# Patient Record
Sex: MALE | Race: WHITE | NOT HISPANIC OR LATINO | Employment: OTHER | URBAN - METROPOLITAN AREA
[De-identification: names, ages, dates, MRNs, and addresses within clinical notes are randomized per-mention and may not be internally consistent; named-entity substitution may affect disease eponyms.]

---

## 2017-04-05 RX ORDER — ETODOLAC 500 MG/1
500 TABLET, FILM COATED ORAL 2 TIMES DAILY
Qty: 60 TABLET | Refills: 2 | Status: SHIPPED | OUTPATIENT
Start: 2017-04-05 | End: 2019-08-07 | Stop reason: SDUPTHER

## 2017-04-13 ENCOUNTER — LAB VISIT (OUTPATIENT)
Dept: LAB | Facility: HOSPITAL | Age: 62
End: 2017-04-13
Attending: INTERNAL MEDICINE
Payer: MEDICARE

## 2017-04-13 DIAGNOSIS — M05.79 SEROPOSITIVE RHEUMATOID ARTHRITIS OF MULTIPLE SITES: ICD-10-CM

## 2017-04-13 LAB
ALBUMIN SERPL BCP-MCNC: 3.7 G/DL
ALP SERPL-CCNC: 89 U/L
ALT SERPL W/O P-5'-P-CCNC: 32 U/L
ANION GAP SERPL CALC-SCNC: 10 MMOL/L
AST SERPL-CCNC: 26 U/L
BASOPHILS # BLD AUTO: 0.02 K/UL
BASOPHILS NFR BLD: 0.3 %
BILIRUB SERPL-MCNC: 0.3 MG/DL
BUN SERPL-MCNC: 19 MG/DL
CALCIUM SERPL-MCNC: 9.2 MG/DL
CHLORIDE SERPL-SCNC: 106 MMOL/L
CO2 SERPL-SCNC: 24 MMOL/L
CREAT SERPL-MCNC: 1 MG/DL
CRP SERPL-MCNC: 3 MG/L
DIFFERENTIAL METHOD: NORMAL
EOSINOPHIL # BLD AUTO: 0.2 K/UL
EOSINOPHIL NFR BLD: 3.2 %
ERYTHROCYTE [DISTWIDTH] IN BLOOD BY AUTOMATED COUNT: 13 %
ERYTHROCYTE [SEDIMENTATION RATE] IN BLOOD BY WESTERGREN METHOD: 27 MM/HR
EST. GFR  (AFRICAN AMERICAN): >60 ML/MIN/1.73 M^2
EST. GFR  (NON AFRICAN AMERICAN): >60 ML/MIN/1.73 M^2
GLUCOSE SERPL-MCNC: 106 MG/DL
HCT VFR BLD AUTO: 42.9 %
HGB BLD-MCNC: 14.3 G/DL
LYMPHOCYTES # BLD AUTO: 1.3 K/UL
LYMPHOCYTES NFR BLD: 19.4 %
MCH RBC QN AUTO: 29.9 PG
MCHC RBC AUTO-ENTMCNC: 33.3 %
MCV RBC AUTO: 90 FL
MONOCYTES # BLD AUTO: 0.9 K/UL
MONOCYTES NFR BLD: 12.7 %
NEUTROPHILS # BLD AUTO: 4.4 K/UL
NEUTROPHILS NFR BLD: 64.1 %
PLATELET # BLD AUTO: 202 K/UL
PMV BLD AUTO: 11.1 FL
POTASSIUM SERPL-SCNC: 4.6 MMOL/L
PROT SERPL-MCNC: 7.4 G/DL
RBC # BLD AUTO: 4.79 M/UL
SODIUM SERPL-SCNC: 140 MMOL/L
WBC # BLD AUTO: 6.91 K/UL

## 2017-04-13 PROCEDURE — 36415 COLL VENOUS BLD VENIPUNCTURE: CPT | Mod: PO

## 2017-04-13 PROCEDURE — 86140 C-REACTIVE PROTEIN: CPT

## 2017-04-13 PROCEDURE — 85025 COMPLETE CBC W/AUTO DIFF WBC: CPT

## 2017-04-13 PROCEDURE — 85651 RBC SED RATE NONAUTOMATED: CPT | Mod: PO

## 2017-04-13 PROCEDURE — 80053 COMPREHEN METABOLIC PANEL: CPT

## 2017-04-20 ENCOUNTER — OFFICE VISIT (OUTPATIENT)
Dept: RHEUMATOLOGY | Facility: CLINIC | Age: 62
End: 2017-04-20
Payer: MEDICARE

## 2017-04-20 VITALS
BODY MASS INDEX: 33.79 KG/M2 | WEIGHT: 228.13 LBS | DIASTOLIC BLOOD PRESSURE: 72 MMHG | HEART RATE: 58 BPM | HEIGHT: 69 IN | SYSTOLIC BLOOD PRESSURE: 118 MMHG

## 2017-04-20 DIAGNOSIS — M05.79 SEROPOSITIVE RHEUMATOID ARTHRITIS OF MULTIPLE SITES: Primary | ICD-10-CM

## 2017-04-20 DIAGNOSIS — Z79.899 LONG-TERM USE OF HIGH-RISK MEDICATION: ICD-10-CM

## 2017-04-20 PROCEDURE — 1160F RVW MEDS BY RX/DR IN RCRD: CPT | Mod: S$GLB,,, | Performed by: INTERNAL MEDICINE

## 2017-04-20 PROCEDURE — 99213 OFFICE O/P EST LOW 20 MIN: CPT | Mod: S$GLB,,, | Performed by: INTERNAL MEDICINE

## 2017-04-20 PROCEDURE — 99999 PR PBB SHADOW E&M-EST. PATIENT-LVL III: CPT | Mod: PBBFAC,,, | Performed by: INTERNAL MEDICINE

## 2017-04-20 ASSESSMENT — ROUTINE ASSESSMENT OF PATIENT INDEX DATA (RAPID3)
MDHAQ FUNCTION SCORE: .6
PATIENT GLOBAL ASSESSMENT SCORE: 2
PSYCHOLOGICAL DISTRESS SCORE: 0
TOTAL RAPID3 SCORE: 1.83
PAIN SCORE: 1.5
AM STIFFNESS SCORE: 1, YES
FATIGUE SCORE: 2
WHEN YOU AWAKENED IN THE MORNING OVER THE LAST WEEK, PLEASE INDICATE THE AMOUNT OF TIME IT TAKES UNTIL YOU ARE AS LIMBER AS YOU WILL BE FOR THE DAY: 30 MINUTES

## 2017-04-20 NOTE — MR AVS SNAPSHOT
[facility] - Rheumatology  [address]  [facility] [address]  Phone: [phone]  Fax: [phone]                  Jaswinder Cifuentes   2017 10:00 AM   Office Visit    Description:  Male : 1955   Provider:  Andi Parra MD   Department:  [facility]           Reason for Visit     Follow-up           Diagnoses this Visit        Comments    Seropositive rheumatoid arthritis of multiple sites    -  Primary     Long-term use of high-risk medication                To Do List           Future Appointments        Provider Department Dept Phone    2017 10:15 AM [facility] [phone]      Goals (5 Years of Data)     None      Follow-Up and Disposition     Return in about 4 months (around 2017).      [facility]     [facility] On Call Nurse Care Line -  Assistance  Unless otherwise directed by your provider, please contact [facility], our nurse care line that is available for  assistance.     Registered nurses in the [facility] provide: appointment scheduling, clinical advisement, health education, and other advisory services.  Call: [phone] (toll free)               Medications           Message regarding Medications     Verify the changes and/or additions to your medication regime listed below are the same as discussed with your clinician today.  If any of these changes or additions are incorrect, please notify your healthcare provider.             Verify that the below list of medications is an accurate representation of the medications you are currently taking.  If none reported, the list may be blank. If incorrect, please contact your healthcare provider. Carry this list with you in case of emergency.           Current Medications     atorvastatin (LIPITOR) 10 MG tablet Take 10 mg by mouth once daily.    etodolac (LODINE) 500 MG tablet Take 1 tablet (500 mg total) by mouth 2 (two) times daily.     "leflunomide (ARAVA) 20 MG Tab 1 EVERY DAY    nebivolol (BYSTOLIC) 5 MG Tab Take 10 mg by mouth once daily.           Clinical Reference Information           Your Vitals Were     BP Pulse Height Weight BMI    118/72 (BP Location: Right arm, Patient Position: Sitting, BP Method: Automatic) 58 5' 9" (1.753 m) 103.5 kg (228 lb 1.6 oz) 33.68 kg/m2      Blood Pressure          Most Recent Value    BP  118/72      Allergies as of 4/20/2017     No Known Allergies      Immunizations Administered on Date of Encounter - 4/20/2017     None      Language Assistance Services     ATTENTION: Language assistance services are available, free of charge. Please call [phone].      ATENCIÓN: Si griffinla scott, tiene a miles disposición servicios gratuitos de asistencia lingüística. Llame al [phone].     RAMONA Ý: N?u b?n nói Ti?ng Vi?t, có các d?ch v? h? tr? ngôn ng? mi?n phí dành cho b?n. G?i s? [phone].         [facility] complies with applicable Federal civil rights laws and does not discriminate on the basis of race, color, national origin, age, disability, or sex.        "

## 2017-04-24 NOTE — PROGRESS NOTES
History of present illness: 61-year-old gentleman with rheumatoid arthritis.  He remains on leflunomide.  He did have a flare of pain in his right knee.  It lasted 4 days and resolve.  He took Lodine which helped.  He has not been taking it regularly.  He had some swelling with erythema and increased warmth.  He has had no problem with the left knee.  His other joints have been stable.  He has had no fever or infections    Physical examination:  Musculoskeletal: Full range of motion of all joints.  No synovitis.    Assessment: Stable rheumatoid arthritis    Plans: Laboratory studies are obtained.  Return to see me in 4 months.

## 2017-06-28 RX ORDER — LEFLUNOMIDE 20 MG/1
TABLET ORAL
Qty: 30 TABLET | Refills: 0 | Status: SHIPPED | OUTPATIENT
Start: 2017-06-28 | End: 2017-07-31 | Stop reason: SDUPTHER

## 2017-07-29 RX ORDER — LEFLUNOMIDE 20 MG/1
TABLET ORAL
Qty: 30 TABLET | Refills: 0 | OUTPATIENT
Start: 2017-07-29

## 2017-08-01 RX ORDER — LEFLUNOMIDE 20 MG/1
TABLET ORAL
Qty: 30 TABLET | Refills: 0 | Status: SHIPPED | OUTPATIENT
Start: 2017-08-01 | End: 2017-08-23 | Stop reason: SDUPTHER

## 2017-08-18 ENCOUNTER — OFFICE VISIT (OUTPATIENT)
Dept: RHEUMATOLOGY | Facility: CLINIC | Age: 62
End: 2017-08-18
Payer: MEDICARE

## 2017-08-18 VITALS
WEIGHT: 221.38 LBS | HEART RATE: 51 BPM | DIASTOLIC BLOOD PRESSURE: 75 MMHG | SYSTOLIC BLOOD PRESSURE: 119 MMHG | HEIGHT: 69 IN | RESPIRATION RATE: 18 BRPM | BODY MASS INDEX: 32.79 KG/M2

## 2017-08-18 DIAGNOSIS — Z79.899 LONG-TERM USE OF HIGH-RISK MEDICATION: ICD-10-CM

## 2017-08-18 DIAGNOSIS — M05.79 SEROPOSITIVE RHEUMATOID ARTHRITIS OF MULTIPLE SITES: Primary | ICD-10-CM

## 2017-08-18 PROCEDURE — 99213 OFFICE O/P EST LOW 20 MIN: CPT | Mod: S$GLB,,, | Performed by: INTERNAL MEDICINE

## 2017-08-18 PROCEDURE — 3008F BODY MASS INDEX DOCD: CPT | Mod: S$GLB,,, | Performed by: INTERNAL MEDICINE

## 2017-08-18 PROCEDURE — 99999 PR PBB SHADOW E&M-EST. PATIENT-LVL III: CPT | Mod: PBBFAC,,, | Performed by: INTERNAL MEDICINE

## 2017-08-18 RX ORDER — BENAZEPRIL HYDROCHLORIDE 10 MG/1
TABLET ORAL
COMMUNITY
Start: 2017-08-02 | End: 2019-08-19

## 2017-08-18 ASSESSMENT — ROUTINE ASSESSMENT OF PATIENT INDEX DATA (RAPID3)
PSYCHOLOGICAL DISTRESS SCORE: 0
FATIGUE SCORE: 3
MDHAQ FUNCTION SCORE: .6
PAIN SCORE: 2
TOTAL RAPID3 SCORE: 2.33
AM STIFFNESS SCORE: 0, NO
PATIENT GLOBAL ASSESSMENT SCORE: 3

## 2017-08-19 NOTE — PROGRESS NOTES
History of present illness: 62-year-old gentleman I follow for rheumatoid arthritis.  He remains on leflunomide.  His arthritis has been stable.  He takes Lodine infrequently.  He has had some problems with his blood pressure and is on new medications.  He has had no joint swelling.  He has had no fever or infections.    Physical examination:  Musculoskeletal: Good range of motion of all joints.  No active synovitis.    Assessment: Stable rheumatoid arthritis    Plans: Review laboratory studies from today.  Continue medications as before.  Return to see me in 4 months.

## 2017-08-23 RX ORDER — LEFLUNOMIDE 20 MG/1
TABLET ORAL
Qty: 30 TABLET | Refills: 0 | Status: SHIPPED | OUTPATIENT
Start: 2017-08-23 | End: 2017-09-26 | Stop reason: SDUPTHER

## 2017-09-27 RX ORDER — LEFLUNOMIDE 20 MG/1
TABLET ORAL
Qty: 30 TABLET | Refills: 4 | Status: SHIPPED | OUTPATIENT
Start: 2017-09-27 | End: 2018-01-29 | Stop reason: SDUPTHER

## 2017-11-07 ENCOUNTER — LAB VISIT (OUTPATIENT)
Dept: LAB | Facility: HOSPITAL | Age: 62
End: 2017-11-07
Attending: INTERNAL MEDICINE
Payer: MEDICARE

## 2017-11-07 DIAGNOSIS — M05.79 SEROPOSITIVE RHEUMATOID ARTHRITIS OF MULTIPLE SITES: ICD-10-CM

## 2017-11-07 LAB
ALBUMIN SERPL BCP-MCNC: 3.4 G/DL
ALP SERPL-CCNC: 89 U/L
ALT SERPL W/O P-5'-P-CCNC: 26 U/L
ANION GAP SERPL CALC-SCNC: 6 MMOL/L
AST SERPL-CCNC: 26 U/L
BASOPHILS # BLD AUTO: 0.05 K/UL
BASOPHILS NFR BLD: 0.7 %
BILIRUB SERPL-MCNC: 0.4 MG/DL
BUN SERPL-MCNC: 12 MG/DL
CALCIUM SERPL-MCNC: 9 MG/DL
CHLORIDE SERPL-SCNC: 108 MMOL/L
CO2 SERPL-SCNC: 24 MMOL/L
CREAT SERPL-MCNC: 0.9 MG/DL
CRP SERPL-MCNC: 2.1 MG/L
DIFFERENTIAL METHOD: ABNORMAL
EOSINOPHIL # BLD AUTO: 0.2 K/UL
EOSINOPHIL NFR BLD: 2.2 %
ERYTHROCYTE [DISTWIDTH] IN BLOOD BY AUTOMATED COUNT: 12.8 %
ERYTHROCYTE [SEDIMENTATION RATE] IN BLOOD BY WESTERGREN METHOD: 21 MM/HR
EST. GFR  (AFRICAN AMERICAN): >60 ML/MIN/1.73 M^2
EST. GFR  (NON AFRICAN AMERICAN): >60 ML/MIN/1.73 M^2
GLUCOSE SERPL-MCNC: 85 MG/DL
HCT VFR BLD AUTO: 40.3 %
HGB BLD-MCNC: 13.7 G/DL
IMM GRANULOCYTES # BLD AUTO: 0.02 K/UL
IMM GRANULOCYTES NFR BLD AUTO: 0.3 %
LYMPHOCYTES # BLD AUTO: 1.7 K/UL
LYMPHOCYTES NFR BLD: 25.4 %
MCH RBC QN AUTO: 30.5 PG
MCHC RBC AUTO-ENTMCNC: 34 G/DL
MCV RBC AUTO: 90 FL
MONOCYTES # BLD AUTO: 0.9 K/UL
MONOCYTES NFR BLD: 12.7 %
NEUTROPHILS # BLD AUTO: 3.9 K/UL
NEUTROPHILS NFR BLD: 58.7 %
NRBC BLD-RTO: 0 /100 WBC
PLATELET # BLD AUTO: 202 K/UL
PMV BLD AUTO: 12 FL
POTASSIUM SERPL-SCNC: 4.7 MMOL/L
PROT SERPL-MCNC: 6.9 G/DL
RBC # BLD AUTO: 4.49 M/UL
SODIUM SERPL-SCNC: 138 MMOL/L
WBC # BLD AUTO: 6.7 K/UL

## 2017-11-07 PROCEDURE — 36415 COLL VENOUS BLD VENIPUNCTURE: CPT | Mod: PO

## 2017-11-07 PROCEDURE — 80053 COMPREHEN METABOLIC PANEL: CPT

## 2017-11-07 PROCEDURE — 86140 C-REACTIVE PROTEIN: CPT

## 2017-11-07 PROCEDURE — 85025 COMPLETE CBC W/AUTO DIFF WBC: CPT

## 2017-11-07 PROCEDURE — 85651 RBC SED RATE NONAUTOMATED: CPT | Mod: PO

## 2018-01-30 RX ORDER — LEFLUNOMIDE 20 MG/1
TABLET ORAL
Qty: 30 TABLET | Refills: 0 | Status: SHIPPED | OUTPATIENT
Start: 2018-01-30 | End: 2018-03-28 | Stop reason: SDUPTHER

## 2018-03-02 ENCOUNTER — LAB VISIT (OUTPATIENT)
Dept: LAB | Facility: HOSPITAL | Age: 63
End: 2018-03-02
Attending: INTERNAL MEDICINE
Payer: MEDICARE

## 2018-03-02 DIAGNOSIS — M05.79 SEROPOSITIVE RHEUMATOID ARTHRITIS OF MULTIPLE SITES: ICD-10-CM

## 2018-03-02 LAB
ALBUMIN SERPL BCP-MCNC: 3.3 G/DL
ALP SERPL-CCNC: 77 U/L
ALT SERPL W/O P-5'-P-CCNC: 33 U/L
ANION GAP SERPL CALC-SCNC: 7 MMOL/L
AST SERPL-CCNC: 27 U/L
BASOPHILS # BLD AUTO: 0.04 K/UL
BASOPHILS NFR BLD: 0.6 %
BILIRUB SERPL-MCNC: 0.3 MG/DL
BUN SERPL-MCNC: 20 MG/DL
CALCIUM SERPL-MCNC: 9 MG/DL
CHLORIDE SERPL-SCNC: 109 MMOL/L
CO2 SERPL-SCNC: 27 MMOL/L
CREAT SERPL-MCNC: 0.9 MG/DL
CRP SERPL-MCNC: 2.1 MG/L
DIFFERENTIAL METHOD: ABNORMAL
EOSINOPHIL # BLD AUTO: 0.2 K/UL
EOSINOPHIL NFR BLD: 3.3 %
ERYTHROCYTE [DISTWIDTH] IN BLOOD BY AUTOMATED COUNT: 12.5 %
ERYTHROCYTE [SEDIMENTATION RATE] IN BLOOD BY WESTERGREN METHOD: 21 MM/HR
EST. GFR  (AFRICAN AMERICAN): >60 ML/MIN/1.73 M^2
EST. GFR  (NON AFRICAN AMERICAN): >60 ML/MIN/1.73 M^2
GLUCOSE SERPL-MCNC: 69 MG/DL
HCT VFR BLD AUTO: 37.4 %
HGB BLD-MCNC: 12.6 G/DL
IMM GRANULOCYTES # BLD AUTO: 0.02 K/UL
IMM GRANULOCYTES NFR BLD AUTO: 0.3 %
LYMPHOCYTES # BLD AUTO: 1.9 K/UL
LYMPHOCYTES NFR BLD: 25.7 %
MCH RBC QN AUTO: 29.9 PG
MCHC RBC AUTO-ENTMCNC: 33.7 G/DL
MCV RBC AUTO: 89 FL
MONOCYTES # BLD AUTO: 1.2 K/UL
MONOCYTES NFR BLD: 17 %
NEUTROPHILS # BLD AUTO: 3.9 K/UL
NEUTROPHILS NFR BLD: 53.1 %
NRBC BLD-RTO: 0 /100 WBC
PLATELET # BLD AUTO: 185 K/UL
PMV BLD AUTO: 11.8 FL
POTASSIUM SERPL-SCNC: 4.5 MMOL/L
PROT SERPL-MCNC: 6.7 G/DL
RBC # BLD AUTO: 4.22 M/UL
SODIUM SERPL-SCNC: 143 MMOL/L
WBC # BLD AUTO: 7.25 K/UL

## 2018-03-02 PROCEDURE — 36415 COLL VENOUS BLD VENIPUNCTURE: CPT | Mod: PO

## 2018-03-02 PROCEDURE — 86140 C-REACTIVE PROTEIN: CPT

## 2018-03-02 PROCEDURE — 85025 COMPLETE CBC W/AUTO DIFF WBC: CPT

## 2018-03-02 PROCEDURE — 85651 RBC SED RATE NONAUTOMATED: CPT | Mod: PO

## 2018-03-02 PROCEDURE — 80053 COMPREHEN METABOLIC PANEL: CPT

## 2018-03-06 ENCOUNTER — OFFICE VISIT (OUTPATIENT)
Dept: RHEUMATOLOGY | Facility: CLINIC | Age: 63
End: 2018-03-06
Payer: MEDICARE

## 2018-03-06 VITALS
BODY MASS INDEX: 34.11 KG/M2 | DIASTOLIC BLOOD PRESSURE: 82 MMHG | WEIGHT: 231 LBS | HEART RATE: 51 BPM | SYSTOLIC BLOOD PRESSURE: 145 MMHG

## 2018-03-06 DIAGNOSIS — M05.79 SEROPOSITIVE RHEUMATOID ARTHRITIS OF MULTIPLE SITES: Primary | ICD-10-CM

## 2018-03-06 DIAGNOSIS — Z79.899 LONG-TERM USE OF HIGH-RISK MEDICATION: ICD-10-CM

## 2018-03-06 PROCEDURE — 99213 OFFICE O/P EST LOW 20 MIN: CPT | Mod: S$GLB,,, | Performed by: INTERNAL MEDICINE

## 2018-03-06 PROCEDURE — 99999 PR PBB SHADOW E&M-EST. PATIENT-LVL III: CPT | Mod: PBBFAC,,, | Performed by: INTERNAL MEDICINE

## 2018-03-06 NOTE — PROGRESS NOTES
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  He was treated with Plaquenil.  In 2014 his arthritis became more persistent.  I added leflunomide at that time.  He has done well since then.  He stopped the Plaquenil when the price went up and just continued on leflunomide alone.  He also takes Lodine as needed.    He complains of occasional aching.  It occurs every other week.  Is been especially in the wrist but also the shoulders and knees.  The pain lasts less than 24 hours.  He has no joint swelling.  He takes Lodine and the pain goes away.  He has been tolerating his medications.  He is on increased dose of his blood pressure medicine but is had no other recent medical problems.  He did have a recent sinus infection.    Physical examination:  Musculoskeletal: Full range of motion of all joints.  No synovitis.  No tender areas to palpation.  Laboratory: Normal inflammatory markers    Assessment: Stable rheumatoid arthritis    Plans:  1.  Repeat laboratory studies every 4 months  2.  Continue medications as before  3.  Return to see me in 8 months.

## 2018-03-28 RX ORDER — LEFLUNOMIDE 20 MG/1
TABLET ORAL
Qty: 30 TABLET | Refills: 1 | Status: SHIPPED | OUTPATIENT
Start: 2018-03-28 | End: 2018-05-25 | Stop reason: SDUPTHER

## 2018-05-25 RX ORDER — LEFLUNOMIDE 20 MG/1
TABLET ORAL
Qty: 30 TABLET | Refills: 3 | Status: SHIPPED | OUTPATIENT
Start: 2018-05-25 | End: 2018-09-27 | Stop reason: SDUPTHER

## 2018-07-03 ENCOUNTER — LAB VISIT (OUTPATIENT)
Dept: LAB | Facility: HOSPITAL | Age: 63
End: 2018-07-03
Attending: INTERNAL MEDICINE
Payer: MEDICARE

## 2018-07-03 DIAGNOSIS — Z79.899 LONG-TERM USE OF HIGH-RISK MEDICATION: ICD-10-CM

## 2018-07-03 DIAGNOSIS — M05.79 SEROPOSITIVE RHEUMATOID ARTHRITIS OF MULTIPLE SITES: ICD-10-CM

## 2018-07-03 LAB
ALBUMIN SERPL BCP-MCNC: 3.5 G/DL
ALP SERPL-CCNC: 85 U/L
ALT SERPL W/O P-5'-P-CCNC: 21 U/L
ANION GAP SERPL CALC-SCNC: 7 MMOL/L
AST SERPL-CCNC: 21 U/L
BASOPHILS # BLD AUTO: 0.04 K/UL
BASOPHILS NFR BLD: 0.6 %
BILIRUB SERPL-MCNC: 0.4 MG/DL
BUN SERPL-MCNC: 16 MG/DL
CALCIUM SERPL-MCNC: 9.5 MG/DL
CHLORIDE SERPL-SCNC: 105 MMOL/L
CO2 SERPL-SCNC: 28 MMOL/L
CREAT SERPL-MCNC: 1 MG/DL
CRP SERPL-MCNC: 3.7 MG/L
DIFFERENTIAL METHOD: ABNORMAL
EOSINOPHIL # BLD AUTO: 0.2 K/UL
EOSINOPHIL NFR BLD: 2.8 %
ERYTHROCYTE [DISTWIDTH] IN BLOOD BY AUTOMATED COUNT: 13 %
ERYTHROCYTE [SEDIMENTATION RATE] IN BLOOD BY WESTERGREN METHOD: 27 MM/HR
EST. GFR  (AFRICAN AMERICAN): >60 ML/MIN/1.73 M^2
EST. GFR  (NON AFRICAN AMERICAN): >60 ML/MIN/1.73 M^2
GLUCOSE SERPL-MCNC: 137 MG/DL
HCT VFR BLD AUTO: 41.5 %
HGB BLD-MCNC: 13.4 G/DL
IMM GRANULOCYTES # BLD AUTO: 0.02 K/UL
IMM GRANULOCYTES NFR BLD AUTO: 0.3 %
LYMPHOCYTES # BLD AUTO: 1.5 K/UL
LYMPHOCYTES NFR BLD: 20.9 %
MCH RBC QN AUTO: 29.3 PG
MCHC RBC AUTO-ENTMCNC: 32.3 G/DL
MCV RBC AUTO: 91 FL
MONOCYTES # BLD AUTO: 0.9 K/UL
MONOCYTES NFR BLD: 12 %
NEUTROPHILS # BLD AUTO: 4.5 K/UL
NEUTROPHILS NFR BLD: 63.4 %
NRBC BLD-RTO: 0 /100 WBC
PLATELET # BLD AUTO: 173 K/UL
PMV BLD AUTO: 11.7 FL
POTASSIUM SERPL-SCNC: 4.5 MMOL/L
PROT SERPL-MCNC: 6.8 G/DL
RBC # BLD AUTO: 4.57 M/UL
SODIUM SERPL-SCNC: 140 MMOL/L
WBC # BLD AUTO: 7.08 K/UL

## 2018-07-03 PROCEDURE — 80053 COMPREHEN METABOLIC PANEL: CPT

## 2018-07-03 PROCEDURE — 36415 COLL VENOUS BLD VENIPUNCTURE: CPT | Mod: PO

## 2018-07-03 PROCEDURE — 85651 RBC SED RATE NONAUTOMATED: CPT | Mod: PO

## 2018-07-03 PROCEDURE — 86140 C-REACTIVE PROTEIN: CPT

## 2018-07-03 PROCEDURE — 85025 COMPLETE CBC W/AUTO DIFF WBC: CPT

## 2018-09-27 RX ORDER — LEFLUNOMIDE 20 MG/1
TABLET ORAL
Qty: 30 TABLET | Refills: 0 | Status: SHIPPED | OUTPATIENT
Start: 2018-09-27 | End: 2018-10-30 | Stop reason: SDUPTHER

## 2018-10-24 RX ORDER — LEFLUNOMIDE 20 MG/1
TABLET ORAL
Qty: 30 TABLET | Refills: 0 | OUTPATIENT
Start: 2018-10-24

## 2018-10-25 ENCOUNTER — PATIENT MESSAGE (OUTPATIENT)
Dept: RHEUMATOLOGY | Facility: CLINIC | Age: 63
End: 2018-10-25

## 2018-10-25 NOTE — TELEPHONE ENCOUNTER
Medication refused due to failing protocol.    Requested Prescriptions   Pending Prescriptions Disp Refills    methotrexate 2.5 MG Tab [Pharmacy Med Name: METHOTREXATE TAB 2.5MG] 96 tablet 0     Sig: TAKE 8 TABLETS EVERY 7 DAYS    [facility] Refill Protocol Failed    5/15/2018  2:02 AM       Failed - ALT within 3 months    Lab Results   Component Value Date    ALT 25 10/10/2017             Failed - AST within 3 months    Lab Results   Component Value Date    AST 20 10/10/2017             Failed - Creatinine within 3 months    Lab Results   Component Value Date    CREATININE 1.1 10/10/2017             Failed - Platelet count within 3 months    Lab Results   Component Value Date     10/10/2017             Failed - Lymphocyte count within 3 months    Lab Results   Component Value Date    LYMPH 1.1 10/10/2017    LYMPH 19.6 10/10/2017             Failed - Neutrophil count within 3 months

## 2018-10-29 ENCOUNTER — LAB VISIT (OUTPATIENT)
Dept: LAB | Facility: HOSPITAL | Age: 63
End: 2018-10-29
Attending: INTERNAL MEDICINE
Payer: MEDICARE

## 2018-10-29 DIAGNOSIS — M05.79 SEROPOSITIVE RHEUMATOID ARTHRITIS OF MULTIPLE SITES: ICD-10-CM

## 2018-10-29 DIAGNOSIS — Z79.899 LONG-TERM USE OF HIGH-RISK MEDICATION: ICD-10-CM

## 2018-10-29 LAB
ALBUMIN SERPL BCP-MCNC: 3.5 G/DL
ALP SERPL-CCNC: 101 U/L
ALT SERPL W/O P-5'-P-CCNC: 25 U/L
ANION GAP SERPL CALC-SCNC: 5 MMOL/L
AST SERPL-CCNC: 25 U/L
BASOPHILS # BLD AUTO: 0.06 K/UL
BASOPHILS NFR BLD: 1 %
BILIRUB SERPL-MCNC: 0.4 MG/DL
BUN SERPL-MCNC: 15 MG/DL
CALCIUM SERPL-MCNC: 9.4 MG/DL
CHLORIDE SERPL-SCNC: 106 MMOL/L
CO2 SERPL-SCNC: 27 MMOL/L
CREAT SERPL-MCNC: 0.9 MG/DL
CRP SERPL-MCNC: 4.6 MG/L
DIFFERENTIAL METHOD: ABNORMAL
EOSINOPHIL # BLD AUTO: 0.3 K/UL
EOSINOPHIL NFR BLD: 4.5 %
ERYTHROCYTE [DISTWIDTH] IN BLOOD BY AUTOMATED COUNT: 12.9 %
ERYTHROCYTE [SEDIMENTATION RATE] IN BLOOD BY WESTERGREN METHOD: 45 MM/HR
EST. GFR  (AFRICAN AMERICAN): >60 ML/MIN/1.73 M^2
EST. GFR  (NON AFRICAN AMERICAN): >60 ML/MIN/1.73 M^2
GLUCOSE SERPL-MCNC: 90 MG/DL
HCT VFR BLD AUTO: 42.7 %
HGB BLD-MCNC: 13.4 G/DL
IMM GRANULOCYTES # BLD AUTO: 0.02 K/UL
IMM GRANULOCYTES NFR BLD AUTO: 0.3 %
LYMPHOCYTES # BLD AUTO: 1.8 K/UL
LYMPHOCYTES NFR BLD: 28 %
MCH RBC QN AUTO: 28.7 PG
MCHC RBC AUTO-ENTMCNC: 31.4 G/DL
MCV RBC AUTO: 91 FL
MONOCYTES # BLD AUTO: 1 K/UL
MONOCYTES NFR BLD: 15.2 %
NEUTROPHILS # BLD AUTO: 3.2 K/UL
NEUTROPHILS NFR BLD: 51 %
NRBC BLD-RTO: 0 /100 WBC
PLATELET # BLD AUTO: 213 K/UL
PMV BLD AUTO: 10.7 FL
POTASSIUM SERPL-SCNC: 4.7 MMOL/L
PROT SERPL-MCNC: 7.1 G/DL
RBC # BLD AUTO: 4.67 M/UL
SODIUM SERPL-SCNC: 138 MMOL/L
WBC # BLD AUTO: 6.24 K/UL

## 2018-10-29 PROCEDURE — 85651 RBC SED RATE NONAUTOMATED: CPT | Mod: PO

## 2018-10-29 PROCEDURE — 36415 COLL VENOUS BLD VENIPUNCTURE: CPT | Mod: PO

## 2018-10-29 PROCEDURE — 85025 COMPLETE CBC W/AUTO DIFF WBC: CPT

## 2018-10-29 PROCEDURE — 80053 COMPREHEN METABOLIC PANEL: CPT

## 2018-10-29 PROCEDURE — 86140 C-REACTIVE PROTEIN: CPT

## 2018-10-30 ENCOUNTER — PATIENT MESSAGE (OUTPATIENT)
Dept: RHEUMATOLOGY | Facility: CLINIC | Age: 63
End: 2018-10-30

## 2018-10-30 DIAGNOSIS — M05.79 SEROPOSITIVE RHEUMATOID ARTHRITIS OF MULTIPLE SITES: Primary | ICD-10-CM

## 2018-10-30 RX ORDER — LEFLUNOMIDE 20 MG/1
20 TABLET ORAL DAILY
Qty: 30 TABLET | Refills: 2 | Status: SHIPPED | OUTPATIENT
Start: 2018-10-30 | End: 2018-11-26 | Stop reason: SDUPTHER

## 2018-11-26 DIAGNOSIS — M05.79 SEROPOSITIVE RHEUMATOID ARTHRITIS OF MULTIPLE SITES: ICD-10-CM

## 2018-11-26 RX ORDER — LEFLUNOMIDE 20 MG/1
20 TABLET ORAL DAILY
Qty: 30 TABLET | Refills: 2 | Status: SHIPPED | OUTPATIENT
Start: 2018-11-26 | End: 2019-03-08 | Stop reason: SDUPTHER

## 2019-01-16 ENCOUNTER — TELEPHONE (OUTPATIENT)
Dept: UROLOGY | Facility: CLINIC | Age: 64
End: 2019-01-16

## 2019-01-16 NOTE — TELEPHONE ENCOUNTER
Spoke w pt he was referred by Dr Esparza and stated he has never seen any other Urologist and hasnt had any outside lab besides besides jose

## 2019-02-11 NOTE — PROGRESS NOTES
[facility] Urology New Patient/H&P:    Jaswinder Cifuentes is a 63 y.o. male referred by Dr Esparza for evaluation of BPH/LUTS    PMHx of HTN, HLD, AP, DM, AS, RA  He was last seen by Dr Esparza on 1/15/19 noting AP is likely the cause of his daytime somnolence and was referred to Dr Ritchie for sleep study, and was started on flomax for his LUTS and referred for eval.   PSA 3.3 on 1/16/19, 1.9 on 1/9/18, 3.1 on 12/30/16, 2.2 in 2014, 1.2 in 2012    Was getting up 2-4x at night and had bothersome DTF Q2h or sooner. Does drink a lot of coffee and beer.  Coffee 3 cups in AM and beer 2 in evening  No history of hematuria or dysuria.  Has seen Dr Ritchie and will be arranging sleep study soon.  Pre flomax AUA SS: 31/4, mostly dissatisfied (5:  Frequency, intermittency, weak stream; 4:  Emptying, urgency, straining, sleeping)  Symptoms problematic since last summer - august    Since starting flomax has had less urgency/frequency and less nocturia.  No longer feels like he will urinate on himself before he gets to the bathroom  Since starting Flomax his AUA symptom score is now 7/2 (2:  Weak stream, nocturia; 1:  Emptying, frequency, intermittency) and he notes that medication helped 9/10  Daytime frequency is now about 5 times, voiding approximately every 4-5 hours rather than every 2 hr or less  Now 1 cup coffee in AM and 3x/week few beers.  No real soda, tea, spicy foods  No constipation.  No family history of prostate cancer.  Taking flomax in evening  Not much of a water drinker  No dizziness no retrograde ejaculation.    pvr 0cc          Past Medical History:   Diagnosis Date    Hypertension        No past surgical history on file.    No family history on file.    Social History     Socioeconomic History    Marital status:      Spouse name: Not on file    Number of children: Not on file    Years of education: Not on file    Highest education level: Not on file   Social Needs    Financial resource strain:  "Not on file    Food insecurity - worry: Not on file    Food insecurity - inability: Not on file    Transportation needs - medical: Not on file    Transportation needs - non-medical: Not on file   Occupational History    Not on file   Tobacco Use    Smoking status: Never Smoker   Substance and Sexual Activity    Alcohol use: Not on file    Drug use: Not on file    Sexual activity: Not on file   Other Topics Concern    Not on file   Social History Narrative    Not on file       Review of patient's allergies indicates:  No Known Allergies    Medications Reviewed: see MAR    ROS:    Constitutional: denies fevers, chills, night sweats, fatigue, malaise  Respiratory: negative for cough, shortness of breath, wheezing, dyspnea.  Cardiovascular: + for high blood pressure, negative for chest pain, varicose veins, ankle swelling, palpitations, syncope.  GI: negative for abdominal pain, heartburn, indigestion, nausea, vomiting, constipation, diarrhea, blood in stool.   Urology: as noted above in HPI  Endocrinology: negative for cold intolerance, excessive thirst, not feeling tired/sluggish, no heat intolerance.   Hematology/Lymph: negative for easy bleeding, easy bruising, swollen glands.  Musculoskeletal: + arthritis, negative for back pain, and neck pain.  Allergy-Immunology: negative for seasonal allergies, negative for unusual infections.   Skin: negative for boils, breast lumps, hives, itching, rash.   Neurology: negative for, dizziness, headache, tingling/numbness, tremors.   Psych: satisfied with life; negative for, anxiety, depression, suicidal thoughts.     PHYSICAL EXAM:    Vitals:    02/12/19 0836   BP: 115/72   Pulse: (!) 58   Resp: 18     Body mass index is 33.53 kg/m². Weight: 103 kg (227 lb 1.2 oz) Height: 5' 9" (175.3 cm)       General: Alert, cooperative, no distress, appears stated age  Head: Normocephalic, without obvious abnormality, atraumatic  Neck: no masses, no thyromegaly, no " lymphadenopathy  Eyes: PERRL, conjunctiva/corneas clear  Lungs: Respirations unlabored, normal effort, no accessory muscle use  CV: Warm and well perfused extremities  Abdomen: Soft, non-tender, nondistended, 1+ pannus  Penis: phallus normal, well cared for, no plaques or lesions.   Scrotum: no cysts, no lesions, no rash, no hydrocele.   Epididymes: normal, nontender, symmetrical, no masses or cysts.   Testes: normal, both descended, no masses.   Urethra: palpably normal with orthotopic meatus of normal size    CY: normal sphincter tone, no masses, no hemmorrhoids   PROSTATE:30-35g, no nodules, non-tender, symmetrical.   Extremities: Extremities normal, atraumatic, no cyanosis or edema  Skin: Normal color, texture, and turgor, no rashes or lesions  Psych: Appropriate, well oriented, normal affect, normal mood  Neuro: Non-focal    LABS:    Recent Results (from the past 336 hour(s))   POCT URINE DIPSTICK WITHOUT MICROSCOPE    Collection Time: 02/12/19  8:40 AM   Result Value Ref Range    Color, UA yellow     Spec Grav UA 1.015     pH, UA 7     WBC, UA trace     Nitrite, UA neg     Protein neg     Glucose, UA neg     Ketones, UA neg     Urobilinogen, UA neg     Bilirubin neg     Blood, UA neg          Assessment/Diagnosis:    1. BPH with obstruction/lower urinary tract symptoms  POCT URINE DIPSTICK WITHOUT MICROSCOPE    POCT Bladder Scan       Plans:  He did have very severe obstructive lower urinary tract symptoms with concurrent bothersome urgency and frequency.  Since starting Flomax he has had a dramatic reduction in his lower urinary tract symptoms, now bordering on mild-to-moderate but much less bothersome to him.  He has been very pleased with his symptomatic control on Flomax.  He did indicate some interest in minimally invasive BPH interventions and we did discuss them briefly.  At this time he is happy on medical therapy, and we did discuss closely following his lower urinary tract symptoms.  We did discuss  long-term implications on bladder function and overall urinary tract health in the face of chronic obstruction.  In the future should he have progression of symptoms on his Flomax, or renew interest in minimally invasive interventions, would suggest formal lower urinary tract evaluation 1st with cystoscopy and transrectal ultrasound to determine level of obstruction accurate volumetric measurement of prostate size to help guide further recommendations.    Will have him return in 6 months to see our nurse practitioner for re-evaluation of LUTS and emptying with a repeat AUA symptom score and PVR.  If he is stable at that time and happy on medical therapy, can return to see me 1 year later with PSA prior.  Otherwise can discuss implant formal lower urinary tract evaluation.    As the majority of his symptoms have resolved, though mild nocturia persists, we did discuss the inter plate of obstructive sleep apnea and nighttime urination.  Conservatively, we did discuss discontinuing fluid intake 2 hr before bed, but I did encourage him to pursue AP evaluation as control of his sleep apnea will also help decrease his nocturia further.  He has had a dramatic reduction in urgency and frequency since starting alpha blockers to relieve obstruction and we did discuss the implications of longstanding obstruction on bladder function potential reading urgency and frequency and is encouraging that his urgency and frequency have improved with relief of obstruction.  Discussed conservative measures to control urgency and frequency including avoiding bladder irritants, timed voiding, not postponing voiding, and bowel regimen (as distended bowel has extrinsic compressive effect on bladder. Discussed bladder irritants include coffe (even decaf), tea, alcohol, soda, spicy foods, acidic juices (orange, tomato), vinegar, and artificial sweeteners.    All questions patient had were answered and he is agreeable to treatment plan.

## 2019-02-12 ENCOUNTER — OFFICE VISIT (OUTPATIENT)
Dept: UROLOGY | Facility: CLINIC | Age: 64
End: 2019-02-12
Payer: MEDICARE

## 2019-02-12 VITALS
DIASTOLIC BLOOD PRESSURE: 72 MMHG | SYSTOLIC BLOOD PRESSURE: 115 MMHG | HEIGHT: 69 IN | BODY MASS INDEX: 33.63 KG/M2 | RESPIRATION RATE: 18 BRPM | WEIGHT: 227.06 LBS | HEART RATE: 58 BPM

## 2019-02-12 DIAGNOSIS — N40.1 BPH WITH OBSTRUCTION/LOWER URINARY TRACT SYMPTOMS: Primary | ICD-10-CM

## 2019-02-12 DIAGNOSIS — N13.8 BPH WITH OBSTRUCTION/LOWER URINARY TRACT SYMPTOMS: Primary | ICD-10-CM

## 2019-02-12 LAB
BILIRUB SERPL-MCNC: ABNORMAL MG/DL
BLOOD URINE, POC: ABNORMAL
COLOR, POC UA: YELLOW
GLUCOSE UR QL STRIP: ABNORMAL
KETONES UR QL STRIP: ABNORMAL
LEUKOCYTE ESTERASE URINE, POC: ABNORMAL
NITRITE, POC UA: ABNORMAL
PH, POC UA: 7
PROTEIN, POC: ABNORMAL
SPECIFIC GRAVITY, POC UA: 1.01
UROBILINOGEN, POC UA: ABNORMAL

## 2019-02-12 PROCEDURE — 3008F BODY MASS INDEX DOCD: CPT | Mod: CPTII,S$GLB,, | Performed by: UROLOGY

## 2019-02-12 PROCEDURE — 99999 PR PBB SHADOW E&M-EST. PATIENT-LVL III: ICD-10-PCS | Mod: PBBFAC,,, | Performed by: UROLOGY

## 2019-02-12 PROCEDURE — 81002 URINALYSIS NONAUTO W/O SCOPE: CPT | Mod: S$GLB,,, | Performed by: UROLOGY

## 2019-02-12 PROCEDURE — 99204 PR OFFICE/OUTPT VISIT, NEW, LEVL IV, 45-59 MIN: ICD-10-PCS | Mod: 25,S$GLB,, | Performed by: UROLOGY

## 2019-02-12 PROCEDURE — 3008F PR BODY MASS INDEX (BMI) DOCUMENTED: ICD-10-PCS | Mod: CPTII,S$GLB,, | Performed by: UROLOGY

## 2019-02-12 PROCEDURE — 81002 POCT URINE DIPSTICK WITHOUT MICROSCOPE: ICD-10-PCS | Mod: S$GLB,,, | Performed by: UROLOGY

## 2019-02-12 PROCEDURE — 99204 OFFICE O/P NEW MOD 45 MIN: CPT | Mod: 25,S$GLB,, | Performed by: UROLOGY

## 2019-02-12 PROCEDURE — 99999 PR PBB SHADOW E&M-EST. PATIENT-LVL III: CPT | Mod: PBBFAC,,, | Performed by: UROLOGY

## 2019-02-12 RX ORDER — BENZONATATE 100 MG/1
100 CAPSULE ORAL 3 TIMES DAILY PRN
COMMUNITY
End: 2021-02-03 | Stop reason: ALTCHOICE

## 2019-02-12 RX ORDER — TAMSULOSIN HYDROCHLORIDE 0.4 MG/1
0.4 CAPSULE ORAL DAILY
COMMUNITY
End: 2021-01-05 | Stop reason: SDUPTHER

## 2019-02-12 RX ORDER — ESCITALOPRAM OXALATE 20 MG/1
20 TABLET ORAL DAILY
COMMUNITY
End: 2021-02-03

## 2019-02-12 RX ORDER — METFORMIN HYDROCHLORIDE 500 MG/1
500 TABLET ORAL 2 TIMES DAILY WITH MEALS
COMMUNITY
End: 2021-02-03 | Stop reason: CLARIF

## 2019-02-12 NOTE — LETTER
February 15, 2019      Abrahan Esparza MD  [address]  [place] [address]           [place] [facility]  [address] [place] [address] [address]  [place] [address]  Phone: [phone]  Fax: [phone]          Patient: Jaswinder Cifuentes   MR Number: [id number]   YOB: 1955   Date of Visit: 2/12/2019       Dear Dr. Abrahan Esparza:    Thank you for referring Jaswinder Cifuentes to me for evaluation. Attached you will find relevant portions of my assessment and plan of care.    If you have questions, please do not hesitate to call me. I look forward to following Jaswinder Cifuentes along with you.    Sincerely,    Anu Santiago  CC:  No Recipients    If you would like to receive this communication electronically, please contact [email or web address] or [phone] to request more information on [employer] access.    For providers and/or their staff who would like to refer a patient to [facility], please contact us through our one-stop-shop provider referral line, [facility] , at [phone].    If you feel you have received this communication in error or would no longer like to receive these types of communications, please e-mail [email or web address]

## 2019-03-01 DIAGNOSIS — M05.79 SEROPOSITIVE RHEUMATOID ARTHRITIS OF MULTIPLE SITES: ICD-10-CM

## 2019-03-01 RX ORDER — LEFLUNOMIDE 20 MG/1
TABLET ORAL
Qty: 30 TABLET | Refills: 0 | OUTPATIENT
Start: 2019-03-01

## 2019-03-06 ENCOUNTER — PATIENT MESSAGE (OUTPATIENT)
Dept: RHEUMATOLOGY | Facility: CLINIC | Age: 64
End: 2019-03-06

## 2019-03-07 ENCOUNTER — LAB VISIT (OUTPATIENT)
Dept: LAB | Facility: HOSPITAL | Age: 64
End: 2019-03-07
Attending: INTERNAL MEDICINE
Payer: MEDICARE

## 2019-03-07 DIAGNOSIS — M05.79 SEROPOSITIVE RHEUMATOID ARTHRITIS OF MULTIPLE SITES: ICD-10-CM

## 2019-03-07 DIAGNOSIS — Z79.899 LONG-TERM USE OF HIGH-RISK MEDICATION: ICD-10-CM

## 2019-03-07 LAB
ALBUMIN SERPL BCP-MCNC: 3.7 G/DL
ALP SERPL-CCNC: 95 U/L
ALT SERPL W/O P-5'-P-CCNC: 29 U/L
ANION GAP SERPL CALC-SCNC: 8 MMOL/L
AST SERPL-CCNC: 29 U/L
BASOPHILS # BLD AUTO: 0.05 K/UL
BASOPHILS NFR BLD: 0.8 %
BILIRUB SERPL-MCNC: 0.4 MG/DL
BUN SERPL-MCNC: 21 MG/DL
CALCIUM SERPL-MCNC: 9.4 MG/DL
CHLORIDE SERPL-SCNC: 103 MMOL/L
CO2 SERPL-SCNC: 24 MMOL/L
CREAT SERPL-MCNC: 0.9 MG/DL
CRP SERPL-MCNC: 10.2 MG/L
DIFFERENTIAL METHOD: ABNORMAL
EOSINOPHIL # BLD AUTO: 0.2 K/UL
EOSINOPHIL NFR BLD: 3.6 %
ERYTHROCYTE [DISTWIDTH] IN BLOOD BY AUTOMATED COUNT: 12.9 %
ERYTHROCYTE [SEDIMENTATION RATE] IN BLOOD BY WESTERGREN METHOD: 35 MM/HR
EST. GFR  (AFRICAN AMERICAN): >60 ML/MIN/1.73 M^2
EST. GFR  (NON AFRICAN AMERICAN): >60 ML/MIN/1.73 M^2
GLUCOSE SERPL-MCNC: 92 MG/DL
HCT VFR BLD AUTO: 43.1 %
HGB BLD-MCNC: 14.3 G/DL
IMM GRANULOCYTES # BLD AUTO: 0.01 K/UL
IMM GRANULOCYTES NFR BLD AUTO: 0.2 %
LYMPHOCYTES # BLD AUTO: 1.6 K/UL
LYMPHOCYTES NFR BLD: 25.6 %
MCH RBC QN AUTO: 29.4 PG
MCHC RBC AUTO-ENTMCNC: 33.2 G/DL
MCV RBC AUTO: 89 FL
MONOCYTES # BLD AUTO: 1 K/UL
MONOCYTES NFR BLD: 15.6 %
NEUTROPHILS # BLD AUTO: 3.5 K/UL
NEUTROPHILS NFR BLD: 54.2 %
NRBC BLD-RTO: 0 /100 WBC
PLATELET # BLD AUTO: 207 K/UL
PMV BLD AUTO: 11 FL
POTASSIUM SERPL-SCNC: 4.5 MMOL/L
PROT SERPL-MCNC: 7.4 G/DL
RBC # BLD AUTO: 4.87 M/UL
SODIUM SERPL-SCNC: 135 MMOL/L
WBC # BLD AUTO: 6.4 K/UL

## 2019-03-07 PROCEDURE — 86140 C-REACTIVE PROTEIN: CPT

## 2019-03-07 PROCEDURE — 80053 COMPREHEN METABOLIC PANEL: CPT

## 2019-03-07 PROCEDURE — 85651 RBC SED RATE NONAUTOMATED: CPT | Mod: PO

## 2019-03-07 PROCEDURE — 85025 COMPLETE CBC W/AUTO DIFF WBC: CPT

## 2019-03-07 PROCEDURE — 36415 COLL VENOUS BLD VENIPUNCTURE: CPT | Mod: PO

## 2019-03-08 DIAGNOSIS — M05.79 SEROPOSITIVE RHEUMATOID ARTHRITIS OF MULTIPLE SITES: ICD-10-CM

## 2019-03-08 RX ORDER — LEFLUNOMIDE 20 MG/1
20 TABLET ORAL DAILY
Qty: 30 TABLET | Refills: 2 | Status: SHIPPED | OUTPATIENT
Start: 2019-03-08 | End: 2019-07-05

## 2019-04-03 ENCOUNTER — OFFICE VISIT (OUTPATIENT)
Dept: RHEUMATOLOGY | Facility: CLINIC | Age: 64
End: 2019-04-03
Payer: MEDICARE

## 2019-04-03 VITALS
HEIGHT: 69 IN | SYSTOLIC BLOOD PRESSURE: 132 MMHG | HEART RATE: 49 BPM | DIASTOLIC BLOOD PRESSURE: 74 MMHG | BODY MASS INDEX: 33.83 KG/M2 | WEIGHT: 228.38 LBS

## 2019-04-03 DIAGNOSIS — Z79.899 LONG-TERM USE OF HIGH-RISK MEDICATION: ICD-10-CM

## 2019-04-03 DIAGNOSIS — Z11.4 ENCOUNTER FOR SCREENING FOR HIV: ICD-10-CM

## 2019-04-03 DIAGNOSIS — M05.79 SEROPOSITIVE RHEUMATOID ARTHRITIS OF MULTIPLE SITES: Primary | ICD-10-CM

## 2019-04-03 DIAGNOSIS — R70.0 ELEVATED SED RATE: ICD-10-CM

## 2019-04-03 PROCEDURE — 99999 PR PBB SHADOW E&M-EST. PATIENT-LVL IV: CPT | Mod: PBBFAC,GC,, | Performed by: INTERNAL MEDICINE

## 2019-04-03 PROCEDURE — 99214 OFFICE O/P EST MOD 30 MIN: CPT | Mod: GC,S$GLB,, | Performed by: INTERNAL MEDICINE

## 2019-04-03 PROCEDURE — 99214 PR OFFICE/OUTPT VISIT, EST, LEVL IV, 30-39 MIN: ICD-10-PCS | Mod: GC,S$GLB,, | Performed by: INTERNAL MEDICINE

## 2019-04-03 PROCEDURE — 99999 PR PBB SHADOW E&M-EST. PATIENT-LVL IV: ICD-10-PCS | Mod: PBBFAC,GC,, | Performed by: INTERNAL MEDICINE

## 2019-04-03 PROCEDURE — 3008F PR BODY MASS INDEX (BMI) DOCUMENTED: ICD-10-PCS | Mod: CPTII,GC,S$GLB, | Performed by: INTERNAL MEDICINE

## 2019-04-03 PROCEDURE — 3008F BODY MASS INDEX DOCD: CPT | Mod: CPTII,GC,S$GLB, | Performed by: INTERNAL MEDICINE

## 2019-04-03 ASSESSMENT — ROUTINE ASSESSMENT OF PATIENT INDEX DATA (RAPID3)
AM STIFFNESS SCORE: 0, NO
PSYCHOLOGICAL DISTRESS SCORE: 1.1
TOTAL RAPID3 SCORE: 2
PATIENT GLOBAL ASSESSMENT SCORE: 2.5
MDHAQ FUNCTION SCORE: .3
FATIGUE SCORE: 5.5
PAIN SCORE: 2.5

## 2019-04-03 NOTE — PROGRESS NOTES
I have personally taken the history and examined the patient to verify the fellow's notation, and I agree with the impression and plan stated.    He has RA controlled on leflunomide monotherapy  He needs lab , etc for disease and drug monitoring .  WD

## 2019-04-03 NOTE — PATIENT INSTRUCTIONS
Labs in 3months    Follow up with your PCP if cough does not improve    Discuss with PCP for pneumovax vaccine

## 2019-04-03 NOTE — PROGRESS NOTES
Subjective:       Patient ID: Jaswinder Cifuentes is a 63 y.o. male.    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 He developed some joint pain/swelling which was persistent in 2014 and was started on Leflunomide which has stabilized his symptoms since then.     Pt reports 3 week history of productive cough ( whitish sputum) with subjective fever. He reports that he had initially some bloody specks in sputum which has cleared. Pt reports similar symptoms ~ 1yr ago which was addressed by PCP. Pt did not get evaluated by PCP this time and decided to do OTC meds with tessalon pearls with has helped his symptoms and is improving.     Pt denies weight changes, fatigue, oral/nasal/genital ulcers, headaches, fever, chills, n/v, abd pain, CP, SOB, dysphagia, hemoptysis, recent travel, changes in bowel/bladder habits, vision changes, skin rash, raynauds, joint swelling or erythema, family history of autoimmune disease (SLE,RA, CTD)    Worked as a , and , now retired. Denies EtOH, illicit drug use or tobacco use.     Past Medical History:   Diagnosis Date    Hypertension      History reviewed. No pertinent surgical history.  Current Outpatient Medications on File Prior to Visit   Medication Sig Dispense Refill    atorvastatin (LIPITOR) 10 MG tablet Take 10 mg by mouth once daily.      benazepril (LOTENSIN) 10 MG tablet       benzonatate (TESSALON) 100 MG capsule Take 100 mg by mouth 3 (three) times daily as needed for Cough.      escitalopram oxalate (LEXAPRO) 20 MG tablet Take 20 mg by mouth once daily.      etodolac (LODINE) 500 MG tablet Take 1 tablet (500 mg total) by  "mouth 2 (two) times daily. 60 tablet 2    leflunomide (ARAVA) 20 MG Tab Take 1 tablet (20 mg total) by mouth once daily. 30 tablet 2    metFORMIN (GLUCOPHAGE) 500 MG tablet Take 500 mg by mouth 2 (two) times daily with meals.      nebivolol (BYSTOLIC) 5 MG Tab Take 10 mg by mouth once daily.      tamsulosin (FLOMAX) 0.4 mg Cap Take 0.4 mg by mouth once daily.       No current facility-administered medications on file prior to visit.      Review of patient's allergies indicates:  No Known Allergies    Review of Systems   Constitutional: Positive for fatigue and fever.   HENT: Negative for mouth sores and trouble swallowing.    Eyes: Negative for visual disturbance.   Respiratory: Negative for chest tightness and shortness of breath.    Cardiovascular: Negative for chest pain.   Gastrointestinal: Negative for blood in stool, constipation, diarrhea, nausea and vomiting.   Genitourinary: Negative for difficulty urinating, hematuria and urgency.   Musculoskeletal: Negative for arthralgias and joint swelling.   Skin: Negative for color change and rash.   Neurological: Negative for weakness and light-headedness.   Psychiatric/Behavioral: Negative for decreased concentration and sleep disturbance.         Objective:   /74   Pulse (!) 49   Ht 5' 9" (1.753 m)   Wt 103.6 kg (228 lb 6.3 oz)   BMI 33.73 kg/m²      Physical Exam   Constitutional: He is oriented to person, place, and time and well-developed, well-nourished, and in no distress.   HENT:   Head: Normocephalic and atraumatic.   Eyes: EOM are normal. Pupils are equal, round, and reactive to light.   Neck: Normal range of motion. Neck supple.   Cardiovascular: Normal rate and regular rhythm.    Murmur heard.  Pulmonary/Chest: Effort normal and breath sounds normal.   Abdominal: Soft. Bowel sounds are normal.   Lymphadenopathy:     He has no cervical adenopathy.   Neurological: He is alert and oriented to person, place, and time.   Skin: Skin is warm and " dry. No rash noted.     Psychiatric: Affect normal.   Musculoskeletal: Normal range of motion. He exhibits no edema, tenderness or deformity.   No synovitis       Results for [facility] (MRN [id number]) as of 4/3/2019 14:24   Ref. Range 3/7/2019 10:48   WBC Latest Ref Range: 3.90 - 12.70 K/uL 6.40   RBC Latest Ref Range: 4.60 - 6.20 M/uL 4.87   Hemoglobin Latest Ref Range: 14.0 - 18.0 g/dL 14.3   Hematocrit Latest Ref Range: 40.0 - 54.0 % 43.1   MCV Latest Ref Range: 82 - 98 fL 89   MCH Latest Ref Range: 27.0 - 31.0 pg 29.4   MCHC Latest Ref Range: 32.0 - 36.0 g/dL 33.2   RDW Latest Ref Range: 11.5 - 14.5 % 12.9   Platelets Latest Ref Range: 150 - 350 K/uL 207   MPV Latest Ref Range: 9.2 - 12.9 fL 11.0   Gran% Latest Ref Range: 38.0 - 73.0 % 54.2   Gran # (ANC) Latest Ref Range: 1.8 - 7.7 K/uL 3.5   Lymph% Latest Ref Range: 18.0 - 48.0 % 25.6   Lymph # Latest Ref Range: 1.0 - 4.8 K/uL 1.6   Mono% Latest Ref Range: 4.0 - 15.0 % 15.6 (H)   Mono # Latest Ref Range: 0.3 - 1.0 K/uL 1.0   Eosinophil% Latest Ref Range: 0.0 - 8.0 % 3.6   Eos # Latest Ref Range: 0.0 - 0.5 K/uL 0.2   Basophil% Latest Ref Range: 0.0 - 1.9 % 0.8   Baso # Latest Ref Range: 0.00 - 0.20 K/uL 0.05   nRBC Latest Ref Range: 0 /100 WBC 0   Differential Method Unknown Automated   Immature Grans (Abs) Latest Ref Range: 0.00 - 0.04 K/uL 0.01   Immature Granulocytes Latest Ref Range: 0.0 - 0.5 % 0.2   Results for [facility] (MRN [id number]) as of 4/3/2019 14:24   Ref. Range 3/7/2019 10:48   Sodium Latest Ref Range: 136 - 145 mmol/L 135 (L)   Potassium Latest Ref Range: 3.5 - 5.1 mmol/L 4.5   Chloride Latest Ref Range: 95 - 110 mmol/L 103   CO2 Latest Ref Range: 23 - 29 mmol/L 24   Anion Gap Latest Ref Range: 8 - 16 mmol/L 8   BUN, Bld Latest Ref Range: 8 - 23 mg/dL 21   Creatinine Latest Ref Range: 0.5 - 1.4 mg/dL 0.9   eGFR if non African American Latest Ref Range: >60 mL/min/1.73 m^2 >60.0   eGFR if African American Latest Ref Range: >60  mL/min/1.73 m^2 >60.0   Glucose Latest Ref Range: 70 - 110 mg/dL 92   Calcium Latest Ref Range: 8.7 - 10.5 mg/dL 9.4   Alkaline Phosphatase Latest Ref Range: 55 - 135 U/L 95   Total Protein Latest Ref Range: 6.0 - 8.4 g/dL 7.4   Albumin Latest Ref Range: 3.5 - 5.2 g/dL 3.7   Total Bilirubin Latest Ref Range: 0.1 - 1.0 mg/dL 0.4   AST Latest Ref Range: 10 - 40 U/L 29   ALT Latest Ref Range: 10 - 44 U/L 29     Results for [facility] (MRN [id number]) as of 4/3/2019 14:24   Ref. Range 7/3/2018 10:08 10/29/2018 11:05 3/7/2019 10:48   Sed Rate Latest Ref Range: 0 - 10 mm/Hr 27 (H) 45 (H) 35 (H)      Results for [facility] (MRN [id number]) as of 4/3/2019 14:24   Ref. Range 7/3/2018 10:08 10/29/2018 11:05 3/7/2019 10:48   CRP Latest Ref Range: 0.0 - 8.2 mg/L 3.7 4.6 10.2 (H)   Results for [facility] (MRN [id number]) as of 4/3/2019 14:24   Ref. Range 11/19/2004 08:38   CHRISTIANO Latest Ref Range: Neg <1:160  Negative   CCP Antibodies Latest Units: U/mL 9.1   Rheumatoid Factor Latest Ref Range: 0 - 15 IU/ml 209 (H)     Xray arthritis survey 05/2014  No prior films for comparison.  There is mild narrowing at the C5 -- 6 level.  No significant subluxation.  AP standing knees demonstrate the femoral tibial joints are well-maintained.  AP hands demonstrate some irregularity about the distal left radius.This may be related to prior trauma.  Several small geodes are seen in the carpal bones left more so than right.  IP joints are fairly well maintained.  AP feet demonstrate narrowing at the IP joint of the great toe with some erosions or geodes seen about the IP joint of the right great toe.  Small cysts are also seen in the right fifth metatarsal head.    Assessment:       1. Seropositive rheumatoid arthritis of multiple sites    2. Long-term use of high-risk medication    3. Encounter for screening for HIV     4. Elevated sed rate            Plan:         Jaswinder was seen today for disease management.    Diagnoses and all  orders for this visit:    Seropositive rheumatoid arthritis of multiple sites  TJC 0, SJC 0. DAS28 2.84 c/w low disease activity. ESR 45--> 35, CRP 4.6--> 10.2 elevated inflammatory markers could be related to recent URI. CBC wnl. Normal renal and liver function. Pt is doing well from RA standpoint. Xray done 2014 showed JSN, bone cysts, erosions at IP joints  Will obtain pre-DMARD labs as well as Xray arthritis survey.   Continue Leflunomide 20mg daily, advised to hold with serious infections that require antibiotics and can restart after completion.   -     HIV 1/2 Ag/Ab (4th Gen); Future  -     Hepatitis B surface antigen; Future  -     HBcAB; Future  -     Hepatitis B surface antibody; Future  -     Hepatitis C antibody; Future  -     Hepatitis A antibody, IgG; Future  -     Strongyloides IgG Antibodies; Future  -     Quantiferon Gold TB; Future  -     RPR; Future  -     Varicella zoster antibody, IgG; Future  -     X-ray Arthritis Survey; Future  -     Sedimentation rate; Future  -     C-reactive protein; Future  -     CBC auto differential; Future  -     Comprehensive metabolic panel; Future    Long-term use of high-risk medication  No evidence of toxicity    Encounter for screening for HIV   -     HIV 1/2 Ag/Ab (4th Gen); Future    Elevated sed rate  -     Protein electrophoresis, serum; Future  -     Immunofixation electrophoresis; Future    RTC in 3months

## 2019-05-02 ENCOUNTER — HOSPITAL ENCOUNTER (OUTPATIENT)
Dept: RADIOLOGY | Facility: CLINIC | Age: 64
Discharge: HOME | End: 2019-05-02
Attending: INTERNAL MEDICINE
Payer: MEDICARE

## 2019-05-02 DIAGNOSIS — M05.79 SEROPOSITIVE RHEUMATOID ARTHRITIS OF MULTIPLE SITES: ICD-10-CM

## 2019-05-02 PROCEDURE — 77077 JOINT SURVEY SINGLE VIEW: CPT | Mod: 26,,, | Performed by: RADIOLOGY

## 2019-05-02 PROCEDURE — 77077 XR ARTHRITIS SURVEY: ICD-10-PCS | Mod: 26,,, | Performed by: RADIOLOGY

## 2019-05-02 PROCEDURE — 77077 JOINT SURVEY SINGLE VIEW: CPT | Mod: TC,PO

## 2019-07-05 ENCOUNTER — OFFICE VISIT (OUTPATIENT)
Dept: RHEUMATOLOGY | Facility: CLINIC | Age: 64
End: 2019-07-05
Payer: MEDICARE

## 2019-07-05 VITALS
HEIGHT: 69 IN | SYSTOLIC BLOOD PRESSURE: 124 MMHG | BODY MASS INDEX: 34.68 KG/M2 | WEIGHT: 234.13 LBS | HEART RATE: 54 BPM | DIASTOLIC BLOOD PRESSURE: 74 MMHG

## 2019-07-05 DIAGNOSIS — M05.79 SEROPOSITIVE RHEUMATOID ARTHRITIS OF MULTIPLE SITES: Primary | ICD-10-CM

## 2019-07-05 DIAGNOSIS — Z79.899 LONG-TERM USE OF HIGH-RISK MEDICATION: ICD-10-CM

## 2019-07-05 PROCEDURE — 99999 PR PBB SHADOW E&M-EST. PATIENT-LVL III: CPT | Mod: PBBFAC,,, | Performed by: INTERNAL MEDICINE

## 2019-07-05 PROCEDURE — 3008F BODY MASS INDEX DOCD: CPT | Mod: CPTII,S$GLB,, | Performed by: INTERNAL MEDICINE

## 2019-07-05 PROCEDURE — 99214 PR OFFICE/OUTPT VISIT, EST, LEVL IV, 30-39 MIN: ICD-10-PCS | Mod: S$GLB,,, | Performed by: INTERNAL MEDICINE

## 2019-07-05 PROCEDURE — 3008F PR BODY MASS INDEX (BMI) DOCUMENTED: ICD-10-PCS | Mod: CPTII,S$GLB,, | Performed by: INTERNAL MEDICINE

## 2019-07-05 PROCEDURE — 99214 OFFICE O/P EST MOD 30 MIN: CPT | Mod: S$GLB,,, | Performed by: INTERNAL MEDICINE

## 2019-07-05 PROCEDURE — 99999 PR PBB SHADOW E&M-EST. PATIENT-LVL III: ICD-10-PCS | Mod: PBBFAC,,, | Performed by: INTERNAL MEDICINE

## 2019-07-05 RX ORDER — LEFLUNOMIDE 20 MG/1
20 TABLET ORAL DAILY
Qty: 30 TABLET | Refills: 2 | Status: SHIPPED | OUTPATIENT
Start: 2019-07-05 | End: 2019-10-11 | Stop reason: SDUPTHER

## 2019-07-05 ASSESSMENT — ROUTINE ASSESSMENT OF PATIENT INDEX DATA (RAPID3)
PATIENT GLOBAL ASSESSMENT SCORE: 2
MDHAQ FUNCTION SCORE: .2
WHEN YOU AWAKENED IN THE MORNING OVER THE LAST WEEK, PLEASE INDICATE THE AMOUNT OF TIME IT TAKES UNTIL YOU ARE AS LIMBER AS YOU WILL BE FOR THE DAY: 15M
FATIGUE SCORE: 6.5
TOTAL RAPID3 SCORE: 1.56
AM STIFFNESS SCORE: 1, YES
PSYCHOLOGICAL DISTRESS SCORE: 1
PAIN SCORE: 2

## 2019-07-05 NOTE — PROGRESS NOTES
"Subjective:       Patient ID: Jaswinder Cifuentes is a 64 y.o. male.    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   HENT: Negative for mouth sores.    Respiratory: Negative for cough and shortness of breath.    Cardiovascular: Negative for chest pain.   Gastrointestinal: Negative for diarrhea.   Genitourinary: Negative for dysuria.   Skin: Negative for rash.   Neurological: Negative for headaches.   Psychiatric/Behavioral: Negative for sleep disturbance.         Objective:   /74   Pulse (!) 54   Ht 5' 9" (1.753 m)   Wt 106.2 kg (234 lb 2.1 oz)   BMI 34.57 kg/m²      Physical Exam   Constitutional: He is well-developed, well-nourished, and in no distress.   HENT:   Mouth/Throat: No oropharyngeal exudate.   Eyes: No scleral icterus.   Cardiovascular: Normal rate and regular rhythm.    Murmur heard.  Pulmonary/Chest: He has no wheezes. He has no rales.   Abdominal: There is no tenderness.   Lymphadenopathy:     He has no cervical adenopathy.   Skin: No rash noted.     Musculoskeletal: He exhibits no edema or tenderness.          Physical Exam     JONES-28 tender joint count: 0  JONES-28 swollen joint count: 0  ESR (mm/hr): 32  Patient global assessment: 20  JONES-28 score: 2.71 (Low Disease Activity)    Lab Results   Component Value Date    SEDRATE 32 (H) 05/02/2019     Lab Results   Component Value Date    CRP 4.3 05/02/2019      Assessment:       1. Seropositive " rheumatoid arthritis of multiple sites    2. Long-term use of high-risk medication            Plan:       Problem List Items Addressed This Visit        Active Problems    Long-term use of high-risk medication     Normal pre-DMARD panel May 2019  Normal surveillance lab May   No clinical adverse effects     Will plan cont lab q3m and RTC 4-6 mo         Seropositive rheumatoid arthritis of multiple sites - Primary     In remission on leflunomide  No problem with medication  Labs ok in May            Will plan f/u with Dr Parra if available

## 2019-07-05 NOTE — ASSESSMENT & PLAN NOTE
Normal pre-DMARD panel May 2019  Normal surveillance lab May   No clinical adverse effects     Will plan cont lab q3m and RTC 4-6 mo

## 2019-08-05 ENCOUNTER — LAB VISIT (OUTPATIENT)
Dept: LAB | Facility: HOSPITAL | Age: 64
End: 2019-08-05
Attending: INTERNAL MEDICINE
Payer: MEDICARE

## 2019-08-05 DIAGNOSIS — M05.79 SEROPOSITIVE RHEUMATOID ARTHRITIS OF MULTIPLE SITES: ICD-10-CM

## 2019-08-05 DIAGNOSIS — Z79.899 LONG-TERM USE OF HIGH-RISK MEDICATION: ICD-10-CM

## 2019-08-05 LAB
ALBUMIN SERPL BCP-MCNC: 3.6 G/DL (ref 3.5–5.2)
ALP SERPL-CCNC: 96 U/L (ref 55–135)
ALT SERPL W/O P-5'-P-CCNC: 22 U/L (ref 10–44)
ANION GAP SERPL CALC-SCNC: 10 MMOL/L (ref 8–16)
AST SERPL-CCNC: 23 U/L (ref 10–40)
BASOPHILS # BLD AUTO: 0.07 K/UL (ref 0–0.2)
BASOPHILS NFR BLD: 1 % (ref 0–1.9)
BILIRUB SERPL-MCNC: 0.3 MG/DL (ref 0.1–1)
BUN SERPL-MCNC: 25 MG/DL (ref 8–23)
CALCIUM SERPL-MCNC: 9.5 MG/DL (ref 8.7–10.5)
CHLORIDE SERPL-SCNC: 105 MMOL/L (ref 95–110)
CO2 SERPL-SCNC: 23 MMOL/L (ref 23–29)
CREAT SERPL-MCNC: 1.1 MG/DL (ref 0.5–1.4)
CRP SERPL-MCNC: 8.3 MG/L (ref 0–8.2)
DIFFERENTIAL METHOD: ABNORMAL
EOSINOPHIL # BLD AUTO: 0.2 K/UL (ref 0–0.5)
EOSINOPHIL NFR BLD: 3.2 % (ref 0–8)
ERYTHROCYTE [DISTWIDTH] IN BLOOD BY AUTOMATED COUNT: 12.5 % (ref 11.5–14.5)
ERYTHROCYTE [SEDIMENTATION RATE] IN BLOOD BY WESTERGREN METHOD: 38 MM/HR (ref 0–10)
EST. GFR  (AFRICAN AMERICAN): >60 ML/MIN/1.73 M^2
EST. GFR  (NON AFRICAN AMERICAN): >60 ML/MIN/1.73 M^2
GLUCOSE SERPL-MCNC: 105 MG/DL (ref 70–110)
HCT VFR BLD AUTO: 44 % (ref 40–54)
HGB BLD-MCNC: 14.3 G/DL (ref 14–18)
IMM GRANULOCYTES # BLD AUTO: 0.01 K/UL (ref 0–0.04)
IMM GRANULOCYTES NFR BLD AUTO: 0.1 % (ref 0–0.5)
LYMPHOCYTES # BLD AUTO: 1.6 K/UL (ref 1–4.8)
LYMPHOCYTES NFR BLD: 22.7 % (ref 18–48)
MCH RBC QN AUTO: 29.4 PG (ref 27–31)
MCHC RBC AUTO-ENTMCNC: 32.5 G/DL (ref 32–36)
MCV RBC AUTO: 90 FL (ref 82–98)
MONOCYTES # BLD AUTO: 1.1 K/UL (ref 0.3–1)
MONOCYTES NFR BLD: 15.3 % (ref 4–15)
NEUTROPHILS # BLD AUTO: 4.1 K/UL (ref 1.8–7.7)
NEUTROPHILS NFR BLD: 57.7 % (ref 38–73)
NRBC BLD-RTO: 0 /100 WBC
PLATELET # BLD AUTO: 215 K/UL (ref 150–350)
PMV BLD AUTO: 10.7 FL (ref 9.2–12.9)
POTASSIUM SERPL-SCNC: 4.3 MMOL/L (ref 3.5–5.1)
PROT SERPL-MCNC: 7.2 G/DL (ref 6–8.4)
RBC # BLD AUTO: 4.87 M/UL (ref 4.6–6.2)
SODIUM SERPL-SCNC: 138 MMOL/L (ref 136–145)
WBC # BLD AUTO: 7.14 K/UL (ref 3.9–12.7)

## 2019-08-05 PROCEDURE — 85025 COMPLETE CBC W/AUTO DIFF WBC: CPT

## 2019-08-05 PROCEDURE — 36415 COLL VENOUS BLD VENIPUNCTURE: CPT | Mod: PO

## 2019-08-05 PROCEDURE — 85651 RBC SED RATE NONAUTOMATED: CPT | Mod: PO

## 2019-08-05 PROCEDURE — 80053 COMPREHEN METABOLIC PANEL: CPT

## 2019-08-05 PROCEDURE — 86140 C-REACTIVE PROTEIN: CPT

## 2019-08-07 RX ORDER — ETODOLAC 500 MG/1
500 TABLET, FILM COATED ORAL 2 TIMES DAILY
Qty: 60 TABLET | Refills: 2 | Status: SHIPPED | OUTPATIENT
Start: 2019-08-07 | End: 2020-10-28 | Stop reason: SDUPTHER

## 2019-08-17 NOTE — PROGRESS NOTES
[facility] Note  Staff: TOBY Barone    PCP: Dr. Abrahan Esparza    Chief Complaint: Follow-up    Subjective:        HPI: Jaswinder Cifuentes is a 64 y.o. male presents today for follow-up and routine recheck of his lower urinary tract symptoms.    Pt was last seen by Dr. Real on 2/12/2019 for evaluation of BPH/LUTS.    Current  meds: Flomax 0.4 mg daily which he is tolerating well with no problems at this time.    PMHx of HTN, HLD, PA, DM, AS, RA  He was last seen by Dr Esparza on 1/15/19 noting AP is likely the cause of his daytime somnolence and was referred to Dr Ritchie for sleep study, and was started on flomax for his LUTS and referred for eval.   PSA 3.3 on 1/16/19, 1.9 on 1/9/18, 3.1 on 12/30/16, 2.2 in 2014, 1.2 in 2012     Was getting up 2-4x at night and had bothersome DTF Q2h or sooner. Does drink a lot of coffee and beer.  Coffee 3 cups in AM and beer 2 in evening  No history of hematuria or dysuria.  Has seen Dr Ritchie and will be arranging sleep study soon.  Pre flomax AUA SS: 31/4, mostly dissatisfied (5:  Frequency, intermittency, weak stream; 4:  Emptying, urgency, straining, sleeping)  Symptoms problematic since last summer - august     Since starting flomax has had less urgency/frequency and less nocturia.  No longer feels like he will urinate on himself before he gets to the bathroom  Since starting Flomax his AUA symptom score is now 7/2 (2:  Weak stream, nocturia; 1:  Emptying, frequency, intermittency) and he notes that medication helped 9/10  Daytime frequency is now about 5 times, voiding approximately every 4-5 hours rather than every 2 hr or less  Now 1 cup coffee in AM and 3x/week few beers.  No real soda, tea, spicy foods  No constipation.  No family history of prostate cancer.  Taking flomax in evening  Not much of a water drinker  No dizziness no retrograde ejaculation.     PVR TODAY:  49 mL performed in office.  AUA SS Today:  5/3  Mixed  Feeling of ICBE:0  Frequency:2  Intermittency:1  Urgency:0  Weak urine stream:0  Strainin  Nocturia:2    REVIEW OF SYSTEMS:  A comprehensive 10 system review was performed and is negative except as noted above in HPI    PMHx:  Past Medical History:   Diagnosis Date    BPH with obstruction/lower urinary tract symptoms     Hypertension     Seropositive rheumatoid arthritis      PSHx:  History reviewed. No pertinent surgical history.    Allergies:  Patient has no known allergies.    Medications: reviewed   Objective:     Vitals:    19 0928   BP: 119/74   Pulse: 64   Resp: 18   Temp: 98.6 °F (37 °C)     General:WDWN in NAD  Eyes: PERRLA, normal conjunctiva  Respiratory: no increased work on breathing, clear to auscultation  Cardiovascular: regular rate and rhythm. No obvious extremity edema.  GI: palpation of masses. No tenderness. No hepatosplenomegaly to palpation.  Musculoskeletal: normal range of motion of bilateral upper extremities. Normal muscle strength and tone.  Skin: no obvious rashes or lesions. No tightening of skin noted.  Neurologic: CN grossly normal. Normal sensation.   Psychiatric: awake, alert and oriented x 3. Mood and affect normal. Cooperative.    LABS REVIEW:  UA today:  Color:Clear, Yellow  Spec. Grav.  1.010  PH  5.5  Small WBCs  Assessment:       1. Benign prostatic hyperplasia with lower urinary tract symptoms, symptom details unspecified          Plan:   BPH with LUTS:    Continue Flomax 0.4 mg daily as previously prescribed.    F/u with Dr. Real in one year with PSA to be done prior to ov.  Pt verbalized understanding.    [facility]: Active    Laure Santizo, TOBY

## 2019-08-19 ENCOUNTER — OFFICE VISIT (OUTPATIENT)
Dept: UROLOGY | Facility: CLINIC | Age: 64
End: 2019-08-19
Payer: MEDICARE

## 2019-08-19 VITALS
SYSTOLIC BLOOD PRESSURE: 119 MMHG | BODY MASS INDEX: 33.83 KG/M2 | HEART RATE: 64 BPM | RESPIRATION RATE: 18 BRPM | TEMPERATURE: 99 F | HEIGHT: 69 IN | DIASTOLIC BLOOD PRESSURE: 74 MMHG | WEIGHT: 228.38 LBS

## 2019-08-19 DIAGNOSIS — Z12.5 ENCOUNTER FOR SCREENING FOR MALIGNANT NEOPLASM OF PROSTATE: ICD-10-CM

## 2019-08-19 DIAGNOSIS — N40.1 BENIGN PROSTATIC HYPERPLASIA WITH LOWER URINARY TRACT SYMPTOMS, SYMPTOM DETAILS UNSPECIFIED: Primary | ICD-10-CM

## 2019-08-19 LAB
BILIRUB SERPL-MCNC: ABNORMAL MG/DL
BLOOD URINE, POC: ABNORMAL
COLOR, POC UA: YELLOW
GLUCOSE UR QL STRIP: ABNORMAL
KETONES UR QL STRIP: ABNORMAL
LEUKOCYTE ESTERASE URINE, POC: ABNORMAL
NITRITE, POC UA: ABNORMAL
PH, POC UA: 5.5
POC RESIDUAL URINE VOLUME: 49 ML (ref 0–100)
PROTEIN, POC: ABNORMAL
SPECIFIC GRAVITY, POC UA: 1.01
UROBILINOGEN, POC UA: 0.2

## 2019-08-19 PROCEDURE — 81002 POCT URINE DIPSTICK WITHOUT MICROSCOPE: ICD-10-PCS | Mod: S$GLB,,, | Performed by: NURSE PRACTITIONER

## 2019-08-19 PROCEDURE — 3008F PR BODY MASS INDEX (BMI) DOCUMENTED: ICD-10-PCS | Mod: CPTII,S$GLB,, | Performed by: NURSE PRACTITIONER

## 2019-08-19 PROCEDURE — 3008F BODY MASS INDEX DOCD: CPT | Mod: CPTII,S$GLB,, | Performed by: NURSE PRACTITIONER

## 2019-08-19 PROCEDURE — 81002 URINALYSIS NONAUTO W/O SCOPE: CPT | Mod: S$GLB,,, | Performed by: NURSE PRACTITIONER

## 2019-08-19 PROCEDURE — 51798 PR MEAS,POST-VOID RES,US,NON-IMAGING: ICD-10-PCS | Mod: S$GLB,,, | Performed by: NURSE PRACTITIONER

## 2019-08-19 PROCEDURE — 99213 PR OFFICE/OUTPT VISIT, EST, LEVL III, 20-29 MIN: ICD-10-PCS | Mod: 25,S$GLB,, | Performed by: NURSE PRACTITIONER

## 2019-08-19 PROCEDURE — 99999 PR PBB SHADOW E&M-EST. PATIENT-LVL III: CPT | Mod: PBBFAC,,, | Performed by: NURSE PRACTITIONER

## 2019-08-19 PROCEDURE — 51798 US URINE CAPACITY MEASURE: CPT | Mod: S$GLB,,, | Performed by: NURSE PRACTITIONER

## 2019-08-19 PROCEDURE — 99999 PR PBB SHADOW E&M-EST. PATIENT-LVL III: ICD-10-PCS | Mod: PBBFAC,,, | Performed by: NURSE PRACTITIONER

## 2019-08-19 PROCEDURE — 99213 OFFICE O/P EST LOW 20 MIN: CPT | Mod: 25,S$GLB,, | Performed by: NURSE PRACTITIONER

## 2019-08-19 RX ORDER — BENAZEPRIL HYDROCHLORIDE 20 MG/1
40 TABLET ORAL
Refills: 1 | COMMUNITY
Start: 2019-08-13 | End: 2021-02-03 | Stop reason: DRUGHIGH

## 2019-10-11 DIAGNOSIS — M05.79 SEROPOSITIVE RHEUMATOID ARTHRITIS OF MULTIPLE SITES: ICD-10-CM

## 2019-10-11 RX ORDER — LEFLUNOMIDE 20 MG/1
20 TABLET ORAL DAILY
Qty: 30 TABLET | Refills: 2 | Status: SHIPPED | OUTPATIENT
Start: 2019-10-11 | End: 2020-06-17

## 2019-10-29 ENCOUNTER — LAB VISIT (OUTPATIENT)
Dept: LAB | Facility: HOSPITAL | Age: 64
End: 2019-10-29
Attending: INTERNAL MEDICINE
Payer: MEDICARE

## 2019-10-29 DIAGNOSIS — M05.79 SEROPOSITIVE RHEUMATOID ARTHRITIS OF MULTIPLE SITES: ICD-10-CM

## 2019-10-29 DIAGNOSIS — Z79.899 LONG-TERM USE OF HIGH-RISK MEDICATION: ICD-10-CM

## 2019-10-29 LAB
ALBUMIN SERPL BCP-MCNC: 3.6 G/DL (ref 3.5–5.2)
ALP SERPL-CCNC: 91 U/L (ref 55–135)
ALT SERPL W/O P-5'-P-CCNC: 24 U/L (ref 10–44)
ANION GAP SERPL CALC-SCNC: 6 MMOL/L (ref 8–16)
AST SERPL-CCNC: 23 U/L (ref 10–40)
BASOPHILS # BLD AUTO: 0.06 K/UL (ref 0–0.2)
BASOPHILS NFR BLD: 0.9 % (ref 0–1.9)
BILIRUB SERPL-MCNC: 0.3 MG/DL (ref 0.1–1)
BUN SERPL-MCNC: 15 MG/DL (ref 8–23)
CALCIUM SERPL-MCNC: 9.8 MG/DL (ref 8.7–10.5)
CHLORIDE SERPL-SCNC: 101 MMOL/L (ref 95–110)
CO2 SERPL-SCNC: 28 MMOL/L (ref 23–29)
CREAT SERPL-MCNC: 1 MG/DL (ref 0.5–1.4)
CRP SERPL-MCNC: 6.8 MG/L (ref 0–8.2)
DIFFERENTIAL METHOD: ABNORMAL
EOSINOPHIL # BLD AUTO: 0.2 K/UL (ref 0–0.5)
EOSINOPHIL NFR BLD: 2.8 % (ref 0–8)
ERYTHROCYTE [DISTWIDTH] IN BLOOD BY AUTOMATED COUNT: 12.9 % (ref 11.5–14.5)
ERYTHROCYTE [SEDIMENTATION RATE] IN BLOOD BY WESTERGREN METHOD: 37 MM/HR (ref 0–10)
EST. GFR  (AFRICAN AMERICAN): >60 ML/MIN/1.73 M^2
EST. GFR  (NON AFRICAN AMERICAN): >60 ML/MIN/1.73 M^2
GLUCOSE SERPL-MCNC: 97 MG/DL (ref 70–110)
HCT VFR BLD AUTO: 43.1 % (ref 40–54)
HGB BLD-MCNC: 13.9 G/DL (ref 14–18)
IMM GRANULOCYTES # BLD AUTO: 0.03 K/UL (ref 0–0.04)
IMM GRANULOCYTES NFR BLD AUTO: 0.5 % (ref 0–0.5)
LYMPHOCYTES # BLD AUTO: 1.3 K/UL (ref 1–4.8)
LYMPHOCYTES NFR BLD: 19.8 % (ref 18–48)
MCH RBC QN AUTO: 29.1 PG (ref 27–31)
MCHC RBC AUTO-ENTMCNC: 32.3 G/DL (ref 32–36)
MCV RBC AUTO: 90 FL (ref 82–98)
MONOCYTES # BLD AUTO: 0.9 K/UL (ref 0.3–1)
MONOCYTES NFR BLD: 14.1 % (ref 4–15)
NEUTROPHILS # BLD AUTO: 4 K/UL (ref 1.8–7.7)
NEUTROPHILS NFR BLD: 61.9 % (ref 38–73)
NRBC BLD-RTO: 0 /100 WBC
PLATELET # BLD AUTO: 218 K/UL (ref 150–350)
PMV BLD AUTO: 10.4 FL (ref 9.2–12.9)
POTASSIUM SERPL-SCNC: 4.8 MMOL/L (ref 3.5–5.1)
PROT SERPL-MCNC: 7.2 G/DL (ref 6–8.4)
RBC # BLD AUTO: 4.77 M/UL (ref 4.6–6.2)
SODIUM SERPL-SCNC: 135 MMOL/L (ref 136–145)
WBC # BLD AUTO: 6.4 K/UL (ref 3.9–12.7)

## 2019-10-29 PROCEDURE — 36415 COLL VENOUS BLD VENIPUNCTURE: CPT | Mod: PO

## 2019-10-29 PROCEDURE — 80053 COMPREHEN METABOLIC PANEL: CPT

## 2019-10-29 PROCEDURE — 85651 RBC SED RATE NONAUTOMATED: CPT | Mod: PO

## 2019-10-29 PROCEDURE — 86140 C-REACTIVE PROTEIN: CPT

## 2019-10-29 PROCEDURE — 85025 COMPLETE CBC W/AUTO DIFF WBC: CPT

## 2019-11-20 ENCOUNTER — OFFICE VISIT (OUTPATIENT)
Dept: RHEUMATOLOGY | Facility: CLINIC | Age: 64
End: 2019-11-20
Payer: MEDICARE

## 2019-11-20 VITALS
HEART RATE: 59 BPM | SYSTOLIC BLOOD PRESSURE: 114 MMHG | WEIGHT: 234.81 LBS | DIASTOLIC BLOOD PRESSURE: 72 MMHG | BODY MASS INDEX: 34.78 KG/M2 | HEIGHT: 69 IN

## 2019-11-20 DIAGNOSIS — Z79.899 LONG-TERM USE OF HIGH-RISK MEDICATION: ICD-10-CM

## 2019-11-20 DIAGNOSIS — M05.79 SEROPOSITIVE RHEUMATOID ARTHRITIS OF MULTIPLE SITES: Primary | ICD-10-CM

## 2019-11-20 PROCEDURE — 99213 PR OFFICE/OUTPT VISIT, EST, LEVL III, 20-29 MIN: ICD-10-PCS | Mod: S$GLB,,, | Performed by: INTERNAL MEDICINE

## 2019-11-20 PROCEDURE — 3008F BODY MASS INDEX DOCD: CPT | Mod: CPTII,S$GLB,, | Performed by: INTERNAL MEDICINE

## 2019-11-20 PROCEDURE — 99999 PR PBB SHADOW E&M-EST. PATIENT-LVL III: CPT | Mod: PBBFAC,,, | Performed by: INTERNAL MEDICINE

## 2019-11-20 PROCEDURE — 99999 PR PBB SHADOW E&M-EST. PATIENT-LVL III: ICD-10-PCS | Mod: PBBFAC,,, | Performed by: INTERNAL MEDICINE

## 2019-11-20 PROCEDURE — 99213 OFFICE O/P EST LOW 20 MIN: CPT | Mod: S$GLB,,, | Performed by: INTERNAL MEDICINE

## 2019-11-20 PROCEDURE — 3008F PR BODY MASS INDEX (BMI) DOCUMENTED: ICD-10-PCS | Mod: CPTII,S$GLB,, | Performed by: INTERNAL MEDICINE

## 2019-11-20 ASSESSMENT — ROUTINE ASSESSMENT OF PATIENT INDEX DATA (RAPID3)
PATIENT GLOBAL ASSESSMENT SCORE: 2.5
MDHAQ FUNCTION SCORE: .3
FATIGUE SCORE: 6
AM STIFFNESS SCORE: 0, NO
PAIN SCORE: 2
PSYCHOLOGICAL DISTRESS SCORE: 1.1
TOTAL RAPID3 SCORE: 1.83

## 2019-11-20 NOTE — PROGRESS NOTES
History of present illness:  64-year-old gentleman with hypertension, diabetes, and BPH.  I have been following him for 10 years for seropositive rheumatoid arthritis.  He has no evidence of erosive disease.  He has been on leflunomide since 2014.  His arthritis has been stable since that time.  He has had no recent joint swelling.  He has occasional aching for which he takes Lodine.  He does this about once a week.  He has had no morning stiffness.  He is tolerating the medication.  He has had no fever or infections.  He has had no other recent medical problems.    Physical examination:  Musculoskeletal:  He has full range of motion of all joints.  He has no soft tissue swelling, erythema, or increased warmth.  He has no tender areas to palpation.    Assessment:  Stable rheumatoid arthritis    Plans:  1.  Continue leflunomide 20 mg daily  2.  Continue Lodine as needed  3.  Laboratory studies in 4 months  4.  Return to see me in 8 months.

## 2020-03-20 ENCOUNTER — LAB VISIT (OUTPATIENT)
Dept: LAB | Facility: HOSPITAL | Age: 65
End: 2020-03-20
Attending: INTERNAL MEDICINE
Payer: MEDICARE

## 2020-03-20 DIAGNOSIS — M05.79 SEROPOSITIVE RHEUMATOID ARTHRITIS OF MULTIPLE SITES: ICD-10-CM

## 2020-03-20 DIAGNOSIS — Z79.899 LONG-TERM USE OF HIGH-RISK MEDICATION: ICD-10-CM

## 2020-03-20 LAB
ALBUMIN SERPL BCP-MCNC: 3.6 G/DL (ref 3.5–5.2)
ALP SERPL-CCNC: 97 U/L (ref 55–135)
ALT SERPL W/O P-5'-P-CCNC: 19 U/L (ref 10–44)
ANION GAP SERPL CALC-SCNC: 9 MMOL/L (ref 8–16)
AST SERPL-CCNC: 25 U/L (ref 10–40)
BASOPHILS # BLD AUTO: 0.06 K/UL (ref 0–0.2)
BASOPHILS NFR BLD: 1.2 % (ref 0–1.9)
BILIRUB SERPL-MCNC: 0.5 MG/DL (ref 0.1–1)
BUN SERPL-MCNC: 15 MG/DL (ref 8–23)
CALCIUM SERPL-MCNC: 8.9 MG/DL (ref 8.7–10.5)
CHLORIDE SERPL-SCNC: 105 MMOL/L (ref 95–110)
CO2 SERPL-SCNC: 24 MMOL/L (ref 23–29)
CREAT SERPL-MCNC: 1 MG/DL (ref 0.5–1.4)
CRP SERPL-MCNC: 8.4 MG/L (ref 0–8.2)
DIFFERENTIAL METHOD: NORMAL
EOSINOPHIL # BLD AUTO: 0.2 K/UL (ref 0–0.5)
EOSINOPHIL NFR BLD: 4.7 % (ref 0–8)
ERYTHROCYTE [DISTWIDTH] IN BLOOD BY AUTOMATED COUNT: 12.7 % (ref 11.5–14.5)
ERYTHROCYTE [SEDIMENTATION RATE] IN BLOOD BY WESTERGREN METHOD: 46 MM/HR (ref 0–10)
EST. GFR  (AFRICAN AMERICAN): >60 ML/MIN/1.73 M^2
EST. GFR  (NON AFRICAN AMERICAN): >60 ML/MIN/1.73 M^2
GLUCOSE SERPL-MCNC: 94 MG/DL (ref 70–110)
HCT VFR BLD AUTO: 44.6 % (ref 40–54)
HGB BLD-MCNC: 14.4 G/DL (ref 14–18)
IMM GRANULOCYTES # BLD AUTO: 0.01 K/UL (ref 0–0.04)
IMM GRANULOCYTES NFR BLD AUTO: 0.2 % (ref 0–0.5)
LYMPHOCYTES # BLD AUTO: 1.4 K/UL (ref 1–4.8)
LYMPHOCYTES NFR BLD: 27.7 % (ref 18–48)
MCH RBC QN AUTO: 28.7 PG (ref 27–31)
MCHC RBC AUTO-ENTMCNC: 32.3 G/DL (ref 32–36)
MCV RBC AUTO: 89 FL (ref 82–98)
MONOCYTES # BLD AUTO: 0.6 K/UL (ref 0.3–1)
MONOCYTES NFR BLD: 12.4 % (ref 4–15)
NEUTROPHILS # BLD AUTO: 2.8 K/UL (ref 1.8–7.7)
NEUTROPHILS NFR BLD: 53.8 % (ref 38–73)
NRBC BLD-RTO: 0 /100 WBC
PLATELET # BLD AUTO: 174 K/UL (ref 150–350)
PMV BLD AUTO: 10.7 FL (ref 9.2–12.9)
POTASSIUM SERPL-SCNC: 4.4 MMOL/L (ref 3.5–5.1)
PROT SERPL-MCNC: 7.7 G/DL (ref 6–8.4)
RBC # BLD AUTO: 5.02 M/UL (ref 4.6–6.2)
SODIUM SERPL-SCNC: 138 MMOL/L (ref 136–145)
WBC # BLD AUTO: 5.16 K/UL (ref 3.9–12.7)

## 2020-03-20 PROCEDURE — 85651 RBC SED RATE NONAUTOMATED: CPT | Mod: PO

## 2020-03-20 PROCEDURE — 80053 COMPREHEN METABOLIC PANEL: CPT

## 2020-03-20 PROCEDURE — 85025 COMPLETE CBC W/AUTO DIFF WBC: CPT

## 2020-03-20 PROCEDURE — 86140 C-REACTIVE PROTEIN: CPT

## 2020-03-20 PROCEDURE — 36415 COLL VENOUS BLD VENIPUNCTURE: CPT | Mod: PO

## 2020-04-09 ENCOUNTER — PATIENT MESSAGE (OUTPATIENT)
Dept: RHEUMATOLOGY | Facility: CLINIC | Age: 65
End: 2020-04-09

## 2020-05-22 ENCOUNTER — PATIENT MESSAGE (OUTPATIENT)
Dept: RHEUMATOLOGY | Facility: CLINIC | Age: 65
End: 2020-05-22

## 2020-07-17 ENCOUNTER — LAB VISIT (OUTPATIENT)
Dept: LAB | Facility: HOSPITAL | Age: 65
End: 2020-07-17
Attending: INTERNAL MEDICINE
Payer: MEDICARE

## 2020-07-17 DIAGNOSIS — Z79.899 LONG-TERM USE OF HIGH-RISK MEDICATION: ICD-10-CM

## 2020-07-17 DIAGNOSIS — M05.79 SEROPOSITIVE RHEUMATOID ARTHRITIS OF MULTIPLE SITES: ICD-10-CM

## 2020-07-17 LAB
ALBUMIN SERPL BCP-MCNC: 3.5 G/DL (ref 3.5–5.2)
ALP SERPL-CCNC: 77 U/L (ref 55–135)
ALT SERPL W/O P-5'-P-CCNC: 20 U/L (ref 10–44)
ANION GAP SERPL CALC-SCNC: 6 MMOL/L (ref 8–16)
AST SERPL-CCNC: 24 U/L (ref 10–40)
BASOPHILS # BLD AUTO: 0.05 K/UL (ref 0–0.2)
BASOPHILS NFR BLD: 0.7 % (ref 0–1.9)
BILIRUB SERPL-MCNC: 0.3 MG/DL (ref 0.1–1)
BUN SERPL-MCNC: 18 MG/DL (ref 8–23)
CALCIUM SERPL-MCNC: 8.8 MG/DL (ref 8.7–10.5)
CHLORIDE SERPL-SCNC: 109 MMOL/L (ref 95–110)
CO2 SERPL-SCNC: 24 MMOL/L (ref 23–29)
CREAT SERPL-MCNC: 1 MG/DL (ref 0.5–1.4)
CRP SERPL-MCNC: 4 MG/L (ref 0–8.2)
DIFFERENTIAL METHOD: ABNORMAL
EOSINOPHIL # BLD AUTO: 0.2 K/UL (ref 0–0.5)
EOSINOPHIL NFR BLD: 3.4 % (ref 0–8)
ERYTHROCYTE [DISTWIDTH] IN BLOOD BY AUTOMATED COUNT: 13.7 % (ref 11.5–14.5)
ERYTHROCYTE [SEDIMENTATION RATE] IN BLOOD BY WESTERGREN METHOD: 36 MM/HR (ref 0–10)
EST. GFR  (AFRICAN AMERICAN): >60 ML/MIN/1.73 M^2
EST. GFR  (NON AFRICAN AMERICAN): >60 ML/MIN/1.73 M^2
GLUCOSE SERPL-MCNC: 86 MG/DL (ref 70–110)
HCT VFR BLD AUTO: 42.7 % (ref 40–54)
HGB BLD-MCNC: 13.9 G/DL (ref 14–18)
IMM GRANULOCYTES # BLD AUTO: 0.02 K/UL (ref 0–0.04)
IMM GRANULOCYTES NFR BLD AUTO: 0.3 % (ref 0–0.5)
LYMPHOCYTES # BLD AUTO: 1.5 K/UL (ref 1–4.8)
LYMPHOCYTES NFR BLD: 22.6 % (ref 18–48)
MCH RBC QN AUTO: 29.3 PG (ref 27–31)
MCHC RBC AUTO-ENTMCNC: 32.6 G/DL (ref 32–36)
MCV RBC AUTO: 90 FL (ref 82–98)
MONOCYTES # BLD AUTO: 0.9 K/UL (ref 0.3–1)
MONOCYTES NFR BLD: 12.7 % (ref 4–15)
NEUTROPHILS # BLD AUTO: 4.1 K/UL (ref 1.8–7.7)
NEUTROPHILS NFR BLD: 60.3 % (ref 38–73)
NRBC BLD-RTO: 0 /100 WBC
PLATELET # BLD AUTO: 196 K/UL (ref 150–350)
PMV BLD AUTO: 11 FL (ref 9.2–12.9)
POTASSIUM SERPL-SCNC: 4.6 MMOL/L (ref 3.5–5.1)
PROT SERPL-MCNC: 7.1 G/DL (ref 6–8.4)
RBC # BLD AUTO: 4.75 M/UL (ref 4.6–6.2)
SODIUM SERPL-SCNC: 139 MMOL/L (ref 136–145)
WBC # BLD AUTO: 6.76 K/UL (ref 3.9–12.7)

## 2020-07-17 PROCEDURE — 80053 COMPREHEN METABOLIC PANEL: CPT

## 2020-07-17 PROCEDURE — 86140 C-REACTIVE PROTEIN: CPT

## 2020-07-17 PROCEDURE — 85025 COMPLETE CBC W/AUTO DIFF WBC: CPT

## 2020-07-17 PROCEDURE — 36415 COLL VENOUS BLD VENIPUNCTURE: CPT | Mod: PO

## 2020-07-17 PROCEDURE — 85651 RBC SED RATE NONAUTOMATED: CPT | Mod: PO

## 2020-07-24 LAB
CHOLESTEROL, TOTAL: 186
HDLC SERPL-MCNC: 36 MG/DL
LDLC SERPL CALC-MCNC: 116 MG/DL
TRIGL SERPL-MCNC: 170 MG/DL

## 2020-10-09 DIAGNOSIS — M05.79 SEROPOSITIVE RHEUMATOID ARTHRITIS OF MULTIPLE SITES: ICD-10-CM

## 2020-10-09 RX ORDER — LEFLUNOMIDE 20 MG/1
TABLET ORAL
Qty: 30 TABLET | Refills: 1 | Status: SHIPPED | OUTPATIENT
Start: 2020-10-09 | End: 2020-12-22 | Stop reason: SDUPTHER

## 2020-11-12 ENCOUNTER — TELEPHONE (OUTPATIENT)
Dept: UROLOGY | Facility: CLINIC | Age: 65
End: 2020-11-12

## 2020-11-12 NOTE — TELEPHONE ENCOUNTER
Spoke w pt and offered earlier appt for annual exam pt declined at this time due to he is out of town. Pt voiced he will wait until Jan for visit.

## 2020-12-14 ENCOUNTER — LAB VISIT (OUTPATIENT)
Dept: LAB | Facility: HOSPITAL | Age: 65
End: 2020-12-14
Attending: INTERNAL MEDICINE
Payer: MEDICARE

## 2020-12-14 DIAGNOSIS — M05.79 SEROPOSITIVE RHEUMATOID ARTHRITIS OF MULTIPLE SITES: ICD-10-CM

## 2020-12-14 DIAGNOSIS — Z79.899 LONG-TERM USE OF HIGH-RISK MEDICATION: ICD-10-CM

## 2020-12-14 LAB
BASOPHILS # BLD AUTO: 0.05 K/UL (ref 0–0.2)
BASOPHILS NFR BLD: 0.8 % (ref 0–1.9)
DIFFERENTIAL METHOD: ABNORMAL
EOSINOPHIL # BLD AUTO: 0.2 K/UL (ref 0–0.5)
EOSINOPHIL NFR BLD: 3.1 % (ref 0–8)
ERYTHROCYTE [DISTWIDTH] IN BLOOD BY AUTOMATED COUNT: 13.1 % (ref 11.5–14.5)
ERYTHROCYTE [SEDIMENTATION RATE] IN BLOOD BY WESTERGREN METHOD: 48 MM/HR (ref 0–10)
HCT VFR BLD AUTO: 39.7 % (ref 40–54)
HGB BLD-MCNC: 13 G/DL (ref 14–18)
IMM GRANULOCYTES # BLD AUTO: 0.01 K/UL (ref 0–0.04)
IMM GRANULOCYTES NFR BLD AUTO: 0.2 % (ref 0–0.5)
LYMPHOCYTES # BLD AUTO: 1.5 K/UL (ref 1–4.8)
LYMPHOCYTES NFR BLD: 23.9 % (ref 18–48)
MCH RBC QN AUTO: 29.1 PG (ref 27–31)
MCHC RBC AUTO-ENTMCNC: 32.7 G/DL (ref 32–36)
MCV RBC AUTO: 89 FL (ref 82–98)
MONOCYTES # BLD AUTO: 0.9 K/UL (ref 0.3–1)
MONOCYTES NFR BLD: 13.9 % (ref 4–15)
NEUTROPHILS # BLD AUTO: 3.5 K/UL (ref 1.8–7.7)
NEUTROPHILS NFR BLD: 58.1 % (ref 38–73)
NRBC BLD-RTO: 0 /100 WBC
PLATELET # BLD AUTO: 186 K/UL (ref 150–350)
PMV BLD AUTO: 11.6 FL (ref 9.2–12.9)
RBC # BLD AUTO: 4.47 M/UL (ref 4.6–6.2)
WBC # BLD AUTO: 6.1 K/UL (ref 3.9–12.7)

## 2020-12-14 PROCEDURE — 85025 COMPLETE CBC W/AUTO DIFF WBC: CPT

## 2020-12-14 PROCEDURE — 86140 C-REACTIVE PROTEIN: CPT

## 2020-12-14 PROCEDURE — 85651 RBC SED RATE NONAUTOMATED: CPT | Mod: PO

## 2020-12-14 PROCEDURE — 36415 COLL VENOUS BLD VENIPUNCTURE: CPT | Mod: PO

## 2020-12-14 PROCEDURE — 80053 COMPREHEN METABOLIC PANEL: CPT

## 2020-12-15 LAB
ALBUMIN SERPL BCP-MCNC: 3.6 G/DL (ref 3.5–5.2)
ALP SERPL-CCNC: 102 U/L (ref 55–135)
ALT SERPL W/O P-5'-P-CCNC: 30 U/L (ref 10–44)
ANION GAP SERPL CALC-SCNC: 9 MMOL/L (ref 8–16)
AST SERPL-CCNC: 29 U/L (ref 10–40)
BILIRUB SERPL-MCNC: 0.5 MG/DL (ref 0.1–1)
BUN SERPL-MCNC: 15 MG/DL (ref 8–23)
CALCIUM SERPL-MCNC: 9.2 MG/DL (ref 8.7–10.5)
CHLORIDE SERPL-SCNC: 102 MMOL/L (ref 95–110)
CO2 SERPL-SCNC: 25 MMOL/L (ref 23–29)
CREAT SERPL-MCNC: 0.9 MG/DL (ref 0.5–1.4)
CRP SERPL-MCNC: 7 MG/L (ref 0–8.2)
EST. GFR  (AFRICAN AMERICAN): >60 ML/MIN/1.73 M^2
EST. GFR  (NON AFRICAN AMERICAN): >60 ML/MIN/1.73 M^2
GLUCOSE SERPL-MCNC: 119 MG/DL (ref 70–110)
POTASSIUM SERPL-SCNC: 4 MMOL/L (ref 3.5–5.1)
PROT SERPL-MCNC: 7.2 G/DL (ref 6–8.4)
SODIUM SERPL-SCNC: 136 MMOL/L (ref 136–145)

## 2020-12-18 ENCOUNTER — PATIENT MESSAGE (OUTPATIENT)
Dept: RHEUMATOLOGY | Facility: CLINIC | Age: 65
End: 2020-12-18

## 2020-12-21 DIAGNOSIS — M05.79 SEROPOSITIVE RHEUMATOID ARTHRITIS OF MULTIPLE SITES: ICD-10-CM

## 2020-12-22 RX ORDER — LEFLUNOMIDE 20 MG/1
20 TABLET ORAL DAILY
Qty: 30 TABLET | Refills: 4 | Status: SHIPPED | OUTPATIENT
Start: 2020-12-22 | End: 2021-01-25 | Stop reason: SDUPTHER

## 2021-01-05 ENCOUNTER — OFFICE VISIT (OUTPATIENT)
Dept: UROLOGY | Facility: CLINIC | Age: 66
End: 2021-01-05
Payer: MEDICARE

## 2021-01-05 ENCOUNTER — LAB VISIT (OUTPATIENT)
Dept: LAB | Facility: HOSPITAL | Age: 66
End: 2021-01-05
Attending: UROLOGY
Payer: MEDICARE

## 2021-01-05 VITALS
WEIGHT: 223.69 LBS | HEART RATE: 91 BPM | DIASTOLIC BLOOD PRESSURE: 84 MMHG | HEIGHT: 69 IN | BODY MASS INDEX: 33.13 KG/M2 | SYSTOLIC BLOOD PRESSURE: 136 MMHG

## 2021-01-05 DIAGNOSIS — N13.8 BPH WITH OBSTRUCTION/LOWER URINARY TRACT SYMPTOMS: Primary | ICD-10-CM

## 2021-01-05 DIAGNOSIS — Z12.5 PROSTATE CANCER SCREENING: ICD-10-CM

## 2021-01-05 DIAGNOSIS — N40.1 BPH WITH OBSTRUCTION/LOWER URINARY TRACT SYMPTOMS: Primary | ICD-10-CM

## 2021-01-05 LAB
BILIRUB SERPL-MCNC: ABNORMAL MG/DL
BLOOD URINE, POC: ABNORMAL
CLARITY, POC UA: CLEAR
COLOR, POC UA: YELLOW
COMPLEXED PSA SERPL-MCNC: 2.8 NG/ML (ref 0–4)
GLUCOSE UR QL STRIP: ABNORMAL
KETONES UR QL STRIP: ABNORMAL
LEUKOCYTE ESTERASE URINE, POC: ABNORMAL
NITRITE, POC UA: ABNORMAL
PH, POC UA: 6
PROTEIN, POC: ABNORMAL
SPECIFIC GRAVITY, POC UA: 1.02
UROBILINOGEN, POC UA: 0.2

## 2021-01-05 PROCEDURE — 3288F FALL RISK ASSESSMENT DOCD: CPT | Mod: CPTII,S$GLB,, | Performed by: UROLOGY

## 2021-01-05 PROCEDURE — 3008F BODY MASS INDEX DOCD: CPT | Mod: CPTII,S$GLB,, | Performed by: UROLOGY

## 2021-01-05 PROCEDURE — 99999 PR PBB SHADOW E&M-EST. PATIENT-LVL III: ICD-10-PCS | Mod: PBBFAC,,, | Performed by: UROLOGY

## 2021-01-05 PROCEDURE — 81002 POCT URINE DIPSTICK WITHOUT MICROSCOPE: ICD-10-PCS | Mod: S$GLB,,, | Performed by: UROLOGY

## 2021-01-05 PROCEDURE — 3288F PR FALLS RISK ASSESSMENT DOCUMENTED: ICD-10-PCS | Mod: CPTII,S$GLB,, | Performed by: UROLOGY

## 2021-01-05 PROCEDURE — 84153 ASSAY OF PSA TOTAL: CPT

## 2021-01-05 PROCEDURE — 1126F AMNT PAIN NOTED NONE PRSNT: CPT | Mod: S$GLB,,, | Performed by: UROLOGY

## 2021-01-05 PROCEDURE — 1101F PT FALLS ASSESS-DOCD LE1/YR: CPT | Mod: CPTII,S$GLB,, | Performed by: UROLOGY

## 2021-01-05 PROCEDURE — 1126F PR PAIN SEVERITY QUANTIFIED, NO PAIN PRESENT: ICD-10-PCS | Mod: S$GLB,,, | Performed by: UROLOGY

## 2021-01-05 PROCEDURE — 36415 COLL VENOUS BLD VENIPUNCTURE: CPT

## 2021-01-05 PROCEDURE — 1101F PR PT FALLS ASSESS DOC 0-1 FALLS W/OUT INJ PAST YR: ICD-10-PCS | Mod: CPTII,S$GLB,, | Performed by: UROLOGY

## 2021-01-05 PROCEDURE — 99999 PR PBB SHADOW E&M-EST. PATIENT-LVL III: CPT | Mod: PBBFAC,,, | Performed by: UROLOGY

## 2021-01-05 PROCEDURE — 81002 URINALYSIS NONAUTO W/O SCOPE: CPT | Mod: S$GLB,,, | Performed by: UROLOGY

## 2021-01-05 PROCEDURE — 3008F PR BODY MASS INDEX (BMI) DOCUMENTED: ICD-10-PCS | Mod: CPTII,S$GLB,, | Performed by: UROLOGY

## 2021-01-05 PROCEDURE — 99213 PR OFFICE/OUTPT VISIT, EST, LEVL III, 20-29 MIN: ICD-10-PCS | Mod: 25,S$GLB,, | Performed by: UROLOGY

## 2021-01-05 PROCEDURE — 99213 OFFICE O/P EST LOW 20 MIN: CPT | Mod: 25,S$GLB,, | Performed by: UROLOGY

## 2021-01-05 RX ORDER — TAMSULOSIN HYDROCHLORIDE 0.4 MG/1
0.4 CAPSULE ORAL DAILY
Qty: 30 CAPSULE | Refills: 11 | Status: SHIPPED | OUTPATIENT
Start: 2021-01-05 | End: 2021-02-15 | Stop reason: SDUPTHER

## 2021-01-05 RX ORDER — OMEPRAZOLE 20 MG/1
20 CAPSULE, DELAYED RELEASE ORAL DAILY
COMMUNITY
End: 2021-02-22 | Stop reason: SDUPTHER

## 2021-01-05 RX ORDER — METOPROLOL SUCCINATE 50 MG/1
50 TABLET, EXTENDED RELEASE ORAL DAILY
COMMUNITY
End: 2021-02-15 | Stop reason: SDUPTHER

## 2021-01-06 ENCOUNTER — TELEPHONE (OUTPATIENT)
Dept: FAMILY MEDICINE | Facility: CLINIC | Age: 66
End: 2021-01-06

## 2021-01-06 RX ORDER — ATORVASTATIN CALCIUM 10 MG/1
10 TABLET, FILM COATED ORAL DAILY
Qty: 30 TABLET | Refills: 0 | Status: SHIPPED | OUTPATIENT
Start: 2021-01-06 | End: 2021-02-05 | Stop reason: SDUPTHER

## 2021-01-08 ENCOUNTER — TELEPHONE (OUTPATIENT)
Dept: FAMILY MEDICINE | Facility: CLINIC | Age: 66
End: 2021-01-08

## 2021-01-25 DIAGNOSIS — M05.79 SEROPOSITIVE RHEUMATOID ARTHRITIS OF MULTIPLE SITES: ICD-10-CM

## 2021-01-25 RX ORDER — LEFLUNOMIDE 20 MG/1
20 TABLET ORAL DAILY
Qty: 30 TABLET | Refills: 4 | Status: SHIPPED | OUTPATIENT
Start: 2021-01-25 | End: 2021-02-15 | Stop reason: SDUPTHER

## 2021-02-03 ENCOUNTER — OFFICE VISIT (OUTPATIENT)
Dept: FAMILY MEDICINE | Facility: CLINIC | Age: 66
End: 2021-02-03
Payer: MEDICARE

## 2021-02-03 VITALS
DIASTOLIC BLOOD PRESSURE: 84 MMHG | BODY MASS INDEX: 33.4 KG/M2 | HEART RATE: 80 BPM | SYSTOLIC BLOOD PRESSURE: 124 MMHG | OXYGEN SATURATION: 93 % | HEIGHT: 69 IN | WEIGHT: 225.5 LBS | TEMPERATURE: 97 F

## 2021-02-03 DIAGNOSIS — N40.0 BENIGN PROSTATIC HYPERPLASIA WITHOUT LOWER URINARY TRACT SYMPTOMS: ICD-10-CM

## 2021-02-03 DIAGNOSIS — R73.03 PRE-DIABETES: ICD-10-CM

## 2021-02-03 DIAGNOSIS — I10 GOOD HYPERTENSION CONTROL: ICD-10-CM

## 2021-02-03 DIAGNOSIS — M05.79 SEROPOSITIVE RHEUMATOID ARTHRITIS OF MULTIPLE SITES: ICD-10-CM

## 2021-02-03 DIAGNOSIS — E78.2 MIXED HYPERLIPIDEMIA: ICD-10-CM

## 2021-02-03 DIAGNOSIS — R01.1 HEART MURMUR: ICD-10-CM

## 2021-02-03 PROCEDURE — 1101F PR PT FALLS ASSESS DOC 0-1 FALLS W/OUT INJ PAST YR: ICD-10-PCS | Mod: CPTII,S$GLB,, | Performed by: PHYSICIAN ASSISTANT

## 2021-02-03 PROCEDURE — 3288F FALL RISK ASSESSMENT DOCD: CPT | Mod: CPTII,S$GLB,, | Performed by: PHYSICIAN ASSISTANT

## 2021-02-03 PROCEDURE — 1101F PT FALLS ASSESS-DOCD LE1/YR: CPT | Mod: CPTII,S$GLB,, | Performed by: PHYSICIAN ASSISTANT

## 2021-02-03 PROCEDURE — 99214 OFFICE O/P EST MOD 30 MIN: CPT | Mod: S$GLB,,, | Performed by: PHYSICIAN ASSISTANT

## 2021-02-03 PROCEDURE — 3008F BODY MASS INDEX DOCD: CPT | Mod: CPTII,S$GLB,, | Performed by: PHYSICIAN ASSISTANT

## 2021-02-03 PROCEDURE — 1126F AMNT PAIN NOTED NONE PRSNT: CPT | Mod: S$GLB,,, | Performed by: PHYSICIAN ASSISTANT

## 2021-02-03 PROCEDURE — 3008F PR BODY MASS INDEX (BMI) DOCUMENTED: ICD-10-PCS | Mod: CPTII,S$GLB,, | Performed by: PHYSICIAN ASSISTANT

## 2021-02-03 PROCEDURE — 99999 PR PBB SHADOW E&M-EST. PATIENT-LVL IV: CPT | Mod: PBBFAC,,, | Performed by: PHYSICIAN ASSISTANT

## 2021-02-03 PROCEDURE — 99999 PR PBB SHADOW E&M-EST. PATIENT-LVL IV: ICD-10-PCS | Mod: PBBFAC,,, | Performed by: PHYSICIAN ASSISTANT

## 2021-02-03 PROCEDURE — 3288F PR FALLS RISK ASSESSMENT DOCUMENTED: ICD-10-PCS | Mod: CPTII,S$GLB,, | Performed by: PHYSICIAN ASSISTANT

## 2021-02-03 PROCEDURE — 99214 PR OFFICE/OUTPT VISIT, EST, LEVL IV, 30-39 MIN: ICD-10-PCS | Mod: S$GLB,,, | Performed by: PHYSICIAN ASSISTANT

## 2021-02-03 PROCEDURE — 1126F PR PAIN SEVERITY QUANTIFIED, NO PAIN PRESENT: ICD-10-PCS | Mod: S$GLB,,, | Performed by: PHYSICIAN ASSISTANT

## 2021-02-03 RX ORDER — BENAZEPRIL HYDROCHLORIDE 40 MG/1
40 TABLET ORAL DAILY
COMMUNITY

## 2021-02-03 RX ORDER — METFORMIN HYDROCHLORIDE 500 MG/1
500 TABLET, EXTENDED RELEASE ORAL
COMMUNITY
End: 2021-03-11 | Stop reason: SDUPTHER

## 2021-02-05 ENCOUNTER — PATIENT MESSAGE (OUTPATIENT)
Dept: FAMILY MEDICINE | Facility: CLINIC | Age: 66
End: 2021-02-05

## 2021-02-05 ENCOUNTER — PATIENT OUTREACH (OUTPATIENT)
Dept: ADMINISTRATIVE | Facility: HOSPITAL | Age: 66
End: 2021-02-05

## 2021-02-05 RX ORDER — ATORVASTATIN CALCIUM 10 MG/1
10 TABLET, FILM COATED ORAL DAILY
Qty: 30 TABLET | Refills: 0 | Status: CANCELLED | OUTPATIENT
Start: 2021-02-05

## 2021-02-05 RX ORDER — ATORVASTATIN CALCIUM 10 MG/1
10 TABLET, FILM COATED ORAL DAILY
Qty: 30 TABLET | Refills: 6 | Status: SHIPPED | OUTPATIENT
Start: 2021-02-05 | End: 2021-08-03 | Stop reason: ALTCHOICE

## 2021-02-15 ENCOUNTER — PATIENT MESSAGE (OUTPATIENT)
Dept: FAMILY MEDICINE | Facility: CLINIC | Age: 66
End: 2021-02-15

## 2021-02-15 DIAGNOSIS — M05.79 SEROPOSITIVE RHEUMATOID ARTHRITIS OF MULTIPLE SITES: ICD-10-CM

## 2021-02-15 RX ORDER — METOPROLOL SUCCINATE 50 MG/1
50 TABLET, EXTENDED RELEASE ORAL DAILY
Qty: 30 TABLET | Refills: 11 | Status: SHIPPED | OUTPATIENT
Start: 2021-02-15

## 2021-02-15 RX ORDER — LEFLUNOMIDE 20 MG/1
20 TABLET ORAL DAILY
Qty: 30 TABLET | Refills: 1 | Status: SHIPPED | OUTPATIENT
Start: 2021-02-15 | End: 2021-04-14 | Stop reason: SDUPTHER

## 2021-02-17 ENCOUNTER — PATIENT MESSAGE (OUTPATIENT)
Dept: FAMILY MEDICINE | Facility: CLINIC | Age: 66
End: 2021-02-17

## 2021-02-22 ENCOUNTER — PATIENT MESSAGE (OUTPATIENT)
Dept: FAMILY MEDICINE | Facility: CLINIC | Age: 66
End: 2021-02-22

## 2021-02-22 RX ORDER — OMEPRAZOLE 20 MG/1
20 CAPSULE, DELAYED RELEASE ORAL DAILY
Qty: 90 CAPSULE | Refills: 1 | Status: SHIPPED | OUTPATIENT
Start: 2021-02-22 | End: 2021-08-13

## 2021-02-23 ENCOUNTER — PATIENT MESSAGE (OUTPATIENT)
Dept: RHEUMATOLOGY | Facility: CLINIC | Age: 66
End: 2021-02-23

## 2021-03-06 ENCOUNTER — IMMUNIZATION (OUTPATIENT)
Dept: PRIMARY CARE CLINIC | Facility: CLINIC | Age: 66
End: 2021-03-06
Payer: MEDICARE

## 2021-03-06 DIAGNOSIS — Z23 NEED FOR VACCINATION: Primary | ICD-10-CM

## 2021-03-06 PROCEDURE — 0031A COVID-19,VECTOR-NR,RS-AD26,PF,0.5 ML DOSE VACCINE (JANSSEN): CPT | Mod: CV19,S$GLB,, | Performed by: FAMILY MEDICINE

## 2021-03-06 PROCEDURE — 91303 COVID-19,VECTOR-NR,RS-AD26,PF,0.5 ML DOSE VACCINE (JANSSEN): CPT | Mod: S$GLB,,, | Performed by: FAMILY MEDICINE

## 2021-03-06 PROCEDURE — 91303 COVID-19,VECTOR-NR,RS-AD26,PF,0.5 ML DOSE VACCINE (JANSSEN): ICD-10-PCS | Mod: S$GLB,,, | Performed by: FAMILY MEDICINE

## 2021-03-06 PROCEDURE — 0031A COVID-19,VECTOR-NR,RS-AD26,PF,0.5 ML DOSE VACCINE (JANSSEN): ICD-10-PCS | Mod: CV19,S$GLB,, | Performed by: FAMILY MEDICINE

## 2021-03-11 ENCOUNTER — PATIENT MESSAGE (OUTPATIENT)
Dept: FAMILY MEDICINE | Facility: CLINIC | Age: 66
End: 2021-03-11

## 2021-03-11 RX ORDER — METFORMIN HYDROCHLORIDE 500 MG/1
500 TABLET, EXTENDED RELEASE ORAL
Qty: 90 TABLET | Refills: 2 | Status: SHIPPED | OUTPATIENT
Start: 2021-03-11 | End: 2022-01-11

## 2021-03-15 ENCOUNTER — PATIENT MESSAGE (OUTPATIENT)
Dept: FAMILY MEDICINE | Facility: CLINIC | Age: 66
End: 2021-03-15

## 2021-04-14 DIAGNOSIS — M05.79 SEROPOSITIVE RHEUMATOID ARTHRITIS OF MULTIPLE SITES: ICD-10-CM

## 2021-04-14 RX ORDER — LEFLUNOMIDE 20 MG/1
20 TABLET ORAL DAILY
Qty: 30 TABLET | Refills: 0 | Status: SHIPPED | OUTPATIENT
Start: 2021-04-14 | End: 2021-06-04 | Stop reason: SDUPTHER

## 2021-06-04 ENCOUNTER — PATIENT MESSAGE (OUTPATIENT)
Dept: RHEUMATOLOGY | Facility: CLINIC | Age: 66
End: 2021-06-04

## 2021-06-04 DIAGNOSIS — M05.79 SEROPOSITIVE RHEUMATOID ARTHRITIS OF MULTIPLE SITES: ICD-10-CM

## 2021-06-04 DIAGNOSIS — Z79.899 LONG-TERM USE OF HIGH-RISK MEDICATION: Primary | ICD-10-CM

## 2021-06-04 RX ORDER — LEFLUNOMIDE 20 MG/1
20 TABLET ORAL DAILY
Qty: 30 TABLET | Refills: 0 | Status: SHIPPED | OUTPATIENT
Start: 2021-06-04 | End: 2021-07-06 | Stop reason: SDUPTHER

## 2021-06-08 ENCOUNTER — PATIENT MESSAGE (OUTPATIENT)
Dept: FAMILY MEDICINE | Facility: CLINIC | Age: 66
End: 2021-06-08

## 2021-06-08 DIAGNOSIS — E78.2 MIXED HYPERLIPIDEMIA: Primary | ICD-10-CM

## 2021-06-09 ENCOUNTER — PATIENT MESSAGE (OUTPATIENT)
Dept: FAMILY MEDICINE | Facility: CLINIC | Age: 66
End: 2021-06-09

## 2021-06-16 ENCOUNTER — LAB VISIT (OUTPATIENT)
Dept: LAB | Facility: HOSPITAL | Age: 66
End: 2021-06-16
Attending: INTERNAL MEDICINE
Payer: MEDICARE

## 2021-06-16 DIAGNOSIS — Z79.899 LONG-TERM USE OF HIGH-RISK MEDICATION: ICD-10-CM

## 2021-06-16 DIAGNOSIS — M05.79 SEROPOSITIVE RHEUMATOID ARTHRITIS OF MULTIPLE SITES: ICD-10-CM

## 2021-06-16 DIAGNOSIS — E78.2 MIXED HYPERLIPIDEMIA: ICD-10-CM

## 2021-06-16 LAB
ALBUMIN SERPL BCP-MCNC: 3.7 G/DL (ref 3.5–5.2)
ALP SERPL-CCNC: 110 U/L (ref 55–135)
ALT SERPL W/O P-5'-P-CCNC: 18 U/L (ref 10–44)
ANION GAP SERPL CALC-SCNC: 12 MMOL/L (ref 8–16)
AST SERPL-CCNC: 36 U/L (ref 10–40)
BILIRUB SERPL-MCNC: 0.4 MG/DL (ref 0.1–1)
BUN SERPL-MCNC: 17 MG/DL (ref 8–23)
CALCIUM SERPL-MCNC: 9.9 MG/DL (ref 8.7–10.5)
CHLORIDE SERPL-SCNC: 104 MMOL/L (ref 95–110)
CHOLEST SERPL-MCNC: 172 MG/DL (ref 120–199)
CHOLEST/HDLC SERPL: 5.5 {RATIO} (ref 2–5)
CO2 SERPL-SCNC: 21 MMOL/L (ref 23–29)
CREAT SERPL-MCNC: 1.1 MG/DL (ref 0.5–1.4)
CRP SERPL-MCNC: 22.7 MG/L (ref 0–8.2)
ERYTHROCYTE [SEDIMENTATION RATE] IN BLOOD BY WESTERGREN METHOD: 68 MM/HR (ref 0–10)
EST. GFR  (AFRICAN AMERICAN): >60 ML/MIN/1.73 M^2
EST. GFR  (NON AFRICAN AMERICAN): >60 ML/MIN/1.73 M^2
GLUCOSE SERPL-MCNC: 82 MG/DL (ref 70–110)
HDLC SERPL-MCNC: 31 MG/DL (ref 40–75)
HDLC SERPL: 18 % (ref 20–50)
LDLC SERPL CALC-MCNC: 123.4 MG/DL (ref 63–159)
NONHDLC SERPL-MCNC: 141 MG/DL
POTASSIUM SERPL-SCNC: 4.8 MMOL/L (ref 3.5–5.1)
PROT SERPL-MCNC: 8.2 G/DL (ref 6–8.4)
SODIUM SERPL-SCNC: 137 MMOL/L (ref 136–145)
TRIGL SERPL-MCNC: 88 MG/DL (ref 30–150)

## 2021-06-16 PROCEDURE — 36415 COLL VENOUS BLD VENIPUNCTURE: CPT | Mod: PO | Performed by: INTERNAL MEDICINE

## 2021-06-16 PROCEDURE — 80053 COMPREHEN METABOLIC PANEL: CPT | Performed by: INTERNAL MEDICINE

## 2021-06-16 PROCEDURE — 86140 C-REACTIVE PROTEIN: CPT | Performed by: INTERNAL MEDICINE

## 2021-06-16 PROCEDURE — 85651 RBC SED RATE NONAUTOMATED: CPT | Mod: PO | Performed by: INTERNAL MEDICINE

## 2021-06-16 PROCEDURE — 80061 LIPID PANEL: CPT | Performed by: FAMILY MEDICINE

## 2021-06-22 ENCOUNTER — OFFICE VISIT (OUTPATIENT)
Dept: RHEUMATOLOGY | Facility: CLINIC | Age: 66
End: 2021-06-22
Payer: MEDICARE

## 2021-06-22 VITALS
HEIGHT: 69 IN | SYSTOLIC BLOOD PRESSURE: 122 MMHG | WEIGHT: 228.81 LBS | BODY MASS INDEX: 33.89 KG/M2 | HEART RATE: 75 BPM | DIASTOLIC BLOOD PRESSURE: 74 MMHG

## 2021-06-22 DIAGNOSIS — M05.79 SEROPOSITIVE RHEUMATOID ARTHRITIS OF MULTIPLE SITES: Primary | ICD-10-CM

## 2021-06-22 DIAGNOSIS — Z79.899 LONG-TERM USE OF HIGH-RISK MEDICATION: ICD-10-CM

## 2021-06-22 PROCEDURE — 3288F PR FALLS RISK ASSESSMENT DOCUMENTED: ICD-10-PCS | Mod: CPTII,S$GLB,, | Performed by: INTERNAL MEDICINE

## 2021-06-22 PROCEDURE — 1101F PR PT FALLS ASSESS DOC 0-1 FALLS W/OUT INJ PAST YR: ICD-10-PCS | Mod: CPTII,S$GLB,, | Performed by: INTERNAL MEDICINE

## 2021-06-22 PROCEDURE — 99999 PR PBB SHADOW E&M-EST. PATIENT-LVL III: ICD-10-PCS | Mod: PBBFAC,,, | Performed by: INTERNAL MEDICINE

## 2021-06-22 PROCEDURE — 3008F BODY MASS INDEX DOCD: CPT | Mod: CPTII,S$GLB,, | Performed by: INTERNAL MEDICINE

## 2021-06-22 PROCEDURE — 1126F PR PAIN SEVERITY QUANTIFIED, NO PAIN PRESENT: ICD-10-PCS | Mod: S$GLB,,, | Performed by: INTERNAL MEDICINE

## 2021-06-22 PROCEDURE — 1101F PT FALLS ASSESS-DOCD LE1/YR: CPT | Mod: CPTII,S$GLB,, | Performed by: INTERNAL MEDICINE

## 2021-06-22 PROCEDURE — 99999 PR PBB SHADOW E&M-EST. PATIENT-LVL III: CPT | Mod: PBBFAC,,, | Performed by: INTERNAL MEDICINE

## 2021-06-22 PROCEDURE — 99214 PR OFFICE/OUTPT VISIT, EST, LEVL IV, 30-39 MIN: ICD-10-PCS | Mod: S$GLB,,, | Performed by: INTERNAL MEDICINE

## 2021-06-22 PROCEDURE — 1126F AMNT PAIN NOTED NONE PRSNT: CPT | Mod: S$GLB,,, | Performed by: INTERNAL MEDICINE

## 2021-06-22 PROCEDURE — 3008F PR BODY MASS INDEX (BMI) DOCUMENTED: ICD-10-PCS | Mod: CPTII,S$GLB,, | Performed by: INTERNAL MEDICINE

## 2021-06-22 PROCEDURE — 3288F FALL RISK ASSESSMENT DOCD: CPT | Mod: CPTII,S$GLB,, | Performed by: INTERNAL MEDICINE

## 2021-06-22 PROCEDURE — 1159F PR MEDICATION LIST DOCUMENTED IN MEDICAL RECORD: ICD-10-PCS | Mod: S$GLB,,, | Performed by: INTERNAL MEDICINE

## 2021-06-22 PROCEDURE — 1159F MED LIST DOCD IN RCRD: CPT | Mod: S$GLB,,, | Performed by: INTERNAL MEDICINE

## 2021-06-22 PROCEDURE — 99214 OFFICE O/P EST MOD 30 MIN: CPT | Mod: S$GLB,,, | Performed by: INTERNAL MEDICINE

## 2021-06-22 ASSESSMENT — ROUTINE ASSESSMENT OF PATIENT INDEX DATA (RAPID3)
PATIENT GLOBAL ASSESSMENT SCORE: 2.5
PSYCHOLOGICAL DISTRESS SCORE: 0
FATIGUE SCORE: 3
MDHAQ FUNCTION SCORE: 0.2
AM STIFFNESS SCORE: 0, NO
PAIN SCORE: 1
TOTAL RAPID3 SCORE: 1.39

## 2021-07-05 ENCOUNTER — PATIENT MESSAGE (OUTPATIENT)
Dept: RHEUMATOLOGY | Facility: CLINIC | Age: 66
End: 2021-07-05

## 2021-07-06 DIAGNOSIS — M05.79 SEROPOSITIVE RHEUMATOID ARTHRITIS OF MULTIPLE SITES: ICD-10-CM

## 2021-07-06 RX ORDER — LEFLUNOMIDE 20 MG/1
20 TABLET ORAL DAILY
Qty: 30 TABLET | Refills: 6 | Status: SHIPPED | OUTPATIENT
Start: 2021-07-06 | End: 2022-03-18

## 2021-08-03 ENCOUNTER — OFFICE VISIT (OUTPATIENT)
Dept: FAMILY MEDICINE | Facility: CLINIC | Age: 66
End: 2021-08-03
Payer: MEDICARE

## 2021-08-03 ENCOUNTER — LAB VISIT (OUTPATIENT)
Dept: LAB | Facility: HOSPITAL | Age: 66
End: 2021-08-03
Attending: FAMILY MEDICINE
Payer: MEDICARE

## 2021-08-03 VITALS
TEMPERATURE: 98 F | HEIGHT: 69 IN | WEIGHT: 226 LBS | DIASTOLIC BLOOD PRESSURE: 70 MMHG | SYSTOLIC BLOOD PRESSURE: 124 MMHG | BODY MASS INDEX: 33.47 KG/M2 | HEART RATE: 63 BPM | OXYGEN SATURATION: 97 %

## 2021-08-03 DIAGNOSIS — I10 GOOD HYPERTENSION CONTROL: ICD-10-CM

## 2021-08-03 DIAGNOSIS — M05.79 SEROPOSITIVE RHEUMATOID ARTHRITIS OF MULTIPLE SITES: ICD-10-CM

## 2021-08-03 DIAGNOSIS — E78.2 MIXED HYPERLIPIDEMIA: ICD-10-CM

## 2021-08-03 DIAGNOSIS — Z23 NEED FOR PROPHYLACTIC VACCINATION AGAINST STREPTOCOCCUS PNEUMONIAE (PNEUMOCOCCUS): Primary | ICD-10-CM

## 2021-08-03 DIAGNOSIS — Z23 NEED FOR PROPHYLACTIC VACCINATION AGAINST DIPHTHERIA AND TETANUS: ICD-10-CM

## 2021-08-03 LAB
BASOPHILS # BLD AUTO: 0.06 K/UL (ref 0–0.2)
BASOPHILS NFR BLD: 0.9 % (ref 0–1.9)
DIFFERENTIAL METHOD: ABNORMAL
EOSINOPHIL # BLD AUTO: 0.3 K/UL (ref 0–0.5)
EOSINOPHIL NFR BLD: 3.6 % (ref 0–8)
ERYTHROCYTE [DISTWIDTH] IN BLOOD BY AUTOMATED COUNT: 13.2 % (ref 11.5–14.5)
HCT VFR BLD AUTO: 40.5 % (ref 40–54)
HGB BLD-MCNC: 13.2 G/DL (ref 14–18)
IMM GRANULOCYTES # BLD AUTO: 0.01 K/UL (ref 0–0.04)
IMM GRANULOCYTES NFR BLD AUTO: 0.1 % (ref 0–0.5)
LYMPHOCYTES # BLD AUTO: 1.6 K/UL (ref 1–4.8)
LYMPHOCYTES NFR BLD: 23.1 % (ref 18–48)
MCH RBC QN AUTO: 29.1 PG (ref 27–31)
MCHC RBC AUTO-ENTMCNC: 32.6 G/DL (ref 32–36)
MCV RBC AUTO: 89 FL (ref 82–98)
MONOCYTES # BLD AUTO: 1 K/UL (ref 0.3–1)
MONOCYTES NFR BLD: 14.4 % (ref 4–15)
NEUTROPHILS # BLD AUTO: 4 K/UL (ref 1.8–7.7)
NEUTROPHILS NFR BLD: 57.9 % (ref 38–73)
NRBC BLD-RTO: 0 /100 WBC
PLATELET # BLD AUTO: 232 K/UL (ref 150–450)
PMV BLD AUTO: 11.2 FL (ref 9.2–12.9)
RBC # BLD AUTO: 4.53 M/UL (ref 4.6–6.2)
TSH SERPL DL<=0.005 MIU/L-ACNC: 2.42 UIU/ML (ref 0.4–4)
WBC # BLD AUTO: 6.96 K/UL (ref 3.9–12.7)

## 2021-08-03 PROCEDURE — 84443 ASSAY THYROID STIM HORMONE: CPT | Performed by: FAMILY MEDICINE

## 2021-08-03 PROCEDURE — 90732 PPSV23 VACC 2 YRS+ SUBQ/IM: CPT | Mod: S$GLB,,, | Performed by: FAMILY MEDICINE

## 2021-08-03 PROCEDURE — 99999 PR PBB SHADOW E&M-EST. PATIENT-LVL IV: CPT | Mod: PBBFAC,,, | Performed by: FAMILY MEDICINE

## 2021-08-03 PROCEDURE — 90472 TD VACCINE GREATER THAN OR EQUAL TO 7YO PRESERVATIVE FREE IM: ICD-10-PCS | Mod: S$GLB,,, | Performed by: FAMILY MEDICINE

## 2021-08-03 PROCEDURE — 3078F DIAST BP <80 MM HG: CPT | Mod: CPTII,S$GLB,, | Performed by: FAMILY MEDICINE

## 2021-08-03 PROCEDURE — 3288F FALL RISK ASSESSMENT DOCD: CPT | Mod: CPTII,S$GLB,, | Performed by: FAMILY MEDICINE

## 2021-08-03 PROCEDURE — 3074F SYST BP LT 130 MM HG: CPT | Mod: CPTII,S$GLB,, | Performed by: FAMILY MEDICINE

## 2021-08-03 PROCEDURE — 99214 PR OFFICE/OUTPT VISIT, EST, LEVL IV, 30-39 MIN: ICD-10-PCS | Mod: 25,S$GLB,, | Performed by: FAMILY MEDICINE

## 2021-08-03 PROCEDURE — 3288F PR FALLS RISK ASSESSMENT DOCUMENTED: ICD-10-PCS | Mod: CPTII,S$GLB,, | Performed by: FAMILY MEDICINE

## 2021-08-03 PROCEDURE — 99214 OFFICE O/P EST MOD 30 MIN: CPT | Mod: 25,S$GLB,, | Performed by: FAMILY MEDICINE

## 2021-08-03 PROCEDURE — 1126F PR PAIN SEVERITY QUANTIFIED, NO PAIN PRESENT: ICD-10-PCS | Mod: CPTII,S$GLB,, | Performed by: FAMILY MEDICINE

## 2021-08-03 PROCEDURE — 1160F RVW MEDS BY RX/DR IN RCRD: CPT | Mod: CPTII,S$GLB,, | Performed by: FAMILY MEDICINE

## 2021-08-03 PROCEDURE — 36415 COLL VENOUS BLD VENIPUNCTURE: CPT | Mod: PO | Performed by: FAMILY MEDICINE

## 2021-08-03 PROCEDURE — 1101F PT FALLS ASSESS-DOCD LE1/YR: CPT | Mod: CPTII,S$GLB,, | Performed by: FAMILY MEDICINE

## 2021-08-03 PROCEDURE — 90732 PNEUMOCOCCAL POLYSACCHARIDE VACCINE 23-VALENT =>2YO SQ IM: ICD-10-PCS | Mod: S$GLB,,, | Performed by: FAMILY MEDICINE

## 2021-08-03 PROCEDURE — 1101F PR PT FALLS ASSESS DOC 0-1 FALLS W/OUT INJ PAST YR: ICD-10-PCS | Mod: CPTII,S$GLB,, | Performed by: FAMILY MEDICINE

## 2021-08-03 PROCEDURE — 1160F PR REVIEW ALL MEDS BY PRESCRIBER/CLIN PHARMACIST DOCUMENTED: ICD-10-PCS | Mod: CPTII,S$GLB,, | Performed by: FAMILY MEDICINE

## 2021-08-03 PROCEDURE — 1159F MED LIST DOCD IN RCRD: CPT | Mod: CPTII,S$GLB,, | Performed by: FAMILY MEDICINE

## 2021-08-03 PROCEDURE — 90714 TD VACCINE GREATER THAN OR EQUAL TO 7YO PRESERVATIVE FREE IM: ICD-10-PCS | Mod: S$GLB,,, | Performed by: FAMILY MEDICINE

## 2021-08-03 PROCEDURE — 85025 COMPLETE CBC W/AUTO DIFF WBC: CPT | Performed by: FAMILY MEDICINE

## 2021-08-03 PROCEDURE — G0009 PNEUMOCOCCAL POLYSACCHARIDE VACCINE 23-VALENT =>2YO SQ IM: ICD-10-PCS | Mod: S$GLB,,, | Performed by: FAMILY MEDICINE

## 2021-08-03 PROCEDURE — G0009 ADMIN PNEUMOCOCCAL VACCINE: HCPCS | Mod: S$GLB,,, | Performed by: FAMILY MEDICINE

## 2021-08-03 PROCEDURE — 99999 PR PBB SHADOW E&M-EST. PATIENT-LVL IV: ICD-10-PCS | Mod: PBBFAC,,, | Performed by: FAMILY MEDICINE

## 2021-08-03 PROCEDURE — 3078F PR MOST RECENT DIASTOLIC BLOOD PRESSURE < 80 MM HG: ICD-10-PCS | Mod: CPTII,S$GLB,, | Performed by: FAMILY MEDICINE

## 2021-08-03 PROCEDURE — 1159F PR MEDICATION LIST DOCUMENTED IN MEDICAL RECORD: ICD-10-PCS | Mod: CPTII,S$GLB,, | Performed by: FAMILY MEDICINE

## 2021-08-03 PROCEDURE — 3008F PR BODY MASS INDEX (BMI) DOCUMENTED: ICD-10-PCS | Mod: CPTII,S$GLB,, | Performed by: FAMILY MEDICINE

## 2021-08-03 PROCEDURE — 90714 TD VACC NO PRESV 7 YRS+ IM: CPT | Mod: S$GLB,,, | Performed by: FAMILY MEDICINE

## 2021-08-03 PROCEDURE — 3074F PR MOST RECENT SYSTOLIC BLOOD PRESSURE < 130 MM HG: ICD-10-PCS | Mod: CPTII,S$GLB,, | Performed by: FAMILY MEDICINE

## 2021-08-03 PROCEDURE — 90472 IMMUNIZATION ADMIN EACH ADD: CPT | Mod: S$GLB,,, | Performed by: FAMILY MEDICINE

## 2021-08-03 PROCEDURE — 3008F BODY MASS INDEX DOCD: CPT | Mod: CPTII,S$GLB,, | Performed by: FAMILY MEDICINE

## 2021-08-03 PROCEDURE — 1126F AMNT PAIN NOTED NONE PRSNT: CPT | Mod: CPTII,S$GLB,, | Performed by: FAMILY MEDICINE

## 2021-08-03 RX ORDER — PYRIDOXINE HCL (VITAMIN B6) 25 MG
25 TABLET ORAL DAILY
COMMUNITY

## 2021-08-03 RX ORDER — LANOLIN ALCOHOL/MO/W.PET/CERES
100 CREAM (GRAM) TOPICAL DAILY
COMMUNITY

## 2021-08-03 RX ORDER — ROSUVASTATIN CALCIUM 10 MG/1
10 TABLET, COATED ORAL DAILY
Qty: 90 TABLET | Refills: 3 | Status: SHIPPED | OUTPATIENT
Start: 2021-08-03 | End: 2022-08-26

## 2021-08-04 ENCOUNTER — TELEPHONE (OUTPATIENT)
Dept: FAMILY MEDICINE | Facility: CLINIC | Age: 66
End: 2021-08-04

## 2021-08-13 RX ORDER — OMEPRAZOLE 20 MG/1
CAPSULE, DELAYED RELEASE ORAL
Qty: 90 CAPSULE | Refills: 1 | Status: SHIPPED | OUTPATIENT
Start: 2021-08-13 | End: 2022-02-15

## 2022-01-05 ENCOUNTER — PATIENT OUTREACH (OUTPATIENT)
Dept: ADMINISTRATIVE | Facility: OTHER | Age: 67
End: 2022-01-05
Payer: MEDICARE

## 2022-01-05 NOTE — PROGRESS NOTES
Chart was reviewed for overdue Proactive [facility] Encounters (DAMIEN)  topics  Updates were requested from care everywhere  Health Maintenance has been updated  [place] immunization registry triggered

## 2022-01-11 ENCOUNTER — OFFICE VISIT (OUTPATIENT)
Dept: UROLOGY | Facility: CLINIC | Age: 67
End: 2022-01-11
Payer: MEDICARE

## 2022-01-11 VITALS
DIASTOLIC BLOOD PRESSURE: 79 MMHG | BODY MASS INDEX: 33.11 KG/M2 | RESPIRATION RATE: 18 BRPM | HEIGHT: 69 IN | SYSTOLIC BLOOD PRESSURE: 139 MMHG | HEART RATE: 68 BPM | WEIGHT: 223.56 LBS

## 2022-01-11 DIAGNOSIS — N13.8 BPH WITH OBSTRUCTION/LOWER URINARY TRACT SYMPTOMS: Primary | ICD-10-CM

## 2022-01-11 DIAGNOSIS — Z12.5 PROSTATE CANCER SCREENING: ICD-10-CM

## 2022-01-11 DIAGNOSIS — N40.1 BPH WITH OBSTRUCTION/LOWER URINARY TRACT SYMPTOMS: Primary | ICD-10-CM

## 2022-01-11 PROCEDURE — 3075F SYST BP GE 130 - 139MM HG: CPT | Mod: CPTII,S$GLB,, | Performed by: NURSE PRACTITIONER

## 2022-01-11 PROCEDURE — 3075F PR MOST RECENT SYSTOLIC BLOOD PRESS GE 130-139MM HG: ICD-10-PCS | Mod: CPTII,S$GLB,, | Performed by: NURSE PRACTITIONER

## 2022-01-11 PROCEDURE — 99213 OFFICE O/P EST LOW 20 MIN: CPT | Mod: S$GLB,,, | Performed by: NURSE PRACTITIONER

## 2022-01-11 PROCEDURE — 1160F RVW MEDS BY RX/DR IN RCRD: CPT | Mod: CPTII,S$GLB,, | Performed by: NURSE PRACTITIONER

## 2022-01-11 PROCEDURE — 1101F PR PT FALLS ASSESS DOC 0-1 FALLS W/OUT INJ PAST YR: ICD-10-PCS | Mod: CPTII,S$GLB,, | Performed by: NURSE PRACTITIONER

## 2022-01-11 PROCEDURE — 99213 PR OFFICE/OUTPT VISIT, EST, LEVL III, 20-29 MIN: ICD-10-PCS | Mod: S$GLB,,, | Performed by: NURSE PRACTITIONER

## 2022-01-11 PROCEDURE — 3078F DIAST BP <80 MM HG: CPT | Mod: CPTII,S$GLB,, | Performed by: NURSE PRACTITIONER

## 2022-01-11 PROCEDURE — 1159F PR MEDICATION LIST DOCUMENTED IN MEDICAL RECORD: ICD-10-PCS | Mod: CPTII,S$GLB,, | Performed by: NURSE PRACTITIONER

## 2022-01-11 PROCEDURE — 1126F PR PAIN SEVERITY QUANTIFIED, NO PAIN PRESENT: ICD-10-PCS | Mod: CPTII,S$GLB,, | Performed by: NURSE PRACTITIONER

## 2022-01-11 PROCEDURE — 3008F BODY MASS INDEX DOCD: CPT | Mod: CPTII,S$GLB,, | Performed by: NURSE PRACTITIONER

## 2022-01-11 PROCEDURE — 3288F FALL RISK ASSESSMENT DOCD: CPT | Mod: CPTII,S$GLB,, | Performed by: NURSE PRACTITIONER

## 2022-01-11 PROCEDURE — 1160F PR REVIEW ALL MEDS BY PRESCRIBER/CLIN PHARMACIST DOCUMENTED: ICD-10-PCS | Mod: CPTII,S$GLB,, | Performed by: NURSE PRACTITIONER

## 2022-01-11 PROCEDURE — 1126F AMNT PAIN NOTED NONE PRSNT: CPT | Mod: CPTII,S$GLB,, | Performed by: NURSE PRACTITIONER

## 2022-01-11 PROCEDURE — 3078F PR MOST RECENT DIASTOLIC BLOOD PRESSURE < 80 MM HG: ICD-10-PCS | Mod: CPTII,S$GLB,, | Performed by: NURSE PRACTITIONER

## 2022-01-11 PROCEDURE — 1101F PT FALLS ASSESS-DOCD LE1/YR: CPT | Mod: CPTII,S$GLB,, | Performed by: NURSE PRACTITIONER

## 2022-01-11 PROCEDURE — 99999 PR PBB SHADOW E&M-EST. PATIENT-LVL IV: ICD-10-PCS | Mod: PBBFAC,,, | Performed by: NURSE PRACTITIONER

## 2022-01-11 PROCEDURE — 1159F MED LIST DOCD IN RCRD: CPT | Mod: CPTII,S$GLB,, | Performed by: NURSE PRACTITIONER

## 2022-01-11 PROCEDURE — 99999 PR PBB SHADOW E&M-EST. PATIENT-LVL IV: CPT | Mod: PBBFAC,,, | Performed by: NURSE PRACTITIONER

## 2022-01-11 PROCEDURE — 3008F PR BODY MASS INDEX (BMI) DOCUMENTED: ICD-10-PCS | Mod: CPTII,S$GLB,, | Performed by: NURSE PRACTITIONER

## 2022-01-11 PROCEDURE — 3288F PR FALLS RISK ASSESSMENT DOCUMENTED: ICD-10-PCS | Mod: CPTII,S$GLB,, | Performed by: NURSE PRACTITIONER

## 2022-01-11 NOTE — PATIENT INSTRUCTIONS
Patient Education       Benign Prostatic Hyperplasia (Enlarged Prostate) Discharge Instructions   About this topic   The prostate is a part of your body that helps make semen and is found at the base of the penis and in front of the rectum. In benign prostatic hyperplasia, or BPH, the prostate grows bigger. Your prostate often gets larger as you get older. The flow of urine may be blocked as the prostate gets larger. BPH is not cancer.     If your prostate is large, you may have trouble passing urine. You may not be able to empty your bladder all the way. You may pass smaller amounts of urine, go more often, or wake at night to pass urine. Some people have trouble starting to pass urine or may leak or dribble urine after they stop.  Your treatment may include:  · Waiting to see if the signs get better or get worse. You may not need any treatment if your prostate does not bother you very much.  · Placing a tube, called a catheter, through your penis into your bladder. The catheter may help drain your urine if you are not able to pass urine on your own.  · Taking drugs to help relax the muscles or stop the prostate from growing larger.  · Having a procedure to shrink the size of the prostate using heat or ultrasound.  · Surgery to remove part or all of the prostate.  What care is needed at home?   · Ask your doctor what you need to do when you go home. Make sure you ask questions if you do not understand what the doctor says you need to do.  · Your doctor may put a tube called a catheter into your penis to help drain urine. The tube will need to stay in for 7 to 10 days. You will be taught about how to care for the tube. Care may include:  ? Attach the tube to your leg using a special mukherjee or straps. This will help keep the tube from getting pulled out.  ? Keep the tubing free of kinks or bends. Then, urine can drain easily.  ? Keep your urine bag below the level of your hips. Then urine will not flow back into your  body.  ? Empty your urine bag when 2/3 full. Be sure to wash your hands before and after you empty the bag. Your doctor may want you to measure and write down how much urine you empty from the bag.  ? Clean your penis using soap and water two times each day. Rinse with plain water and gently pat dry to prevent infection.  ? Ask your doctor if you are able to take a shower while you have the tube in place.  ? Your doctor will be able to remove the tube in the office.  ? Do not soak in the tub, a pool, or other body of water with the catheter in place.  What follow-up care is needed?   · Your doctor may ask you to make visits to the office to check on your progress. Be sure to keep your visits.  · Your doctor will tell you if you need to see a urologist, which is a doctor with special training to treat problems with urine flow and prostate problems.  · If your signs get worse, your doctor may suggest procedures to reduce the size of your prostate.  What drugs may be needed?   The doctor may order drugs to:  · Control your prostate enlargement  · Help with pain  · Prevent infection  Will physical activity be limited?   If you have a catheter in, you may need to limit lifting and some activities until the catheter is removed. Otherwise, you should be able to return to your normal activities.  What changes to diet are needed?   Eat a healthy diet with whole grains, fruits, and vegetables. Drink at least 8 glasses of fluid each day to prevent hard stools. If you have hard stools, you tend to strain and that can put more pressure on the prostate. Ask your doctor if you can take a laxative if you have hard stools.  What problems could happen?   · Urinary tract infections  · Problems with erections or discharge of semen from the penis during climax  · Bleeding  What can be done to prevent this health problem?   Your prostate gets bigger as you get older. You cannot stop the prostate from getting bigger. Doing the following  things may help with the signs of BPH:  · Pass urine when you first feel the urge.  · Do not stop your flow.  · Avoid beer, wine, and mixed drinks (alcohol). Avoid caffeine. The listed drinks can make you go more often and bother your urinary tract.  · Avoid or drink less fluids with caffeine that can make you go more often and bother your urinary tract.  · Stop drinking fluids a few hours before bedtime or going out.  · After you empty your bladder, wait a moment and try to go again (double voiding). Do not strain or push to empty your bladder.  · Have a physical exam each year. If you are 40 years old or older, you should have your prostate checked every year.  When do I need to call the doctor?   Call your doctor if you have a catheter in place and you have:  · Signs of infection, such as fever of 100.4°F (38°C) or higher, chills.  · Itching and redness in the area where your catheter was put in  · Lots of blood in your urine  · Cloudy colored urine or pus in urine  · Little or no urine is draining  · Accidentally pulled out your catheter  · Upset stomach, throwing up, or very bad belly pain  · Pain in your lower back on one side or both sides, over the kidneys  · Pain not helped by drugs ordered  · You are not feeling better in 2 to 3 days or you are feeling worse  If you do not have a catheter, be sure to call your doctor if your signs get worse or if you have:  · Signs of urine infection such as a fever of 100.4°F (38°C) or higher, chills, burning or pain when you pass urine  · No urine or more problems passing urine  · You are not feeling better in 2 to 3 days or you are feeling worse  Teach Back: Helping You Understand   The Teach Back Method helps you understand the information we are giving you. After you talk with the staff, tell them in your own words what you learned. This helps to make sure the staff has described each thing clearly. It also helps to explain things that may have been confusing. Before  going home, make sure you can do these:  · I can tell you about my condition.  · I can tell you how to care for my drainage tube, if I have one.  · I can tell you what I will do if I have cloudy urine or little or no urine coming out.  Where can I learn more?   American Academy of Family Physicians  https://familydoctor.org/condition/benign-prostatic-hyperplasia-bph/   American U[employer] Association Foundation  https://www.urologyhealth.org/urologic-conditions/benign-prostatic-hyperplasia-(bph)   NHS Choices  https://www.nhs.uk/conditions/prostate-enlargement/   Last Reviewed Date   2021-03-30  Consumer Information Use and Disclaimer   This information is not specific medical advice and does not replace information you receive from your health care provider. This is only a brief summary of general information. It does NOT include all information about conditions, illnesses, injuries, tests, procedures, treatments, therapies, discharge instructions or life-style choices that may apply to you. You must talk with your health care provider for complete information about your health and treatment options. This information should not be used to decide whether or not to accept your health care providers advice, instructions or recommendations. Only your health care provider has the knowledge and training to provide advice that is right for you.  Copyright   Copyright © 2021 UpToDate, Inc. and its affiliates and/or licensors. All rights reserved.  Patient Education       Prostate Cancer Screening (PSA Tests)   The Basics   Written by the doctors and editors at [employer]   What is prostate cancer screening? -- Prostate cancer screening is a way in which doctors check the prostate gland for signs of cancer. The prostate gland is part of the male anatomy. It sits below the bladder and in front of the rectum. It forms a ring around the urethra, the tube that carries urine out of the body (figure 1).  The main test used to  "screen for prostate cancer is a blood test called a "PSA test." Some people also have an exam called a rectal exam (figure 2).   Who should be screened for prostate cancer? -- Prostate cancer screening is done in people who have no symptoms of the disease. It is not clear whether getting screened for prostate cancer can extend life or prevent symptoms or problems. For this reason, doctors do not know who - if anyone - should be screened for prostate cancer.  Most experts recommend working with your doctor to decide whether screening is right for you. In most cases, this discussion should start around the age of 50. If you have any risk factors for prostate cancer, you might want to begin screening at age 40 to 45. Your risk is higher if you are black, have a father or brother with prostate cancer, or have certain genetic mutations such as "BRCA1" or "BRCA2."  Most doctors recommend against screening if you are age 70 or older, or have serious health problems.  Why do doctors offer screening? -- Doctors offer screening in the hopes of catching prostate cancer early - before it has a chance to grow, spread, or cause symptoms. With many cancers, catching the disease early is an important part of effective treatment. But prostate cancer is not like many other cancers. It usually grows slowly and does not usually lead to death. The problem is that a small number of prostate cancers are serious and can lead to death. Doctors do not have an ideal way to tell which prostate cancers are deadly and which ones would never cause any problems.  Certain tests can suggest which prostate cancers might be more likely to cause problems. But the tests are far from perfect. Also, different studies have come to different conclusions about the benefits of screening and whether or not it lowers the risk of dying from prostate cancer.  What are the drawbacks to getting screened? -- PSA tests have 2 main drawbacks:  · PSA tests sometimes " "give "false positives." This means they suggest cancer when there is actually no cancer. This can lead to unneeded worry and to further tests. One of these tests, a biopsy, can be briefly painful.  · When PSA tests lead to the discovery of cancer, there is very often no way to tell whether the cancer is one that could do harm. That means you could get treated for cancer that would never have done you any harm. That's a problem because treatment for prostate cancer has risks and often causes problems of its own. For instance, prostate cancer treatment can cause you to leak urine and to have problems with sex.  How do I decide if I should be screened? -- Work with your doctor or nurse to decide if screening is right for you. This will involve thinking about how likely it is that you will get prostate cancer. If you have a high risk of prostate cancer, screening might be a good idea.  You will also need to think about how you feel about the possible benefits and harms of being screened. Ask yourself:  · Do I want to know if I have prostate cancer, even if the cancer might never do me any harm?  · Would I be treated if I learned that I had prostate cancer?  · How do I feel about the risks of being treated for prostate cancer?  · How do I feel about the risks of getting a deadly or aggressive form of prostate cancer?  · Would I be willing to accept a high risk of side effects from treatment in return for a small chance of living longer?  What is a PSA test? -- PSA stands for "prostate-specific antigen." PSA is a protein made by the prostate. Levels of this protein usually go up when a person has prostate cancer. The protein also goes up for reasons that do not involve cancer. For example, PSA levels rise when a man:  · Has a condition called benign prostatic hyperplasia (BPH), sometimes called an enlarged prostate  · Has a prostate infection, also called prostatitis  · Hurts his prostate, for example while riding a " bike  · Ejaculates (has an orgasm)  What if my PSA level is too high? -- If your PSA level is high, try not to panic. It's possible your PSA is high for reasons unrelated to cancer. If your PSA level is only somewhat high, often the next step is to have the PSA test again. For 2 days before the second test, avoid ejaculating and bike-riding. If your doctor thinks you have a prostate infection, you might also need to take antibiotics for a while before you repeat the test.  If your PSA is still high on the second test, or if it was very high the first time, you will probably need more testing. This might include a biopsy or other test. A biopsy means that a doctor will insert a needle into your prostate to take tiny samples of tissue. Those samples will then go to the lab to be checked for cancer.  If it turns out you do have cancer, remember that prostate cancer is not usually deadly. It usually grows slowly, so you probably have time to decide what to do. There are treatments that can sometimes cure prostate cancer. You might also choose to hold off on treatment and wait to see if your cancer shows signs of progressing.  How often should I be screened for prostate cancer? -- If you do decide to be screened, experts recommend repeating screening every 2 to 4 years.  Doctors recommend stopping screening when you turn 70 or if you develop serious health problems. In these cases, the benefits of screening are not worth the possible harms.  All topics are updated as new evidence becomes available and our peer review process is complete.  This topic retrieved from [employer] on: Sep 21, 2021.  Topic 27045 Version 13.0  Release: 29.4.2 - C29.263  © 2021 UpToDate, Inc. and/or its affiliates. All rights reserved.  figure 1: Prostate gland     This drawing shows male internal organs and a close-up of the prostate gland.  Graphic 67014 Version 5.0    figure 2: Rectal exam     During a rectal exam, the doctor or nurse puts a  finger inside your rectum and feels your prostate gland. That way he or she can see how big it is and whether it has bumps or dents or anything unusual.  Graphic 71556 Version 5.0    Consumer Information Use and Disclaimer   This information is not specific medical advice and does not replace information you receive from your health care provider. This is only a brief summary of general information. It does NOT include all information about conditions, illnesses, injuries, tests, procedures, treatments, therapies, discharge instructions or life-style choices that may apply to you. You must talk with your health care provider for complete information about your health and treatment options. This information should not be used to decide whether or not to accept your health care provider's advice, instructions or recommendations. Only your health care provider has the knowledge and training to provide advice that is right for you. The use of this information is governed by the [employer] End User License Agreement, available at https://www.[employer]/en/solutions/[employer]/about/delia.The use of [employer] content is governed by the [employer] Terms of Use. ©2021 UpToDate, Inc. All rights reserved.  Copyright   © 2021 UpToDate, Inc. and/or its affiliates. All rights reserved.

## 2022-01-11 NOTE — PROGRESS NOTES
[facility] Note  Staff: CAROLE Barone-C    PCP: MD Mulugeta  Urologist:  MD Farhan    Chief Complaint: Annual Prostate Exam    Subjective:        HPI: Jaswinder Cifuentes is a 66 y.o. male who presents for with PMHx of HTN, HLD, AP, DM, AS, RA, who presents for BPH follow up.  Pt was last evaluated by Dr. Real on 21.       HX:  Started flomax in  with reduction in AUSS from 31/ to 7/2 with less urgency/frequency and less nocturia in 2019 pvr 0, then fu with NP in  AUA SS 5/3, pvr 46. Continued flomax     Since last visit visit, has been told by cardiology Dr Olson he may need AV replacement.  Dunn murmurs on exam with pcp, and seen on echo  Otherwise urinating well, stable on flomax.   AUA SS: 5/2 (3: sleeping, 2 frequency)     Last PSA Screening:   Lab Results   Component Value Date    PSA 2.8 2021     TODAY:  No urine collected in office.  Pt denies gross hematuria, dysuria at this time.    AUA SS Today:  17/3  Feeling of ICBE:2  Frequency:3  Intermittency:2  Urgency:3  Weak urine stream:2  Strainin  Nocturia:4 (only while drinking during holidays), since stopping alcohol only once nightly    REVIEW OF SYSTEMS:  A comprehensive 10 system review was performed and is negative except as noted above in HPI    PMHx:  Past Medical History:   Diagnosis Date    Aortic stenosis     BPH with obstruction/lower urinary tract symptoms     Diabetes mellitus, type 2     Hyperlipidemia     Hypertension     Seropositive rheumatoid arthritis      PSHx:  Past Surgical History:   Procedure Laterality Date    ANGIOGRAM, CORONARY, WITH LEFT HEART CATHETERIZATION  2021     Allergies:  Patient has no known allergies.    Medications: reviewed     Objective:     Vitals:    22 1015   BP: 139/79   Pulse: 68   Resp: 18     General:WDWN in NAD  Eyes: PERRLA, normal conjunctiva  Respiratory: no increased work on breathing, clear to auscultation  Cardiovascular:  regular rate and rhythm. No obvious extremity edema.  GI: palpation of masses. No tenderness. No hepatosplenomegaly to palpation.  Musculoskeletal: normal range of motion of bilateral upper extremities. Normal muscle strength and tone.  Skin: no obvious rashes or lesions. No tightening of skin noted.  Neurologic: CN grossly normal. Normal sensation.   Psychiatric: awake, alert and oriented x 3. Mood and affect normal. Cooperative.     Exam performed by me today:  Inspection of anus and perineum normal  CY: 25-30g smooth prostate gland without nodules, masses, tenderness. SV not palpable. Normal sphincter tone.   +Outer hemhorroids.  No bilateral inguinal hernias noted     Assessment:       1. BPH with obstruction/lower urinary tract symptoms    2. Prostate cancer screening          Plan:     PSA test to be done within next month with other PCP office labs.  Continue Flomax 0.4 mg one capsule daily as previously prescribed.    F/u in one year.    [facility]: Active    Laure Santizo, FNP-C

## 2022-01-27 ENCOUNTER — PATIENT MESSAGE (OUTPATIENT)
Dept: RHEUMATOLOGY | Facility: CLINIC | Age: 67
End: 2022-01-27
Payer: MEDICARE

## 2022-02-01 ENCOUNTER — LAB VISIT (OUTPATIENT)
Dept: LAB | Facility: HOSPITAL | Age: 67
End: 2022-02-01
Attending: FAMILY MEDICINE
Payer: MEDICARE

## 2022-02-01 DIAGNOSIS — Z12.5 PROSTATE CANCER SCREENING: ICD-10-CM

## 2022-02-01 DIAGNOSIS — M05.79 SEROPOSITIVE RHEUMATOID ARTHRITIS OF MULTIPLE SITES: ICD-10-CM

## 2022-02-01 DIAGNOSIS — Z79.899 LONG-TERM USE OF HIGH-RISK MEDICATION: ICD-10-CM

## 2022-02-01 DIAGNOSIS — E78.2 MIXED HYPERLIPIDEMIA: ICD-10-CM

## 2022-02-01 LAB
ALBUMIN SERPL BCP-MCNC: 3.6 G/DL (ref 3.5–5.2)
ALP SERPL-CCNC: 85 U/L (ref 55–135)
ALT SERPL W/O P-5'-P-CCNC: 17 U/L (ref 10–44)
ANION GAP SERPL CALC-SCNC: 11 MMOL/L (ref 8–16)
AST SERPL-CCNC: 21 U/L (ref 10–40)
BASOPHILS # BLD AUTO: 0.04 K/UL (ref 0–0.2)
BASOPHILS NFR BLD: 0.5 % (ref 0–1.9)
BILIRUB SERPL-MCNC: 0.5 MG/DL (ref 0.1–1)
BUN SERPL-MCNC: 15 MG/DL (ref 8–23)
CALCIUM SERPL-MCNC: 9.7 MG/DL (ref 8.7–10.5)
CHLORIDE SERPL-SCNC: 104 MMOL/L (ref 95–110)
CHOLEST SERPL-MCNC: 144 MG/DL (ref 120–199)
CHOLEST/HDLC SERPL: 4.4 {RATIO} (ref 2–5)
CO2 SERPL-SCNC: 24 MMOL/L (ref 23–29)
COMPLEXED PSA SERPL-MCNC: 4.5 NG/ML (ref 0–4)
CREAT SERPL-MCNC: 1 MG/DL (ref 0.5–1.4)
CRP SERPL-MCNC: 11.5 MG/L (ref 0–8.2)
DIFFERENTIAL METHOD: ABNORMAL
EOSINOPHIL # BLD AUTO: 0.2 K/UL (ref 0–0.5)
EOSINOPHIL NFR BLD: 2.3 % (ref 0–8)
ERYTHROCYTE [DISTWIDTH] IN BLOOD BY AUTOMATED COUNT: 13.2 % (ref 11.5–14.5)
ERYTHROCYTE [SEDIMENTATION RATE] IN BLOOD BY WESTERGREN METHOD: 59 MM/HR (ref 0–23)
EST. GFR  (AFRICAN AMERICAN): >60 ML/MIN/1.73 M^2
EST. GFR  (NON AFRICAN AMERICAN): >60 ML/MIN/1.73 M^2
GLUCOSE SERPL-MCNC: 98 MG/DL (ref 70–110)
HCT VFR BLD AUTO: 40.8 % (ref 40–54)
HDLC SERPL-MCNC: 33 MG/DL (ref 40–75)
HDLC SERPL: 22.9 % (ref 20–50)
HGB BLD-MCNC: 13.3 G/DL (ref 14–18)
IMM GRANULOCYTES # BLD AUTO: 0.02 K/UL (ref 0–0.04)
IMM GRANULOCYTES NFR BLD AUTO: 0.3 % (ref 0–0.5)
LDLC SERPL CALC-MCNC: 84.8 MG/DL (ref 63–159)
LYMPHOCYTES # BLD AUTO: 1.1 K/UL (ref 1–4.8)
LYMPHOCYTES NFR BLD: 14.2 % (ref 18–48)
MCH RBC QN AUTO: 29.3 PG (ref 27–31)
MCHC RBC AUTO-ENTMCNC: 32.6 G/DL (ref 32–36)
MCV RBC AUTO: 90 FL (ref 82–98)
MONOCYTES # BLD AUTO: 1 K/UL (ref 0.3–1)
MONOCYTES NFR BLD: 12.4 % (ref 4–15)
NEUTROPHILS # BLD AUTO: 5.5 K/UL (ref 1.8–7.7)
NEUTROPHILS NFR BLD: 70.3 % (ref 38–73)
NONHDLC SERPL-MCNC: 111 MG/DL
NRBC BLD-RTO: 0 /100 WBC
PLATELET # BLD AUTO: 213 K/UL (ref 150–450)
PMV BLD AUTO: 11.4 FL (ref 9.2–12.9)
POTASSIUM SERPL-SCNC: 4.8 MMOL/L (ref 3.5–5.1)
PROT SERPL-MCNC: 7.5 G/DL (ref 6–8.4)
RBC # BLD AUTO: 4.54 M/UL (ref 4.6–6.2)
SODIUM SERPL-SCNC: 139 MMOL/L (ref 136–145)
TRIGL SERPL-MCNC: 131 MG/DL (ref 30–150)
WBC # BLD AUTO: 7.82 K/UL (ref 3.9–12.7)

## 2022-02-01 PROCEDURE — 85652 RBC SED RATE AUTOMATED: CPT | Performed by: INTERNAL MEDICINE

## 2022-02-01 PROCEDURE — 86140 C-REACTIVE PROTEIN: CPT | Performed by: INTERNAL MEDICINE

## 2022-02-01 PROCEDURE — 80061 LIPID PANEL: CPT | Performed by: FAMILY MEDICINE

## 2022-02-01 PROCEDURE — 85025 COMPLETE CBC W/AUTO DIFF WBC: CPT | Performed by: INTERNAL MEDICINE

## 2022-02-01 PROCEDURE — 80053 COMPREHEN METABOLIC PANEL: CPT | Performed by: INTERNAL MEDICINE

## 2022-02-01 PROCEDURE — 84153 ASSAY OF PSA TOTAL: CPT | Performed by: UROLOGY

## 2022-02-07 DIAGNOSIS — R97.20 ELEVATED PSA: Primary | ICD-10-CM

## 2022-02-08 ENCOUNTER — OFFICE VISIT (OUTPATIENT)
Dept: FAMILY MEDICINE | Facility: CLINIC | Age: 67
End: 2022-02-08
Payer: MEDICARE

## 2022-02-08 VITALS
HEART RATE: 80 BPM | DIASTOLIC BLOOD PRESSURE: 78 MMHG | BODY MASS INDEX: 32.88 KG/M2 | HEIGHT: 69 IN | WEIGHT: 222 LBS | OXYGEN SATURATION: 96 % | SYSTOLIC BLOOD PRESSURE: 130 MMHG

## 2022-02-08 DIAGNOSIS — E78.2 MIXED HYPERLIPIDEMIA: ICD-10-CM

## 2022-02-08 DIAGNOSIS — R09.82 POST-NASAL DRIP: ICD-10-CM

## 2022-02-08 DIAGNOSIS — M05.79 SEROPOSITIVE RHEUMATOID ARTHRITIS OF MULTIPLE SITES: ICD-10-CM

## 2022-02-08 DIAGNOSIS — R01.1 HEART MURMUR: ICD-10-CM

## 2022-02-08 DIAGNOSIS — N40.0 BENIGN PROSTATIC HYPERPLASIA WITHOUT LOWER URINARY TRACT SYMPTOMS: ICD-10-CM

## 2022-02-08 DIAGNOSIS — I10 GOOD HYPERTENSION CONTROL: Primary | ICD-10-CM

## 2022-02-08 DIAGNOSIS — R73.03 PRE-DIABETES: ICD-10-CM

## 2022-02-08 PROCEDURE — 3288F PR FALLS RISK ASSESSMENT DOCUMENTED: ICD-10-PCS | Mod: CPTII,S$GLB,, | Performed by: FAMILY MEDICINE

## 2022-02-08 PROCEDURE — 1126F PR PAIN SEVERITY QUANTIFIED, NO PAIN PRESENT: ICD-10-PCS | Mod: CPTII,S$GLB,, | Performed by: FAMILY MEDICINE

## 2022-02-08 PROCEDURE — 1101F PR PT FALLS ASSESS DOC 0-1 FALLS W/OUT INJ PAST YR: ICD-10-PCS | Mod: CPTII,S$GLB,, | Performed by: FAMILY MEDICINE

## 2022-02-08 PROCEDURE — 99999 PR PBB SHADOW E&M-EST. PATIENT-LVL IV: CPT | Mod: PBBFAC,,, | Performed by: FAMILY MEDICINE

## 2022-02-08 PROCEDURE — 3078F DIAST BP <80 MM HG: CPT | Mod: CPTII,S$GLB,, | Performed by: FAMILY MEDICINE

## 2022-02-08 PROCEDURE — 3075F SYST BP GE 130 - 139MM HG: CPT | Mod: CPTII,S$GLB,, | Performed by: FAMILY MEDICINE

## 2022-02-08 PROCEDURE — 3075F PR MOST RECENT SYSTOLIC BLOOD PRESS GE 130-139MM HG: ICD-10-PCS | Mod: CPTII,S$GLB,, | Performed by: FAMILY MEDICINE

## 2022-02-08 PROCEDURE — 1126F AMNT PAIN NOTED NONE PRSNT: CPT | Mod: CPTII,S$GLB,, | Performed by: FAMILY MEDICINE

## 2022-02-08 PROCEDURE — 3008F BODY MASS INDEX DOCD: CPT | Mod: CPTII,S$GLB,, | Performed by: FAMILY MEDICINE

## 2022-02-08 PROCEDURE — 3008F PR BODY MASS INDEX (BMI) DOCUMENTED: ICD-10-PCS | Mod: CPTII,S$GLB,, | Performed by: FAMILY MEDICINE

## 2022-02-08 PROCEDURE — 4010F ACE/ARB THERAPY RXD/TAKEN: CPT | Mod: CPTII,S$GLB,, | Performed by: FAMILY MEDICINE

## 2022-02-08 PROCEDURE — 99213 OFFICE O/P EST LOW 20 MIN: CPT | Mod: S$GLB,,, | Performed by: FAMILY MEDICINE

## 2022-02-08 PROCEDURE — 4010F PR ACE/ARB THEARPY RXD/TAKEN: ICD-10-PCS | Mod: CPTII,S$GLB,, | Performed by: FAMILY MEDICINE

## 2022-02-08 PROCEDURE — 1159F PR MEDICATION LIST DOCUMENTED IN MEDICAL RECORD: ICD-10-PCS | Mod: CPTII,S$GLB,, | Performed by: FAMILY MEDICINE

## 2022-02-08 PROCEDURE — 99213 PR OFFICE/OUTPT VISIT, EST, LEVL III, 20-29 MIN: ICD-10-PCS | Mod: S$GLB,,, | Performed by: FAMILY MEDICINE

## 2022-02-08 PROCEDURE — 1160F RVW MEDS BY RX/DR IN RCRD: CPT | Mod: CPTII,S$GLB,, | Performed by: FAMILY MEDICINE

## 2022-02-08 PROCEDURE — 1101F PT FALLS ASSESS-DOCD LE1/YR: CPT | Mod: CPTII,S$GLB,, | Performed by: FAMILY MEDICINE

## 2022-02-08 PROCEDURE — 3288F FALL RISK ASSESSMENT DOCD: CPT | Mod: CPTII,S$GLB,, | Performed by: FAMILY MEDICINE

## 2022-02-08 PROCEDURE — 1160F PR REVIEW ALL MEDS BY PRESCRIBER/CLIN PHARMACIST DOCUMENTED: ICD-10-PCS | Mod: CPTII,S$GLB,, | Performed by: FAMILY MEDICINE

## 2022-02-08 PROCEDURE — 3078F PR MOST RECENT DIASTOLIC BLOOD PRESSURE < 80 MM HG: ICD-10-PCS | Mod: CPTII,S$GLB,, | Performed by: FAMILY MEDICINE

## 2022-02-08 PROCEDURE — 1159F MED LIST DOCD IN RCRD: CPT | Mod: CPTII,S$GLB,, | Performed by: FAMILY MEDICINE

## 2022-02-08 PROCEDURE — 99999 PR PBB SHADOW E&M-EST. PATIENT-LVL IV: ICD-10-PCS | Mod: PBBFAC,,, | Performed by: FAMILY MEDICINE

## 2022-02-08 RX ORDER — ZOSTER VACCINE RECOMBINANT, ADJUVANTED 50 MCG/0.5
0.5 KIT INTRAMUSCULAR ONCE
Qty: 1 EACH | Refills: 0 | Status: SHIPPED | OUTPATIENT
Start: 2022-02-08 | End: 2022-02-08

## 2022-02-08 NOTE — PATIENT INSTRUCTIONS
Patient Education       Fluticasone (Nasal) (floo TIK a sone)   Brand Names: US Flonase Allergy Relief [OTC]; Flonase Sensimist [OTC]; GoodSense Nasoflow [OTC] [DSC]; Ticaspray [DSC]; Xhance   Brand Names: Fabricio APO-Fluticasone; Avamys; RATIO-Fluticasone [DSC]; TEVA-Fluticasone   What is this drug used for?   · It is used to ease allergy signs.    What do I need to tell my doctor BEFORE I take this drug?   · If you have an allergy to fluticasone or any other part of this drug.  · If you are allergic to this drug; any part of this drug; or any other drugs, foods, or substances. Tell your doctor about the allergy and what signs you had.  · If you are taking any of these drugs: Atazanavir, clarithromycin, conivaptan, indinavir, itraconazole, ketoconazole, lopinavir, nefazodone, nelfinavir, ritonavir, saquinavir, telithromycin, or voriconazole.  This is not a list of all drugs or health problems that interact with this drug.  Tell your doctor and pharmacist about all of your drugs (prescription or OTC, natural products, vitamins) and health problems. You must check to make sure that it is safe for you to take this drug with all of your drugs and health problems. Do not start, stop, or change the dose of any drug without checking with your doctor.  What are some things I need to know or do while I take this drug?   · Tell all of your health care providers that you take this drug. This includes your doctors, nurses, pharmacists, and dentists.  · Do not use this drug to treat asthma. Talk with the doctor.  · This drug may raise the chance of cataracts or glaucoma. Talk with the doctor.  · Have an eye exam as you have been told by your doctor.  · This drug may cause weak bones (osteoporosis) with long-term use. Talk with your doctor to see if you have a higher chance of weak bones or if you have any questions.  · Have a bone density test as you have been told by your doctor. Talk with your doctor.  · Talk with your doctor  if you come into contact with anyone who has chickenpox or measles and you have not had chickenpox, measles, or the vaccines for them.  · If you have come into contact with anyone who has tuberculosis (TB), talk with your doctor.  · If you have had any recent nose surgery, injury, ulcers, or sores, talk with your doctor.  · If you are switching to this drug from another form of steroid (like oral), the dose of the other steroid may need to be lowered slowly to avoid side effects. Do not suddenly stop the other steroid without talking with your doctor.  · This drug may affect growth in children and teens in some cases. They may need regular growth checks. Talk with the doctor.  · Different brands of this drug may be for use in different ages of children. Talk with the doctor before giving this drug to a child.  · Tell your doctor if you are pregnant, plan on getting pregnant, or are breast-feeding. You will need to talk about the benefits and risks to you and the baby.    What are some side effects that I need to call my doctor about right away?   WARNING/CAUTION: Even though it may be rare, some people may have very bad and sometimes deadly side effects when taking a drug. Tell your doctor or get medical help right away if you have any of the following signs or symptoms that may be related to a very bad side effect:  · Signs of an allergic reaction, like rash; hives; itching; red, swollen, blistered, or peeling skin with or without fever; wheezing; tightness in the chest or throat; trouble breathing, swallowing, or talking; unusual hoarseness; or swelling of the mouth, face, lips, tongue, or throat.  · Signs of a weak adrenal gland like a very bad upset stomach or throwing up, very bad dizziness or passing out, muscle weakness, feeling very tired, mood changes, not hungry, or weight loss.  · Nose sores.  · Bad nosebleeds.  · Whistling sound when you breathe.  · Redness or white patches in mouth or throat.  · Change  in eyesight.  · Nose discharge that is not normal.  · Very bad face pain.  · Crusting in the nose.  · Runny nose.  · Bone pain.  · Fever or chills.  · Very bad sore throat.  What are some other side effects of this drug?   All drugs may cause side effects. However, many people have no side effects or only have minor side effects. Call your doctor or get medical help if any of these side effects or any other side effects bother you or do not go away:  All products:   · Headache.  · Nose or throat irritation.  · Nosebleed.  · Cough.  · Upset stomach or throwing up.  Nasal spray (Xhance):   · Stuffy nose.  All products other than Xhance:   · Stinging or sneezing may happen for a few seconds after you use this drug. Talk with your doctor if this bothers you.  These are not all of the side effects that may occur. If you have questions about side effects, call your doctor. Call your doctor for medical advice about side effects.  You may report side effects to your national health agency.  You may report side effects to the FDA at 1-293.551.5253. You may also report side effects at https://www.fda.gov/medwatch.  How is this drug best taken?   Use this drug as ordered by your doctor. Read all information given to you. Follow all instructions closely.  · Do not take this drug by mouth. Use in your nose only. Keep out of your mouth and eyes (may burn).  · Keep using this drug as you have been told by your doctor or other health care provider, even if you feel well.  · Some products may have different ways to prime the pump. Some pumps may also need to be primed if not used for different periods of time. Follow how and when to prime as you have been told.  · Spray up the nose only. Do not spray onto the wall joining the two nostrils.  · Shake well before use.  · Blow your nose before use.  What do I do if I miss a dose?   · Use a missed dose as soon as you think about it.  · If it is close to the time for your next dose, skip  the missed dose and go back to your normal time.  · Do not use 2 doses at the same time or extra doses.    How do I store and/or throw out this drug?   · Store at room temperature. Throw away any part not used after labeled number of doses are used.  · Protect from heat, cold, and light.  · Store upright with the cap on.  · Keep all drugs in a safe place. Keep all drugs out of the reach of children and pets.  · Throw away unused or  drugs. Do not flush down a toilet or pour down a drain unless you are told to do so. Check with your pharmacist if you have questions about the best way to throw out drugs. There may be drug take-back programs in your area.    General drug facts   · If your symptoms or health problems do not get better or if they become worse, call your doctor.  · Do not share your drugs with others and do not take anyone else's drugs.  · Some drugs may have another patient information leaflet. If you have any questions about this drug, please talk with your doctor, nurse, pharmacist, or other health care provider.  · Some drugs may have another patient information leaflet. Check with your pharmacist. If you have any questions about this drug, please talk with your doctor, nurse, pharmacist, or other health care provider.  · If you think there has been an overdose, call your poison control center or get medical care right away. Be ready to tell or show what was taken, how much, and when it happened.    Consumer Information Use and Disclaimer   This generalized information is a limited summary of diagnosis, treatment, and/or medication information. It is not meant to be comprehensive and should be used as a tool to help the user understand and/or assess potential diagnostic and treatment options. It does NOT include all information about conditions, treatments, medications, side effects, or risks that may apply to a specific patient. It is not intended to be medical advice or a substitute for the  medical advice, diagnosis, or treatment of a health care provider based on the health care provider's examination and assessment of a patient's specific and unique circumstances. Patients must speak with a health care provider for complete information about their health, medical questions, and treatment options, including any risks or benefits regarding use of medications. This information does not endorse any treatments or medications as safe, effective, or approved for treating a specific patient. Up[employer] and its affiliates disclaim any warranty or liability relating to this information or the use thereof. The use of this information is governed by the Terms of Use, available at https://www.[employer].com/en/solutions/lexicomp/about/delia.  Last Reviewed Date   2019-07-16  Copyright   © 2021 Up[employer] Inc[employer] and its affiliates and/or licensors. All rights reserved.

## 2022-02-08 NOTE — PROGRESS NOTES
PSA reviewed and NP visit  Please book in with me within 2 months (pvt slot ok) with psa free/total and Cr 1 week prior    As well, last year noted may need aortic valve replacement by Dr Gallegos  Please get updated status on this and last note from dr gallegos prior to visit to discuss cardiac status during further workup

## 2022-02-08 NOTE — PROGRESS NOTES
Subjective:       Patient ID: Jaswinder Cifuentes is a 66 y.o. male.    Chief Complaint: Establish Care    Here to establish care. Former pt of Dr. Dalal. Followed by multiple specialties.     Brings up issue today of cough that has been since January. No other symptoms associated. Started when weather changed. Feels dripping sensation in back of his throat. Has tried tessalon with no relief. Does not he gets seasonal allergies occasionally.     Review of Systems   Constitutional: Negative for activity change, appetite change, chills and fever.   HENT: Negative for nasal congestion, ear discharge, ear pain and sinus pressure/congestion.    Eyes: Negative for pain and itching.   Respiratory: Negative for chest tightness and shortness of breath.    Cardiovascular: Negative for chest pain and palpitations.   Gastrointestinal: Negative for abdominal pain, constipation, nausea and vomiting.   Endocrine: Negative for cold intolerance and heat intolerance.   Musculoskeletal: Negative for arthralgias, joint swelling and myalgias.   Integumentary:  Negative for color change and rash.   Neurological: Negative for dizziness, weakness and headaches.   Psychiatric/Behavioral: Negative for agitation, behavioral problems and confusion.         Objective:      Physical Exam  Vitals and nursing note reviewed.   Constitutional:       Appearance: He is well-developed and well-nourished.   HENT:      Head: Normocephalic and atraumatic.      Nose: Rhinorrhea present.   Eyes:      Extraocular Movements: EOM normal.      Pupils: Pupils are equal, round, and reactive to light.   Cardiovascular:      Rate and Rhythm: Normal rate and regular rhythm.      Heart sounds: No murmur heard.      Pulmonary:      Effort: Pulmonary effort is normal. No respiratory distress.      Breath sounds: Normal breath sounds. No wheezing or rales.   Abdominal:      General: There is no distension.      Palpations: Abdomen is soft.      Tenderness: There is no  abdominal tenderness. There is no guarding.   Musculoskeletal:         General: Normal range of motion.      Cervical back: Normal range of motion and neck supple.   Skin:     General: Skin is warm and dry.   Neurological:      Mental Status: He is alert and oriented to person, place, and time.      Deep Tendon Reflexes: Reflexes normal.   Psychiatric:         Mood and Affect: Mood and affect normal.         Behavior: Behavior normal.         Thought Content: Thought content normal.         Judgment: Judgment normal.         Assessment:       Problem List Items Addressed This Visit        Cardiac/Vascular    Good hypertension control - Primary    Mixed hyperlipidemia    Heart murmur       Renal/    Benign prostatic hyperplasia without lower urinary tract symptoms       Endocrine    Pre-diabetes    Body mass index (BMI) of 33.0-33.9 in adult       Orthopedic    Seropositive rheumatoid arthritis of multiple sites      Other Visit Diagnoses     Post-nasal drip              Plan:       Jaswinder was seen today for establish care.    Diagnoses and all orders for this visit:    Good hypertension control    Seropositive rheumatoid arthritis of multiple sites    Heart murmur    Mixed hyperlipidemia    Benign prostatic hyperplasia without lower urinary tract symptoms    Body mass index (BMI) of 33.0-33.9 in adult    Pre-diabetes    Post-nasal drip    Other orders  -     varicella-zoster [id number], PF, (SHINGRIX, PF,) 50 mcg/0.5 mL injection; Inject 0.5 mLs into the muscle once. for 1 dose    all health maintenance UTD. Shingrix sent to pharmacy.     Recommend using flonase and daily allergy medication like zyrtec for PND.

## 2022-02-14 ENCOUNTER — TELEPHONE (OUTPATIENT)
Dept: UROLOGY | Facility: CLINIC | Age: 67
End: 2022-02-14
Payer: MEDICARE

## 2022-02-14 NOTE — TELEPHONE ENCOUNTER
Dr gallegos 12/22/21 progress note received  6 montyh follow up  Some palpitations since last visit-  Severe AS: Will check with [facility] clinic to see when he is to be evaluated again, at this point in time he remains asymptomatic  HTN: continue benazepril and metoprolol. FU 3 mos.

## 2022-02-23 DIAGNOSIS — D84.9 IMMUNOSUPPRESSED STATUS: ICD-10-CM

## 2022-03-17 ENCOUNTER — PATIENT OUTREACH (OUTPATIENT)
Dept: ADMINISTRATIVE | Facility: OTHER | Age: 67
End: 2022-03-17
Payer: MEDICARE

## 2022-03-17 NOTE — PROGRESS NOTES
Health Maintenance Due   Topic Date Due    Shingles Vaccine (1 of 2) Never done    COVID-19 Vaccine (2 - Booster for Bang series) 05/01/2021    Influenza Vaccine (1) 09/01/2021     Updates were requested from care everywhere.  Chart was reviewed for overdue Proactive [facility] Encounters (DAMIEN) topics (CRS, Breast Cancer Screening, Eye exam)  Health Maintenance has been updated.  LINKS immunization registry triggered.  Immunizations were reconciled.

## 2022-03-18 ENCOUNTER — OFFICE VISIT (OUTPATIENT)
Dept: RHEUMATOLOGY | Facility: CLINIC | Age: 67
End: 2022-03-18
Payer: MEDICARE

## 2022-03-18 VITALS
BODY MASS INDEX: 34.25 KG/M2 | SYSTOLIC BLOOD PRESSURE: 145 MMHG | HEART RATE: 60 BPM | WEIGHT: 231.94 LBS | DIASTOLIC BLOOD PRESSURE: 72 MMHG

## 2022-03-18 DIAGNOSIS — R70.0 ELEVATED SED RATE: ICD-10-CM

## 2022-03-18 DIAGNOSIS — M05.79 SEROPOSITIVE RHEUMATOID ARTHRITIS OF MULTIPLE SITES: Primary | ICD-10-CM

## 2022-03-18 PROCEDURE — 3078F DIAST BP <80 MM HG: CPT | Mod: CPTII,S$GLB,, | Performed by: INTERNAL MEDICINE

## 2022-03-18 PROCEDURE — 3077F SYST BP >= 140 MM HG: CPT | Mod: CPTII,S$GLB,, | Performed by: INTERNAL MEDICINE

## 2022-03-18 PROCEDURE — 1160F PR REVIEW ALL MEDS BY PRESCRIBER/CLIN PHARMACIST DOCUMENTED: ICD-10-PCS | Mod: CPTII,S$GLB,, | Performed by: INTERNAL MEDICINE

## 2022-03-18 PROCEDURE — 1126F AMNT PAIN NOTED NONE PRSNT: CPT | Mod: CPTII,S$GLB,, | Performed by: INTERNAL MEDICINE

## 2022-03-18 PROCEDURE — 1159F PR MEDICATION LIST DOCUMENTED IN MEDICAL RECORD: ICD-10-PCS | Mod: CPTII,S$GLB,, | Performed by: INTERNAL MEDICINE

## 2022-03-18 PROCEDURE — 99214 PR OFFICE/OUTPT VISIT, EST, LEVL IV, 30-39 MIN: ICD-10-PCS | Mod: S$GLB,,, | Performed by: INTERNAL MEDICINE

## 2022-03-18 PROCEDURE — 99214 OFFICE O/P EST MOD 30 MIN: CPT | Mod: S$GLB,,, | Performed by: INTERNAL MEDICINE

## 2022-03-18 PROCEDURE — 1159F MED LIST DOCD IN RCRD: CPT | Mod: CPTII,S$GLB,, | Performed by: INTERNAL MEDICINE

## 2022-03-18 PROCEDURE — 3008F BODY MASS INDEX DOCD: CPT | Mod: CPTII,S$GLB,, | Performed by: INTERNAL MEDICINE

## 2022-03-18 PROCEDURE — 1126F PR PAIN SEVERITY QUANTIFIED, NO PAIN PRESENT: ICD-10-PCS | Mod: CPTII,S$GLB,, | Performed by: INTERNAL MEDICINE

## 2022-03-18 PROCEDURE — 99999 PR PBB SHADOW E&M-EST. PATIENT-LVL III: CPT | Mod: PBBFAC,,, | Performed by: INTERNAL MEDICINE

## 2022-03-18 PROCEDURE — 1160F RVW MEDS BY RX/DR IN RCRD: CPT | Mod: CPTII,S$GLB,, | Performed by: INTERNAL MEDICINE

## 2022-03-18 PROCEDURE — 4010F PR ACE/ARB THEARPY RXD/TAKEN: ICD-10-PCS | Mod: CPTII,S$GLB,, | Performed by: INTERNAL MEDICINE

## 2022-03-18 PROCEDURE — 99999 PR PBB SHADOW E&M-EST. PATIENT-LVL III: ICD-10-PCS | Mod: PBBFAC,,, | Performed by: INTERNAL MEDICINE

## 2022-03-18 PROCEDURE — 3077F PR MOST RECENT SYSTOLIC BLOOD PRESSURE >= 140 MM HG: ICD-10-PCS | Mod: CPTII,S$GLB,, | Performed by: INTERNAL MEDICINE

## 2022-03-18 PROCEDURE — 3008F PR BODY MASS INDEX (BMI) DOCUMENTED: ICD-10-PCS | Mod: CPTII,S$GLB,, | Performed by: INTERNAL MEDICINE

## 2022-03-18 PROCEDURE — 4010F ACE/ARB THERAPY RXD/TAKEN: CPT | Mod: CPTII,S$GLB,, | Performed by: INTERNAL MEDICINE

## 2022-03-18 PROCEDURE — 3078F PR MOST RECENT DIASTOLIC BLOOD PRESSURE < 80 MM HG: ICD-10-PCS | Mod: CPTII,S$GLB,, | Performed by: INTERNAL MEDICINE

## 2022-03-18 NOTE — PATIENT INSTRUCTIONS
- In absence of active disease continue without Leflunomide and observe  - Call clinic if RA returns  - Continue Etodolac for flares/joint pain as needed  - Repeat labs in 3 months  - Return to clinic in 6 months

## 2022-03-18 NOTE — PROGRESS NOTES
Subjective:       Patient ID: Jaswinder Cifuentes is a 66 y.o. male.    Chief Complaint: seropositive rheumatoid arthritis    67 yo male with hypertension, diabetes, aortic stenosis and BPH who presents for follow-up of seropositive rheumatoid arthritis. He was last seen in clinic on 6/22/21 at which time he had just gotten over a recent flare and was stable without evidence of active rheumatoid arthritis. He did however have elevations in his CRP and ESR above his typical elevated values.     Today he reports he is doing well. He ran out of Leflunomide 2 months ago and reports he had trouble getting a refill so he has not taken any since that time. He has had no episodes of acute joint pain, swelling or erythema. He does reports an episode of left calf cramping that woke him from sleep on Monday but resolved quickly with Etodolac and Excedrin. He no longer reports any pain or tenderness in the left calf and has not needed Etodolac or Excedrin since that time.     Reports he has an appointment with cardiology coming up to discuss his aortic stenosis and possible valve replacement. He reports some tachycardia and occasional palpitations but denies SOB and syncope.     Review of Systems   Constitutional: Negative for chills, fatigue and fever.   HENT: Positive for congestion. Negative for mouth sores, sore throat and trouble swallowing.    Eyes: Negative for pain and redness.   Respiratory: Positive for cough. Negative for chest tightness and shortness of breath.    Cardiovascular: Negative for chest pain and leg swelling.   Gastrointestinal: Negative for abdominal distention, abdominal pain, constipation, diarrhea, nausea and vomiting.   Genitourinary: Negative for difficulty urinating, dysuria, hematuria and urgency.   Skin: Negative for color change, pallor and rash.   Neurological: Negative for syncope, weakness, light-headedness, numbness and headaches.   Hematological: Negative.    Psychiatric/Behavioral:  Negative.          Objective:   BP (!) 145/72   Pulse 60   Wt 105.2 kg (231 lb 14.8 oz)   BMI 34.25 kg/m²          Physical Exam   Constitutional: He is oriented to person, place, and time. No distress.   HENT:   Head: Normocephalic and atraumatic.   Mouth/Throat: Mucous membranes are moist.   Eyes: Pupils are equal, round, and reactive to light. Conjunctivae are normal.   Cardiovascular: Normal rate, regular rhythm and normal pulses.   Systolic crescendo-decrescendo murmur present    Pulmonary/Chest: Effort normal and breath sounds normal. No respiratory distress.   Abdominal: Soft. Normal appearance and bowel sounds are normal. He exhibits no distension. There is no abdominal tenderness.   Musculoskeletal:         General: No swelling, tenderness or deformity. Normal range of motion.      Cervical back: Normal range of motion. No tenderness.   Neurological: He is alert and oriented to person, place, and time.   Skin: No rash noted. No erythema.   Psychiatric: His behavior is normal. Mood and thought content normal.       Right Side Rheumatological Exam   Examination finds the 1st PIP, 1st MCP, 2nd PIP, 2nd MCP, 3rd PIP, 3rd MCP, 4th PIP, 4th MCP, 5th PIP and 5th MCP normal.    Muscle Strength (0-5 scale):  Deltoid:  5  Biceps: 5/5   Triceps:  5  : 5/5   Iliopsoas: 5  Quadriceps:  5   Distal Lower Extremity: 5    Left Side Rheumatological Exam   Examination finds the 1st PIP, 1st MCP, 2nd PIP, 2nd MCP, 3rd PIP, 3rd MCP, 4th PIP, 4th MCP, 5th PIP and 5th MCP normal.  No tenderness to calf, achilles tendon or posterior knee  No ankle/calf swelling    Muscle Strength (0-5 scale):  Deltoid:  5  Biceps: 5/5   Triceps:  5  :  5/5   Iliopsoas: 5  Quadriceps:  5   Distal Lower Extremity: 5    Labs (2/1/22)  Component      Latest Ref Rng & Units 2/1/2022   Sed Rate      0 - 23 mm/Hr 59 (H)   CRP      0.0 - 8.2 mg/L 11.5 (H)       Assessment/Plan   67 yo male with seropositive rheumatoid arthritis without clinical  evidence of active inflammatory arthritis, off of Leflunomide for 2 months now. Recent inflammatory markers were still elevated. CRP down trended from previous and in appropriate range for patient's body habitus. ESR still elevated above baseline and in similar range as previous labs.     - In absence of active disease will continue without Leflunomide and observe, patient agrees; instructed to call clinic if RA returns  - Continue Etodolac for flares/joint pain as needed  - Repeat labs in 3 months  - Return to clinic in 6 months         Seropositive rheumatoid arthritis of multiple sites  -     Sedimentation rate; Standing  -     C-Reactive Protein; Standing    Elevated sed rate       Problem List Items Addressed This Visit        Orthopedic    Seropositive rheumatoid arthritis of multiple sites - Primary    Relevant Orders    Sedimentation rate    C-Reactive Protein      Other Visit Diagnoses     Elevated sed rate                Servando Moore MD  [facility] PM&R PGY1  Pt was seen and discussed with Dr. Parra who is in agreement with the plan.    I have personally taken the history and examined the patient and concur with the resident's note as above.  He ran out of his leflunomide 2 months ago but is disease remains stable.  He has not shown any evidence of active synovitis in many years.  I think it not unreasonable to continue him off the leflunomide at the present time.  I will check his inflammatory markers in 3 months.  I will see him in follow-up in 6 months.

## 2022-04-01 ENCOUNTER — LAB VISIT (OUTPATIENT)
Dept: LAB | Facility: HOSPITAL | Age: 67
End: 2022-04-01
Attending: UROLOGY
Payer: MEDICARE

## 2022-04-01 DIAGNOSIS — R97.20 ELEVATED PSA: ICD-10-CM

## 2022-04-01 LAB
CREAT SERPL-MCNC: 1.1 MG/DL (ref 0.5–1.4)
EST. GFR  (AFRICAN AMERICAN): >60 ML/MIN/1.73 M^2
EST. GFR  (NON AFRICAN AMERICAN): >60 ML/MIN/1.73 M^2

## 2022-04-01 PROCEDURE — 82565 ASSAY OF CREATININE: CPT | Performed by: UROLOGY

## 2022-04-01 PROCEDURE — 84154 ASSAY OF PSA FREE: CPT | Performed by: UROLOGY

## 2022-04-01 PROCEDURE — 84153 ASSAY OF PSA TOTAL: CPT | Performed by: UROLOGY

## 2022-04-01 PROCEDURE — 36415 COLL VENOUS BLD VENIPUNCTURE: CPT | Mod: PO | Performed by: UROLOGY

## 2022-04-04 LAB
PROSTATE SPECIFIC ANTIGEN, TOTAL: 3.8 NG/ML (ref 0–4)
PSA FREE MFR SERPL: 15.26 %
PSA FREE SERPL-MCNC: 0.58 NG/ML (ref 0–1.5)

## 2022-04-07 NOTE — PROGRESS NOTES
[facility] Urology Progress Note    Jaswinder Cifuentes is a 66 y.o. male who presents for BPH follow up as well as evaluation of elevated psa    PMHx of HTN, HLD, AP, DM, AS, RA, who presents for BPH follow up     Started flomax in  with reduction in AUSS from 31/4 to 7/2 with less urgency/frequency and less nocturia in 2019 pvr 0, then fu with NP in  AUA SS 5/3, pvr 46.   Continued flomax     21: has been told by cardiology Dr Olson he may need AV replacement. Otherwise urinating well, stable on flomax. AUA SS: 5/2 (3: sleeping, 2 frequency    Dr olson 21; Some palpitations since last visit, Severe AS: Will check with TAVR clinic to see when he is to be evaluated again, at this point in time he remains asymptomatic  HTN: continue benazepril and metoprolol. FU 3 mos    22 NP f/u AUA SS:  17/3 (ICBE:2, Frequency:3, Intermittency:2, Urgency:3, Weak stream:2, Strainin, Nocturia:4)  NTFx4 only while drinking during holidays, since stopping alcohol only once nightly    Dr Parra - rheumatology - RA - off flutamide     Reference Range  21 09:28 22 07:19 22 08:15   PSA, Screen 0.00 - 4.00  2.8 [1] 4.5 (H) [2]    PSA Total 0.00 - 4.00    3.8 [3]   PSA, Free 0.00 - 1.50    0.58   PSA, Free % Not est %   15.26     PSA 3.3 on 19, 1.9 on 18, 3.1 on 16, 2.2 in , 1.2 in     No dysuria or hematuria.  No UTI history.  No perineal, perirectal, ejaculatory testicular pain  No family history of prostate cancer  Beer in evening increases nocturnal frequency  Still on flomax 0.4mg and feels like he is urinating well   AUA SS: 8/2 (2:  Urgency, sleeping; 1:  Emptying, frequency, intermittency, weak stream) meds help 9/10  AP - no cpap, not controlled    Echo and holter last week and holter had more than expected arrythmias, so starting 30d holter soon.    CY 2 mos ago noted to be 35g benign, some hemorrhoids    Focused Physical Exam:    Vitals:    22 1039  "  BP: 116/65   Pulse: (!) 55   Resp: 18     Body mass index is 34.11 kg/m². Weight: 104.8 kg (231 lb) Height: 5' 9" (175.3 cm)     Abdomen: Soft, non-tender, nondistended, no CVA tenderness        Recent Results (from the past 336 hour(s))   PSA, Total and Free    Collection Time: 04/01/22  8:15 AM   Result Value Ref Range    PSA Total 3.8 0.00 - 4.00 ng/mL    PSA, Free 0.58 0.00 - 1.50 ng/mL    PSA, Free % 15.26 Not established %   Creatinine, Serum    Collection Time: 04/01/22  8:15 AM   Result Value Ref Range    Creatinine 1.1 0.5 - 1.4 mg/dL    eGFR if African American >60.0 >60 mL/min/1.73 m^2    eGFR if non African American >60.0 >60 mL/min/1.73 m^2   POCT Bladder Scan    Collection Time: 04/08/22 10:47 AM   Result Value Ref Range    POC Residual Urine Volume 3 0 - 100 mL   POCT URINE DIPSTICK WITHOUT MICROSCOPE    Collection Time: 04/08/22 10:47 AM   Result Value Ref Range    Color, UA Yellow     pH, UA 7     WBC, UA neg     Nitrite, UA neg     Protein, POC neg     Glucose, UA neg     Ketones, UA neg     Urobilinogen, UA 0.2     Bilirubin, POC neg     Blood, UA neg     Clarity, UA Clear     Spec Grav UA 1.020        ASSESSMENT   1. Elevated PSA  POCT Bladder Scan    POCT URINE DIPSTICK WITHOUT MICROSCOPE    Case Request Operating Room: BIOPSY, PROSTATE, RECTAL APPROACH, WITH US GUIDANCE, CYSTOSCOPY    Place in Outpatient   2. BPH with obstruction/lower urinary tract symptoms  Case Request Operating Room: BIOPSY, PROSTATE, RECTAL APPROACH, WITH US GUIDANCE, CYSTOSCOPY    Place in Outpatient       Plan    Interim review of chart, from all providers, but as well as most recent evaluation with our nurse practitioner including labs ordered yielding elevated PSA and follow-up free and total PSA with borderline PSA but low free PSA, as well as exacerbation in LUTS.    I had a long discussion with the patient regarding the natural history of cancer in men as well as when diagnostics are indicated. We also discussed " differential for elevated psa which also includes benign enlargement and prostatitis.  We did discuss that an elevated PSA is considered a PSA greater than 4 because statistically 20% of people in this value range are found to have prostate cancer, however we also discussed a bit about PSA velocity and trends and age specific psa elevations.  Reviewed his PSA history which was provided from outside chart our initial visit noting highest value in the 3 range, was been stable 2-3, and had acute recent elevation and departure from baseline prompting further evaluation.  Recheck with free and total PSA and noted a borderline total PSA, but did discuss this can vary by 10 -20% from screening/diagnostic total PSAs, and more importantly his free PSA was low at 15%.  Given the new findings of elevated PSA and recheck with low free PSA,, I therefore recommended prostate biopsy to evaluate for underlying malignancy.  We reviewed the significance of free PSA percentage in helping risk stratify risk of underlying malignancy further.  We discussed biopsy and in detail, including 1% risk of infectious complications including sepsis but that it is an otherwise safe diagnostic procedure with expected hematuria hematospermia after.      I went over the details of a transrectal ultrasound-guided biopsy of the prostate, and described the technique in detail.   The patient will be given local injection anesthetic to block the prostate so as to minimize any pain. 12-14 biopsy specimens will be taken. These will be sent for histopathology analysis.   Complications include bleeding, fever and chills. He was also instructed to watch for any signs of fever. If he does have any fever or chills after, he was advised to come to the emergency room right away for intravenous antibiotics and possible admission to the hospital. He is to refrain from any strenuous activity including sexual activity for the next 72 hours after biopsy. He was also  advised that he may have blood while urinating, during bowel movements as well as during ejaculations. He was given a prebiopsy/postbiopsy instruction sheet was reminding him to avoid aspirin and blood thinners for 7 days prior, take the Rxed antibiotics the day before, day of, day after biopsy, and perform a fleet enema at home morning of biopsy. All questions he had were answered in detail.      As well, he did have initial presentation with severe obstructive lower urinary tract symptoms which have been well controlled on Flomax despite noted exacerbation a few months ago at visit, today at back at baseline.  He does notice exacerbations of his urgency and frequency with bladder irritants, and did review all conservative recommendations for urgency and frequency which were again provided in writing he will continue Flomax which he finds is helping, however we did discuss that beyond medical therapy, options for further evaluation intervention for BPH symptoms.  We did discuss the utility of lower tract evaluation, typically with cystoscopy and transrectal ultrasound (the latter which would be included in prostate biopsy) to help guide further recommendations, and therefore offered cystoscopy at time of prostate biopsy which he is agreeable to and procedure in detail diagnostic flexible cystourethroscopy with the use of local anesthesia with xylocaine jelly was described and patient agreeable to proceed.  In addition to medical therapy, conservative recommendations for urgency and frequency were provided in writing on the after visit summary     Cystoscopy and TRUS/bx scheduled at [facility] on 5/31/22 at patient request given upcoming travel and cardiac workup      Total time spent in/on encounter today, including face to face time with patient, counseling, medical record review, interpretation of tests/results, , and treatment plan coordination: 50 minutes

## 2022-04-08 ENCOUNTER — OFFICE VISIT (OUTPATIENT)
Dept: UROLOGY | Facility: CLINIC | Age: 67
End: 2022-04-08
Payer: MEDICARE

## 2022-04-08 VITALS
SYSTOLIC BLOOD PRESSURE: 116 MMHG | HEIGHT: 69 IN | RESPIRATION RATE: 18 BRPM | BODY MASS INDEX: 34.21 KG/M2 | HEART RATE: 55 BPM | WEIGHT: 231 LBS | DIASTOLIC BLOOD PRESSURE: 65 MMHG

## 2022-04-08 DIAGNOSIS — N40.1 BPH WITH OBSTRUCTION/LOWER URINARY TRACT SYMPTOMS: ICD-10-CM

## 2022-04-08 DIAGNOSIS — R97.20 ELEVATED PSA: Primary | ICD-10-CM

## 2022-04-08 DIAGNOSIS — N13.8 BPH WITH OBSTRUCTION/LOWER URINARY TRACT SYMPTOMS: ICD-10-CM

## 2022-04-08 LAB
BILIRUB SERPL-MCNC: NORMAL MG/DL
BLOOD URINE, POC: NORMAL
CLARITY, POC UA: CLEAR
COLOR, POC UA: YELLOW
GLUCOSE UR QL STRIP: NORMAL
KETONES UR QL STRIP: NORMAL
LEUKOCYTE ESTERASE URINE, POC: NORMAL
NITRITE, POC UA: NORMAL
PH, POC UA: 7
POC RESIDUAL URINE VOLUME: 3 ML (ref 0–100)
PROTEIN, POC: NORMAL
SPECIFIC GRAVITY, POC UA: 1.02
UROBILINOGEN, POC UA: 0.2

## 2022-04-08 PROCEDURE — 1101F PR PT FALLS ASSESS DOC 0-1 FALLS W/OUT INJ PAST YR: ICD-10-PCS | Mod: CPTII,S$GLB,, | Performed by: UROLOGY

## 2022-04-08 PROCEDURE — 3008F PR BODY MASS INDEX (BMI) DOCUMENTED: ICD-10-PCS | Mod: CPTII,S$GLB,, | Performed by: UROLOGY

## 2022-04-08 PROCEDURE — 3078F PR MOST RECENT DIASTOLIC BLOOD PRESSURE < 80 MM HG: ICD-10-PCS | Mod: CPTII,S$GLB,, | Performed by: UROLOGY

## 2022-04-08 PROCEDURE — 3288F FALL RISK ASSESSMENT DOCD: CPT | Mod: CPTII,S$GLB,, | Performed by: UROLOGY

## 2022-04-08 PROCEDURE — 99215 PR OFFICE/OUTPT VISIT, EST, LEVL V, 40-54 MIN: ICD-10-PCS | Mod: S$GLB,,, | Performed by: UROLOGY

## 2022-04-08 PROCEDURE — 1160F PR REVIEW ALL MEDS BY PRESCRIBER/CLIN PHARMACIST DOCUMENTED: ICD-10-PCS | Mod: CPTII,S$GLB,, | Performed by: UROLOGY

## 2022-04-08 PROCEDURE — 99215 OFFICE O/P EST HI 40 MIN: CPT | Mod: S$GLB,,, | Performed by: UROLOGY

## 2022-04-08 PROCEDURE — 51798 POCT BLADDER SCAN: ICD-10-PCS | Mod: S$GLB,,, | Performed by: UROLOGY

## 2022-04-08 PROCEDURE — 4010F PR ACE/ARB THEARPY RXD/TAKEN: ICD-10-PCS | Mod: CPTII,S$GLB,, | Performed by: UROLOGY

## 2022-04-08 PROCEDURE — 1126F AMNT PAIN NOTED NONE PRSNT: CPT | Mod: CPTII,S$GLB,, | Performed by: UROLOGY

## 2022-04-08 PROCEDURE — 3074F SYST BP LT 130 MM HG: CPT | Mod: CPTII,S$GLB,, | Performed by: UROLOGY

## 2022-04-08 PROCEDURE — 99999 PR PBB SHADOW E&M-EST. PATIENT-LVL IV: ICD-10-PCS | Mod: PBBFAC,,, | Performed by: UROLOGY

## 2022-04-08 PROCEDURE — 3074F PR MOST RECENT SYSTOLIC BLOOD PRESSURE < 130 MM HG: ICD-10-PCS | Mod: CPTII,S$GLB,, | Performed by: UROLOGY

## 2022-04-08 PROCEDURE — 1159F MED LIST DOCD IN RCRD: CPT | Mod: CPTII,S$GLB,, | Performed by: UROLOGY

## 2022-04-08 PROCEDURE — 81002 URINALYSIS NONAUTO W/O SCOPE: CPT | Mod: S$GLB,,, | Performed by: UROLOGY

## 2022-04-08 PROCEDURE — 3078F DIAST BP <80 MM HG: CPT | Mod: CPTII,S$GLB,, | Performed by: UROLOGY

## 2022-04-08 PROCEDURE — 4010F ACE/ARB THERAPY RXD/TAKEN: CPT | Mod: CPTII,S$GLB,, | Performed by: UROLOGY

## 2022-04-08 PROCEDURE — 51798 US URINE CAPACITY MEASURE: CPT | Mod: S$GLB,,, | Performed by: UROLOGY

## 2022-04-08 PROCEDURE — 81002 POCT URINE DIPSTICK WITHOUT MICROSCOPE: ICD-10-PCS | Mod: S$GLB,,, | Performed by: UROLOGY

## 2022-04-08 PROCEDURE — 1101F PT FALLS ASSESS-DOCD LE1/YR: CPT | Mod: CPTII,S$GLB,, | Performed by: UROLOGY

## 2022-04-08 PROCEDURE — 1126F PR PAIN SEVERITY QUANTIFIED, NO PAIN PRESENT: ICD-10-PCS | Mod: CPTII,S$GLB,, | Performed by: UROLOGY

## 2022-04-08 PROCEDURE — 1160F RVW MEDS BY RX/DR IN RCRD: CPT | Mod: CPTII,S$GLB,, | Performed by: UROLOGY

## 2022-04-08 PROCEDURE — 1159F PR MEDICATION LIST DOCUMENTED IN MEDICAL RECORD: ICD-10-PCS | Mod: CPTII,S$GLB,, | Performed by: UROLOGY

## 2022-04-08 PROCEDURE — 3288F PR FALLS RISK ASSESSMENT DOCUMENTED: ICD-10-PCS | Mod: CPTII,S$GLB,, | Performed by: UROLOGY

## 2022-04-08 PROCEDURE — 99999 PR PBB SHADOW E&M-EST. PATIENT-LVL IV: CPT | Mod: PBBFAC,,, | Performed by: UROLOGY

## 2022-04-08 PROCEDURE — 3008F BODY MASS INDEX DOCD: CPT | Mod: CPTII,S$GLB,, | Performed by: UROLOGY

## 2022-04-08 RX ORDER — CIPROFLOXACIN 500 MG/1
500 TABLET ORAL 2 TIMES DAILY
Qty: 6 TABLET | Refills: 0 | Status: SHIPPED | OUTPATIENT
Start: 2022-04-08 | End: 2022-06-15 | Stop reason: ALTCHOICE

## 2022-04-08 RX ORDER — AMOXICILLIN 500 MG
CAPSULE ORAL DAILY
COMMUNITY
End: 2024-01-12

## 2022-04-08 RX ORDER — CHOLECALCIFEROL (VITAMIN D3) 25 MCG
1000 TABLET ORAL DAILY
COMMUNITY

## 2022-04-08 RX ORDER — GENTAMICIN SULFATE 40 MG/ML
80 INJECTION, SOLUTION INTRAMUSCULAR; INTRAVENOUS
Status: CANCELLED | OUTPATIENT
Start: 2022-05-31

## 2022-05-02 ENCOUNTER — PATIENT MESSAGE (OUTPATIENT)
Dept: FAMILY MEDICINE | Facility: CLINIC | Age: 67
End: 2022-05-02
Payer: MEDICARE

## 2022-05-02 NOTE — TELEPHONE ENCOUNTER
Care Due:                  Date            Visit Type   Department     Provider  --------------------------------------------------------------------------------                                EP -                              PRIMARY      [facility]  Last Visit: 02-      CARE ([facility])   MEDICINE       Adolph Hallman  Next Visit: None Scheduled  None         None Found                                                            Last  Test          Frequency    Reason                     Performed    Due Date  --------------------------------------------------------------------------------    HBA1C.......  6 months...  metFORMIN................  Not Found    Overdue    Powered by [employer]. Reference number: [id number].   5/02/2022 9:56:48 AM CDT

## 2022-05-23 ENCOUNTER — PATIENT MESSAGE (OUTPATIENT)
Dept: RHEUMATOLOGY | Facility: CLINIC | Age: 67
End: 2022-05-23
Payer: MEDICARE

## 2022-05-23 ENCOUNTER — PATIENT MESSAGE (OUTPATIENT)
Dept: SURGERY | Facility: AMBULARY SURGERY CENTER | Age: 67
End: 2022-05-23
Payer: MEDICARE

## 2022-05-23 DIAGNOSIS — M05.79 SEROPOSITIVE RHEUMATOID ARTHRITIS OF MULTIPLE SITES: Primary | ICD-10-CM

## 2022-05-23 RX ORDER — LEFLUNOMIDE 20 MG/1
20 TABLET ORAL DAILY
Qty: 30 TABLET | Refills: 5 | Status: SHIPPED | OUTPATIENT
Start: 2022-05-23 | End: 2022-11-28 | Stop reason: SDUPTHER

## 2022-05-23 NOTE — TELEPHONE ENCOUNTER
Called and spoke to patient. Informed of provider recommendations. Patient states that he sees Dr. Olson. Clearance request faxed. Patient also states that he would like Rx for valium.

## 2022-05-24 ENCOUNTER — TELEPHONE (OUTPATIENT)
Dept: UROLOGY | Facility: CLINIC | Age: 67
End: 2022-05-24
Payer: MEDICARE

## 2022-05-24 RX ORDER — DIAZEPAM 10 MG/1
10 TABLET ORAL ONCE
Qty: 1 TABLET | Refills: 0 | Status: SHIPPED | OUTPATIENT
Start: 2022-05-24 | End: 2022-06-15 | Stop reason: ALTCHOICE

## 2022-05-24 NOTE — TELEPHONE ENCOUNTER
Cardio clearance for protate biopsy scheduled for 5/31    Per Dr Olson in writing pt is clear for biopsy from cardiac standpoint-ok to hold Eliquis for 2 days prior.

## 2022-05-25 ENCOUNTER — TELEPHONE (OUTPATIENT)
Dept: UROLOGY | Facility: CLINIC | Age: 67
End: 2022-05-25
Payer: MEDICARE

## 2022-05-25 NOTE — TELEPHONE ENCOUNTER
----- Message from Louise Powell sent at 5/25/2022  8:04 AM CDT -----  Contact: PT  Type: Needs Medical Advice    Who Called: PT   Best Call Back Number: [phone]  Additional  Information: Pt would like call back to get time of procedure on 5/31/22  Please Advise- Thank you

## 2022-05-25 NOTE — TELEPHONE ENCOUNTER
----- Message from Virginia Contreras sent at 5/25/2022  8:23 AM CDT -----  Contact: Patient  Type:  Patient Returning Call    Who Called: Patient     Who Left Message for Patient: Prema     Does the patient know what this is regarding?: procedure    Would the patient rather a call back or a response via [employer]?  Call    Best Call Back Number: [phone] (home)     Additional Information:

## 2022-05-25 NOTE — TELEPHONE ENCOUNTER
Will need to hold eliqus 5 days before  1 time dose valium sent per pt portal request, as verified  to and from. Should bring with and take on arrival after registering

## 2022-05-25 NOTE — TELEPHONE ENCOUNTER
Spoke w pt he was informed to hold eliquis for 5 days   Valium sent into pharm   Pt verified  and was given instructions on how to take medication prescribed pt voiced ok

## 2022-05-25 NOTE — TELEPHONE ENCOUNTER
Spoke with patient in regards of procedure time, gave patient the [facility] number to call and was informed that he will be called the day before his procedure with instructions. Patient understanding was verbalized.

## 2022-05-27 NOTE — DISCHARGE INSTRUCTIONS
After the procedure    Drink plenty of fluids.  You may have burning or light bleeding when you urinate--this is normal.  Medications may be prescribed to ease any discomfort or prevent infection. Take these as directed.  Call your doctor if you have heavy bleeding or blood clots, burning that lasts more than a day, a fever over 100°F  (38° C), or trouble urinating.    After Surgery:  Always be aware that any surgery can cause these symptoms:    Pain- Medication can be prescribed for pain to decrease your pain but may not completely take your pain away.  Over the Counter pain medicine my be enough and you can always use Ice and rest to help ease pain.    Bleeding- a little bleeding after a surgery is usually within normal.  If there is a lot of blood you need to notify your MD.  Emergency treatments of bleeding are cold application, elevation of the bleeding site and compression.    Infection- Infection after surgery is NOT a normal occurrence.  Signs of infection are fever, swelling, hot to touch the incision.  If this occurs notify your MD immediately.    Nausea- this can be common after a surgery especially if you have had anesthesia medicine or are taking pain medicine.  Staying on clear liquids, bland foods, gingerale, or over the counter anti nausea medicines can help.  If you vomit more than once, notify your MD.  Anti Nausea medicines can be prescribed.     After your prostate biopsy    Avoid sexual activity,lifting, strenuous physical activity or exertion for 3 days     No riding mowers, tractors, bicycles, motorcycles for 2-3 weeks    You may experience blood in your urine or stool for up to 2 weeks and in your semen for up to 6 weeks.  This is a normal side effect of the procedure and will resolve.    Drink plenty of water    Take antibiotics as prescribed    If you experience any of the following conditions, please return immediately to the clinic (during office hrs) or the Emergency Room if after  hours:       Fever       Inabiltiy to urinate       Severe bleeding    You may resume aspirin, anti inflammatory and blood thinners in 3 days    Results take at minimum 10 business days.  A 2 week follow up will be scheduled to review pathology reports in the clinic    During office hours, please call  and ask to speak with the nurse if you have any questions.  If after hours, call the [facility] On Call # to be connectied to the doctor on call    After Surgery:  Always be aware that any surgery can cause these symptoms:    Pain- Medication can be prescribed for pain to decrease your pain but may not completely take your pain away.  Over the Counter pain medicine my be enough and you can always use Ice and rest to help ease pain.    Bleeding- a little bleeding after a surgery is usually within normal.  If there is a lot of blood you need to notify your MD.  Emergency treatments of bleeding are cold application, elevation of the bleeding site and compression.    Infection- Infection after surgery is NOT a normal occurrence.  Signs of infection are fever, swelling, hot to touch the incision.  If this occurs notify your MD immediately.    Nausea- this can be common after a surgery especially if you have had anesthesia medicine or are taking pain medicine.  Staying on clear liquids, bland foods, gingerale, or over the counter anti nausea medicines can help.  If you vomit more than once, notify your MD.  Anti Nausea medicines can be prescribed.

## 2022-05-30 RX ORDER — ACETAMINOPHEN 500 MG
500 TABLET ORAL EVERY 6 HOURS PRN
COMMUNITY

## 2022-05-31 ENCOUNTER — HOSPITAL ENCOUNTER (OUTPATIENT)
Facility: AMBULARY SURGERY CENTER | Age: 67
Discharge: HOME | End: 2022-05-31
Attending: UROLOGY | Admitting: UROLOGY
Payer: MEDICARE

## 2022-05-31 DIAGNOSIS — N40.1 BPH WITH OBSTRUCTION/LOWER URINARY TRACT SYMPTOMS: ICD-10-CM

## 2022-05-31 DIAGNOSIS — R97.20 ELEVATED PSA: ICD-10-CM

## 2022-05-31 DIAGNOSIS — N13.8 BPH WITH OBSTRUCTION/LOWER URINARY TRACT SYMPTOMS: ICD-10-CM

## 2022-05-31 PROCEDURE — 88342 CHG IMMUNOCYTOCHEMISTRY: ICD-10-PCS | Mod: 26,,, | Performed by: PATHOLOGY

## 2022-05-31 PROCEDURE — 52000 PR CYSTOURETHROSCOPY: ICD-10-PCS | Mod: 59,,, | Performed by: UROLOGY

## 2022-05-31 PROCEDURE — 76872 PR US TRANSRECTAL: ICD-10-PCS | Mod: 26,,, | Performed by: UROLOGY

## 2022-05-31 PROCEDURE — 76872 US TRANSRECTAL: CPT | Mod: 26,,, | Performed by: UROLOGY

## 2022-05-31 PROCEDURE — 88305 TISSUE EXAM BY PATHOLOGIST: CPT | Performed by: PATHOLOGY

## 2022-05-31 PROCEDURE — 88305 TISSUE EXAM BY PATHOLOGIST: CPT | Mod: 26,,, | Performed by: PATHOLOGY

## 2022-05-31 PROCEDURE — 55700 HC PROSTATE NEEDLE BIOPSY: CPT | Performed by: UROLOGY

## 2022-05-31 PROCEDURE — 52000 CYSTOURETHROSCOPY: CPT | Mod: 59,,, | Performed by: UROLOGY

## 2022-05-31 PROCEDURE — 76872 US TRANSRECTAL: CPT | Performed by: UROLOGY

## 2022-05-31 PROCEDURE — 88341 PR IHC OR ICC EACH ADD'L SINGLE ANTIBODY  STAINPR: ICD-10-PCS | Mod: 26,,, | Performed by: PATHOLOGY

## 2022-05-31 PROCEDURE — 88342 IMHCHEM/IMCYTCHM 1ST ANTB: CPT | Performed by: PATHOLOGY

## 2022-05-31 PROCEDURE — 88341 IMHCHEM/IMCYTCHM EA ADD ANTB: CPT | Mod: 26,,, | Performed by: PATHOLOGY

## 2022-05-31 PROCEDURE — 88341 IMHCHEM/IMCYTCHM EA ADD ANTB: CPT | Performed by: PATHOLOGY

## 2022-05-31 PROCEDURE — 88305 TISSUE EXAM BY PATHOLOGIST: ICD-10-PCS | Mod: 26,,, | Performed by: PATHOLOGY

## 2022-05-31 PROCEDURE — 55700 PR BIOPSY OF PROSTATE,NEEDLE/PUNCH: ICD-10-PCS | Mod: ,,, | Performed by: UROLOGY

## 2022-05-31 PROCEDURE — 52000 CYSTOURETHROSCOPY: CPT | Performed by: UROLOGY

## 2022-05-31 PROCEDURE — 55700 PR BIOPSY OF PROSTATE,NEEDLE/PUNCH: CPT | Mod: ,,, | Performed by: UROLOGY

## 2022-05-31 PROCEDURE — 88342 IMHCHEM/IMCYTCHM 1ST ANTB: CPT | Mod: 26,,, | Performed by: PATHOLOGY

## 2022-05-31 RX ORDER — GENTAMICIN SULFATE 40 MG/ML
INJECTION, SOLUTION INTRAMUSCULAR; INTRAVENOUS
Status: COMPLETED
Start: 2022-05-31 | End: 2022-05-31

## 2022-05-31 RX ORDER — WATER 1 ML/ML
IRRIGANT IRRIGATION
Status: DISCONTINUED | OUTPATIENT
Start: 2022-05-31 | End: 2022-05-31 | Stop reason: HOSPADM

## 2022-05-31 RX ORDER — LIDOCAINE HYDROCHLORIDE 20 MG/ML
JELLY TOPICAL
Status: DISCONTINUED | OUTPATIENT
Start: 2022-05-31 | End: 2022-05-31 | Stop reason: HOSPADM

## 2022-05-31 RX ORDER — LIDOCAINE HYDROCHLORIDE 10 MG/ML
INJECTION, SOLUTION EPIDURAL; INFILTRATION; INTRACAUDAL; PERINEURAL
Status: DISCONTINUED | OUTPATIENT
Start: 2022-05-31 | End: 2022-05-31 | Stop reason: HOSPADM

## 2022-05-31 RX ORDER — GENTAMICIN SULFATE 40 MG/ML
80 INJECTION, SOLUTION INTRAMUSCULAR; INTRAVENOUS
Status: DISCONTINUED | OUTPATIENT
Start: 2022-05-31 | End: 2022-05-31 | Stop reason: HOSPADM

## 2022-05-31 RX ORDER — LIDOCAINE HYDROCHLORIDE 20 MG/ML
JELLY TOPICAL
Status: DISCONTINUED
Start: 2022-05-31 | End: 2022-05-31 | Stop reason: HOSPADM

## 2022-05-31 RX ORDER — LIDOCAINE HYDROCHLORIDE 10 MG/ML
INJECTION, SOLUTION EPIDURAL; INFILTRATION; INTRACAUDAL; PERINEURAL
Status: DISCONTINUED
Start: 2022-05-31 | End: 2022-05-31 | Stop reason: HOSPADM

## 2022-05-31 RX ADMIN — GENTAMICIN SULFATE 80 MG: 40 INJECTION, SOLUTION INTRAMUSCULAR; INTRAVENOUS at 11:05

## 2022-05-31 NOTE — H&P
[facility] Urology Progress Note     Jaswinder Cifuentes is a 66 y.o. male who presents for BPH follow up as well as evaluation of elevated psa     PMHx of HTN, HLD, PA, DM, AS, RA, who presents for BPH follow up     Started flomax in  with reduction in AUSS from 31/4 to 7/2 with less urgency/frequency and less nocturia in 2019 pvr 0, then fu with NP in  AUA SS 5/3, pvr 46.   Continued flomax     21: has been told by cardiology Dr Olson he may need AV replacement. Otherwise urinating well, stable on flomax. AUA SS: 5/2 (3: sleeping, 2 frequency     Dr olson 21; Some palpitations since last visit, Severe AS: Will check with TAVR clinic to see when he is to be evaluated again, at this point in time he remains asymptomatic  HTN: continue benazepril and metoprolol. FU 3 mos     22 NP f/u AUA SS:  17/3 (ICBE:2, Frequency:3, Intermittency:2, Urgency:3, Weak stream:2, Strainin, Nocturia:4)  NTFx4 only while drinking during holidays, since stopping alcohol only once nightly     Dr Parra - rheumatology - RA - off flutamide       Reference Range  21 09:28 22 07:19 22 08:15   PSA, Screen 0.00 - 4.00  2.8 [1] 4.5 (H) [2]     PSA Total 0.00 - 4.00      3.8 [3]   PSA, Free 0.00 - 1.50      0.58   PSA, Free % Not est %     15.26      PSA 3.3 on 19, 1.9 on 18, 3.1 on 16, 2.2 in , 1.2 in      No dysuria or hematuria.  No UTI history.  No perineal, perirectal, ejaculatory testicular pain  No family history of prostate cancer  Beer in evening increases nocturnal frequency  Still on flomax 0.4mg and feels like he is urinating well   AUA SS: 8/2 (2:  Urgency, sleeping; 1:  Emptying, frequency, intermittency, weak stream) meds help 9/10  AP - no cpap, not controlled     Echo and holter last week and holter had more than expected arrythmias, so starting 30d holter soon.     CY 2 mos ago noted to be 35g benign, some hemorrhoids     Focused Physical Exam:        "  Vitals:     04/08/22 1039   BP: 116/65   Pulse: (!) 55   Resp: 18      Body mass index is 34.11 kg/m². Weight: 104.8 kg (231 lb) Height: 5' 9" (175.3 cm)      Abdomen: Soft, non-tender, nondistended, no CVA tenderness           Recent Results         Recent Results (from the past 336 hour(s))   PSA, Total and Free     Collection Time: 04/01/22  8:15 AM   Result Value Ref Range     PSA Total 3.8 0.00 - 4.00 ng/mL     PSA, Free 0.58 0.00 - 1.50 ng/mL     PSA, Free % 15.26 Not established %   Creatinine, Serum     Collection Time: 04/01/22  8:15 AM   Result Value Ref Range     Creatinine 1.1 0.5 - 1.4 mg/dL     eGFR if African American >60.0 >60 mL/min/1.73 m^2     eGFR if non African American >60.0 >60 mL/min/1.73 m^2   POCT Bladder Scan     Collection Time: 04/08/22 10:47 AM   Result Value Ref Range     POC Residual Urine Volume 3 0 - 100 mL   POCT URINE DIPSTICK WITHOUT MICROSCOPE     Collection Time: 04/08/22 10:47 AM   Result Value Ref Range     Color, UA Yellow       pH, UA 7       WBC, UA neg       Nitrite, UA neg       Protein, POC neg       Glucose, UA neg       Ketones, UA neg       Urobilinogen, UA 0.2       Bilirubin, POC neg       Blood, UA neg       Clarity, UA Clear       Spec Grav UA 1.020              ASSESSMENT   1. Elevated PSA  POCT Bladder Scan     POCT URINE DIPSTICK WITHOUT MICROSCOPE     Case Request Operating Room: BIOPSY, PROSTATE, RECTAL APPROACH, WITH US GUIDANCE, CYSTOSCOPY     Place in Outpatient   2. BPH with obstruction/lower urinary tract symptoms  Case Request Operating Room: BIOPSY, PROSTATE, RECTAL APPROACH, WITH US GUIDANCE, CYSTOSCOPY     Place in Outpatient         Plan    Interim review of chart, from all providers, but as well as most recent evaluation with our nurse practitioner including labs ordered yielding elevated PSA and follow-up free and total PSA with borderline PSA but low free PSA, as well as exacerbation in LUTS.     I had a long discussion with the patient " regarding the natural history of cancer in men as well as when diagnostics are indicated. We also discussed differential for elevated psa which also includes benign enlargement and prostatitis.  We did discuss that an elevated PSA is considered a PSA greater than 4 because statistically 20% of people in this value range are found to have prostate cancer, however we also discussed a bit about PSA velocity and trends and age specific psa elevations.  Reviewed his PSA history which was provided from outside chart our initial visit noting highest value in the 3 range, was been stable 2-3, and had acute recent elevation and departure from baseline prompting further evaluation.  Recheck with free and total PSA and noted a borderline total PSA, but did discuss this can vary by 10 -20% from screening/diagnostic total PSAs, and more importantly his free PSA was low at 15%.  Given the new findings of elevated PSA and recheck with low free PSA,, I therefore recommended prostate biopsy to evaluate for underlying malignancy.  We reviewed the significance of free PSA percentage in helping risk stratify risk of underlying malignancy further.  We discussed biopsy and in detail, including 1% risk of infectious complications including sepsis but that it is an otherwise safe diagnostic procedure with expected hematuria hematospermia after.      I went over the details of a transrectal ultrasound-guided biopsy of the prostate, and described the technique in detail.   The patient will be given local injection anesthetic to block the prostate so as to minimize any pain. 12-14 biopsy specimens will be taken. These will be sent for histopathology analysis.   Complications include bleeding, fever and chills. He was also instructed to watch for any signs of fever. If he does have any fever or chills after, he was advised to come to the emergency room right away for intravenous antibiotics and possible admission to the hospital. He is to  refrain from any strenuous activity including sexual activity for the next 72 hours after biopsy. He was also advised that he may have blood while urinating, during bowel movements as well as during ejaculations. He was given a prebiopsy/postbiopsy instruction sheet was reminding him to avoid aspirin and blood thinners for 7 days prior, take the Rxed antibiotics the day before, day of, day after biopsy, and perform a fleet enema at home morning of biopsy. All questions he had were answered in detail.      As well, he did have initial presentation with severe obstructive lower urinary tract symptoms which have been well controlled on Flomax despite noted exacerbation a few months ago at visit, today at back at baseline.  He does notice exacerbations of his urgency and frequency with bladder irritants, and did review all conservative recommendations for urgency and frequency which were again provided in writing he will continue Flomax which he finds is helping, however we did discuss that beyond medical therapy, options for further evaluation intervention for BPH symptoms.  We did discuss the utility of lower tract evaluation, typically with cystoscopy and transrectal ultrasound (the latter which would be included in prostate biopsy) to help guide further recommendations, and therefore offered cystoscopy at time of prostate biopsy which he is agreeable to and procedure in detail diagnostic flexible cystourethroscopy with the use of local anesthesia with xylocaine jelly was described and patient agreeable to proceed.  In addition to medical therapy, conservative recommendations for urgency and frequency were provided in writing on the after visit summary     Cystoscopy and TRUS/bx scheduled at [facility] on 5/31/22 at patient request given upcoming travel and cardiac workup      Acceptable for asc

## 2022-06-01 VITALS
BODY MASS INDEX: 34.21 KG/M2 | SYSTOLIC BLOOD PRESSURE: 127 MMHG | DIASTOLIC BLOOD PRESSURE: 61 MMHG | TEMPERATURE: 98 F | RESPIRATION RATE: 18 BRPM | OXYGEN SATURATION: 100 % | HEIGHT: 69 IN | WEIGHT: 231 LBS | HEART RATE: 50 BPM

## 2022-06-01 NOTE — OP NOTE
[facility] Urology Op/Brief DC Note     Date: 5/31/22     Staff Surgeon: Jaswinder Real MD     Pre-Op Diagnosis:   Elevated psa  BPH/LUTS     Post-Op Diagnosis: same     Procedure(s) Performed:   1. Transrectal ultrasound guided prostate needle biopsy  2. Cystoscopy (flexible)     INDICATION FOR PROCEDURE:   PSA up to 4.5 in 2/22, near doubling from prior year, and repeat at 3.8 but only 15% free and persistent LUTS on flomax     ANESTHESIA: Local periprostatic block; 10 cc 1% lidocaine      PSA: 3.8      TRUS VOLUME: 46.9cc (W 49.1mm; H 30mm; L 61.3mm)     EBL: Minimal     SPECIMEN:   14 core prostate biopsy - right and left base, middle, apex - medial and lateral of each, and bilateral transition zones    ULTRASOUND FINDINGS:  mild PVR, no med lobe, L sv more full than right, scattered hypoechoities, more on left near apex and TZ     CYSTO FINDINGS:    Significant prostate obstruction with anterolateral lobe ingrowth centrally to meet high bladder neck and elevated floor of prostate at BN with eearly extension of bilat lat lobes seen on retroflexion without median lobe, moderate diffuse thick trabevulations, and small cellules on posteiror wall    CONFIRMED PATIENT TOOK ANTIBIOTICS: Yes     CONFIRMED PATIENT NOT TAKING ASPIRIN OR ANTICOAGULANTS: Yes     CONFIRMED PATIENT USED ENEMA: Yes     PROCEDURE IN DETAIL:  After informed consent, the patient was prepped and drapped in standard  cystoscopic fashion and 2% xylocaine jelly was instilled into the urethra. 80mg gentamicin injected IM      Then a a flexible cystoscope was passed into the bladder via the urethra.   Anterior urethra normal without lesions or narrowings. The prostatic urethra demonstrated obstructive findings as above from  Lateral lobe obstruction without median lobe as described.     Bladder otherwise systematically inspected and no mucosal lesions or tumors. Assess for signs of obstruction such as trabeculations, cellules, diverticulum as noted  above     He was then turned to the left lateral position and TRUS probe inserted into rectum. Ultrasound measurements taken as above and ultrasound of prostate performed with findings as above. Noted to empty bladder well based on US. Approximately 10cc of 1% lidocaine injected bilaterally in kareen-prostatic block fashion, as well as at the apex.   14 total core biopies taken were taken in a sextant fashion with inclusion of transition zones.         DISHARGE:  Status post uncomplicated outpatient procedure as above.    Disposition: Home.  He will follow up in 2 weeks for biopsy results.   Resume regular diet  FU 2 weeks for biopsy results and further management discussion  Return to ER if temp >101, uncontrollable urethral or rectal bleeding, or inability to urinate/urinary retention  No sex/ejaculation x3 days, no riding mowers/tractors/bikes x2-4 weeks  Drink plenty of water may see blood  Hold asa/fishoil/bloodthinners 3-5d (eliqus resume Friday am after full 48 hr hold, then asa 2d later)

## 2022-06-08 LAB
FINAL PATHOLOGIC DIAGNOSIS: NORMAL
GROSS: NORMAL
Lab: NORMAL

## 2022-06-10 ENCOUNTER — OFFICE VISIT (OUTPATIENT)
Dept: UROLOGY | Facility: CLINIC | Age: 67
End: 2022-06-10
Payer: MEDICARE

## 2022-06-10 VITALS
SYSTOLIC BLOOD PRESSURE: 135 MMHG | BODY MASS INDEX: 32.88 KG/M2 | DIASTOLIC BLOOD PRESSURE: 73 MMHG | RESPIRATION RATE: 18 BRPM | HEART RATE: 68 BPM | WEIGHT: 222 LBS | HEIGHT: 69 IN

## 2022-06-10 DIAGNOSIS — R97.20 ELEVATED PSA: ICD-10-CM

## 2022-06-10 DIAGNOSIS — N42.32 ATYPICAL SMALL ACINAR PROLIFERATION OF PROSTATE: Primary | ICD-10-CM

## 2022-06-10 PROCEDURE — 99999 PR PBB SHADOW E&M-EST. PATIENT-LVL IV: ICD-10-PCS | Mod: PBBFAC,,, | Performed by: UROLOGY

## 2022-06-10 PROCEDURE — 99214 OFFICE O/P EST MOD 30 MIN: CPT | Mod: PBBFAC,PN | Performed by: UROLOGY

## 2022-06-10 PROCEDURE — 99999 PR PBB SHADOW E&M-EST. PATIENT-LVL IV: CPT | Mod: PBBFAC,,, | Performed by: UROLOGY

## 2022-06-10 RX ORDER — TAMSULOSIN HYDROCHLORIDE 0.4 MG/1
0.4 CAPSULE ORAL NIGHTLY
Qty: 30 CAPSULE | Refills: 11 | Status: SHIPPED | OUTPATIENT
Start: 2022-06-10 | End: 2023-07-24

## 2022-06-10 NOTE — PROGRESS NOTES
[facility] Urology Progress Note    Jaswinder Cifuentes is a 67 y.o. male who presents for f/u bph and elevated psa s/p cysto and prostate biopsy    PMHx of HTN, HLD, AP, DM, AS, RA (Dr Parra, off flutamide)     Started flomax in  with reduction in AUSS from 31/4 to 7/2 with less urgency/frequency and less nocturia in 2019 pvr 0, then fu with NP in  AUA SS 5/3, pvr 46.   Continued flomax     21: has been told by cardiology Dr Olson he may need AV replacement. Otherwise urinating well, stable on flomax. AUA SS: 5/2 (3: sleeping, 2 frequency     Dr olson 21; Some palpitations since last visit, Severe AS: Will check with TAVR clinic to see when he is to be evaluated again, at this point in time he remains asymptomatic  HTN: continue benazepril and metoprolol. FU 3 mos     22 NP f/u AUA SS:  17/3 (ICBE:2, Frequency:3, Intermittency:2, Urgency:3, Weak stream:2, Strainin, Nocturia:4)  NTFx4 only while drinking during holidays, since stopping alcohol only once nightly    PSA up from 2.8 in 21 to 4.5 on 22, and recheck 3.8 (15.26% free) on 22   Previously PSA 3.3 on 19, 1.9 on 18, 3.1 on 16, 2.2 in , 1.2 in   No dysuria or hematuria.  No UTI history.  No perineal, perirectal, ejaculatory testicular pain  No family history of prostate cancer. Beer in evening increases nocturnal frequency  Still on flomax 0.4mg and feels like he is urinating well. AUA SS: 82 (2:  Urgency, sleeping; 1:  Emptying, frequency, intermittency, weak stream) meds help 9/10  AP - no cpap, not controlled. Echo and holter last week and holter had more than expected arrythmias, so starting 30d holter soon.  CY 35g benign, some hemorrhoids    Cysto/prostate biopsy 22: volume 46.9cc, U/S: mild PVR, no med lobe, L sv more full than right, scattered hypoechoities, more on left near apex and TZ  CYSTO FINDINGS:  Significant prostate obstruction with anterolateral lobe ingrowth centrally  "to meet high bladder neck and elevated floor of prostate at  with eearly extension of bilat lat lobes seen on retroflexion without median lobe, moderate diffuse thick trabevulations, and small cellules on posteiror wall  PATH: Focus of ASAP at  med    He did well after biopsy  Minimal hematuria  Hematospermia persists, getting darker  Restarted bloodthinners without issue  Feels like LUTS are well controlled on flomax    Focused Physical Exam:    Vitals:    06/10/22 1009   BP: 135/73   Pulse: 68   Resp: 18     Body mass index is 32.78 kg/m². Weight: 100.7 kg (222 lb) Height: 5' 9" (175.3 cm)     Abdomen: Soft, non-tender, nondistended, no CVA tenderness  :  circ normal phallus without plaques/lesions, orthotopic urethral meatus, bilaterally desc testes in normal scrotum without mass or lesion  CY: normal sphincter tone, no masses, no hemmorrhoids   PROSTATE: 15-35g, no nodules, non-tender, symmetrical.       No results found for this or any previous visit (from the past 336 hour(s)).    ASSESSMENT   1. Atypical small acinar proliferation of prostate  PSA, Total and Free    PSA, Total and Free   2. Elevated PSA  PSA, Total and Free    PSA, Total and Free       Plan    Extensive discussion with pt about pathologic findings. We did review that in findings of atypical small acinar proliferation, 40% of men with this finding go on to be found to have prostate cancer, and guideline recommendation is to repeat biopsy in 6-12 months.  We did also review that his PSA elevation represents a normal PSA density in his 47 g prostate, however for further risk stratification the atypia lends itself to a percentage chance of future finding, as does his borderline free psa, which we also discussed the significance of in risk stratification as well.     If PSA continues to rise or free PSA is concerning, could get MRI of the prostate for further evaluation prior to an repeat biopsy to see if there are any clinically " significant index lesions to target. We did also discuss the utility of confirm MD X genetic testing to see if there are any methylation markers consistent with genetic lungs to prostate cancer in any of the core biopsies, which would also be targeted as well, and help provide further risk stratification.  Noted this can be performed up to 2 yrs after biopsy so will hold at this time and can utilize if psa rises, though noted negative has 90% neg predictive value.      Given comorbidities and cardiac workup for possible valve replacement etc, will follow psa f/t Q6 mos, continue flomax, and have him RTC at 1 year.       Total time spent in/on encounter today, including face to face time with patient, counseling, medical record review, interpretation of tests/results, , and treatment plan coordination: 35 minutes

## 2022-06-13 ENCOUNTER — OFFICE VISIT (OUTPATIENT)
Dept: URGENT CARE | Facility: CLINIC | Age: 67
End: 2022-06-13
Payer: MEDICARE

## 2022-06-13 ENCOUNTER — HOSPITAL ENCOUNTER (OUTPATIENT)
Dept: RADIOLOGY | Facility: HOSPITAL | Age: 67
Discharge: HOME | End: 2022-06-13
Attending: NURSE PRACTITIONER
Payer: MEDICARE

## 2022-06-13 VITALS
RESPIRATION RATE: 20 BRPM | TEMPERATURE: 99 F | HEIGHT: 69 IN | HEART RATE: 62 BPM | OXYGEN SATURATION: 96 % | BODY MASS INDEX: 33.03 KG/M2 | WEIGHT: 223 LBS | DIASTOLIC BLOOD PRESSURE: 70 MMHG | SYSTOLIC BLOOD PRESSURE: 131 MMHG

## 2022-06-13 DIAGNOSIS — M79.18 LEFT BUTTOCK PAIN: ICD-10-CM

## 2022-06-13 DIAGNOSIS — R22.42 HIP MASS, LEFT: Primary | ICD-10-CM

## 2022-06-13 DIAGNOSIS — R22.42 HIP MASS, LEFT: ICD-10-CM

## 2022-06-13 PROCEDURE — 99214 OFFICE O/P EST MOD 30 MIN: CPT | Mod: S$GLB,,, | Performed by: NURSE PRACTITIONER

## 2022-06-13 PROCEDURE — 76882 US LMTD JT/FCL EVL NVASC XTR: CPT | Mod: TC,LT

## 2022-06-13 PROCEDURE — 99214 PR OFFICE/OUTPT VISIT, EST, LEVL IV, 30-39 MIN: ICD-10-PCS | Mod: S$GLB,,, | Performed by: NURSE PRACTITIONER

## 2022-06-13 PROCEDURE — 76882 US SOFT TISSUE, LOWER EXTREMITY, LEFT: ICD-10-PCS | Mod: 26,LT,, | Performed by: RADIOLOGY

## 2022-06-13 PROCEDURE — 76882 US LMTD JT/FCL EVL NVASC XTR: CPT | Mod: 26,LT,, | Performed by: RADIOLOGY

## 2022-06-13 NOTE — PATIENT INSTRUCTIONS
Go to outpatient services at [facility] for Stat ultrasound  Follow up with Ultrasound order of left hip/buttock  Increase fluid intake  May apply heating pad to area to reduce tenderness  Tylenol or Motrin OTC for pain  Go to the ER for development of fever, increased size, drainage, or worse pain.  Clinic will follow up with you regarding U/S results

## 2022-06-13 NOTE — PROGRESS NOTES
"Subjective:       Patient ID: Jaswinder Cifuentes is a 67 y.o. male.    Vitals:  height is 5' 9" (1.753 m) and weight is 101.2 kg (223 lb). His oral temperature is 98.8 °F (37.1 °C). His blood pressure is 131/70 and his pulse is 62. His respiration is 20 and oxygen saturation is 96%.     Chief Complaint: Hip Pain    Pt states "Has lump/bump on the left hip x's 10 days; no injury; painful." States he had prostate biopsy 14 days ago and is unsure if he was given an injection in this area. Noticed the painful mass 3-4 days after procedure.    Hip Pain   The pain is present in the left hip. The symptoms are aggravated by movement. He has tried acetaminophen for the symptoms. The treatment provided no relief.       Constitution: Negative for fever.   Neck: Negative for painful lymph nodes.   Cardiovascular: Negative for leg swelling.   Respiratory: Negative for cough.    Gastrointestinal: Negative for abdominal pain.   Genitourinary: Negative for dysuria, flank pain, penile pain, testicular pain and pelvic pain.   Musculoskeletal: Positive for pain.   Skin: Negative for rash, skin thickening/induration, puncture wound, erythema and bruising.   Neurological: Negative for dizziness, light-headedness, passing out, disorientation and altered mental status.   Hematologic/Lymphatic: Negative for swollen lymph nodes.   Psychiatric/Behavioral: Negative for altered mental status and disorientation.       Objective:      Physical Exam   Constitutional: He is oriented to person, place, and time. He appears well-developed. He is cooperative.  Non-toxic appearance. He does not appear ill. No distress.   HENT:   Head: Normocephalic and atraumatic.   Ears:   Right Ear: Hearing and external ear normal.   Left Ear: Hearing and external ear normal.   Nose: Nose normal. No mucosal edema, rhinorrhea or nasal deformity. No epistaxis. Right sinus exhibits no maxillary sinus tenderness and no frontal sinus tenderness. Left sinus exhibits no " maxillary sinus tenderness and no frontal sinus tenderness.   Mouth/Throat: Uvula is midline, oropharynx is clear and moist and mucous membranes are normal. No trismus in the jaw. Normal dentition. No uvula swelling. No oropharyngeal exudate, posterior oropharyngeal edema or posterior oropharyngeal erythema.   Eyes: Conjunctivae and lids are normal. No scleral icterus.   Neck: Trachea normal and phonation normal. Neck supple. No edema present. No erythema present. No neck rigidity present.   Cardiovascular: Normal rate, regular rhythm, normal heart sounds and normal pulses.   Pulmonary/Chest: Effort normal and breath sounds normal. No respiratory distress. He has no decreased breath sounds. He has no rhonchi.   Abdominal: Normal appearance.   Musculoskeletal: Normal range of motion.         General: No deformity. Normal range of motion.        Back:    Neurological: He is alert and oriented to person, place, and time. He exhibits normal muscle tone. Coordination normal.   Skin: Skin is warm, dry, intact, not diaphoretic and not pale. Capillary refill takes 2 to 3 seconds. No erythema   Psychiatric: His speech is normal and behavior is normal. Judgment and thought content normal.   Nursing note and vitals reviewed.        Assessment:       1. Hip mass, left    2. Left buttock pain          Plan:         Hip mass, left  -     US Soft Tissue, Lower Extremity, Right; Future; Expected date: 06/13/2022    Left buttock pain  -     US Soft Tissue, Lower Extremity, Right; Future; Expected date: 06/13/2022             Go to outpatient services at [facility] for Stat ultrasound  Follow up with Ultrasound order of left hip/buttock  Increase fluid intake  May apply heating pad to area to reduce tenderness  Tylenol or Motrin OTC for pain  Go to the ER for development of fever, increased size, drainage, or worse pain.  Clinic will follow up with you regarding U/S results

## 2022-06-15 ENCOUNTER — OFFICE VISIT (OUTPATIENT)
Dept: FAMILY MEDICINE | Facility: CLINIC | Age: 67
End: 2022-06-15
Payer: MEDICARE

## 2022-06-15 VITALS
DIASTOLIC BLOOD PRESSURE: 74 MMHG | RESPIRATION RATE: 18 BRPM | HEIGHT: 69 IN | HEART RATE: 69 BPM | WEIGHT: 220 LBS | SYSTOLIC BLOOD PRESSURE: 128 MMHG | BODY MASS INDEX: 32.58 KG/M2 | OXYGEN SATURATION: 95 %

## 2022-06-15 DIAGNOSIS — Z00.00 ROUTINE GENERAL MEDICAL EXAMINATION AT A HEALTH CARE FACILITY: Primary | ICD-10-CM

## 2022-06-15 DIAGNOSIS — R73.03 PRE-DIABETES: ICD-10-CM

## 2022-06-15 DIAGNOSIS — I48.0 PAROXYSMAL ATRIAL FIBRILLATION: ICD-10-CM

## 2022-06-15 DIAGNOSIS — I10 GOOD HYPERTENSION CONTROL: ICD-10-CM

## 2022-06-15 PROCEDURE — 99214 PR OFFICE/OUTPT VISIT, EST, LEVL IV, 30-39 MIN: ICD-10-PCS | Mod: S$GLB,,, | Performed by: STUDENT IN AN ORGANIZED HEALTH CARE EDUCATION/TRAINING PROGRAM

## 2022-06-15 PROCEDURE — 99214 OFFICE O/P EST MOD 30 MIN: CPT | Mod: PBBFAC,PO | Performed by: STUDENT IN AN ORGANIZED HEALTH CARE EDUCATION/TRAINING PROGRAM

## 2022-06-15 PROCEDURE — 99999 PR PBB SHADOW E&M-EST. PATIENT-LVL IV: CPT | Mod: PBBFAC,,, | Performed by: STUDENT IN AN ORGANIZED HEALTH CARE EDUCATION/TRAINING PROGRAM

## 2022-06-15 PROCEDURE — 99214 OFFICE O/P EST MOD 30 MIN: CPT | Mod: S$GLB,,, | Performed by: STUDENT IN AN ORGANIZED HEALTH CARE EDUCATION/TRAINING PROGRAM

## 2022-06-15 PROCEDURE — 99999 PR PBB SHADOW E&M-EST. PATIENT-LVL IV: ICD-10-PCS | Mod: PBBFAC,,, | Performed by: STUDENT IN AN ORGANIZED HEALTH CARE EDUCATION/TRAINING PROGRAM

## 2022-06-15 NOTE — PROGRESS NOTES
[facility] MEDICINE CLINIC NOTE    Patient Name: Jaswinder Cifuentes  YOB: 1955    PRESENTING HISTORY   Chief Complaint:   Chief Complaint   Patient presents with    Establish Care        History of Present Illness:  Mr. Jaswinder Cifuentes is a 67 y.o. malehere to establish care.      Sees Dr. Olson.   Valvular heart disease. ?mitral  Has appointment tomorrow with Thoracic surgeon tomorrow to discuss valve replacement.   Hoping for minimally invasive.     pAF- on AC, metoprolol.     Yesterday went to  for left hip pain at site of previous injection.   Swelling. US wnl.   Pain improved, swelling still present.     Pre-dm. Good control. Never had DM.     RA- follows with Dr. Parra. On DMARD                                           Review of Systems   All other systems reviewed and are negative.        PAST HISTORY:     Past Medical History:   Diagnosis Date    Aortic stenosis     Atrial fibrillation     BPH with obstruction/lower urinary tract symptoms     Diabetes mellitus, type 2     Hyperlipidemia     Hypertension     Seropositive rheumatoid arthritis     Sleep apnea     does not using CPAP       Past Surgical History:   Procedure Laterality Date    ANGIOGRAM, CORONARY, WITH LEFT HEART CATHETERIZATION  01/2021    CYSTOSCOPY N/A 5/31/2022    Procedure: CYSTOSCOPY;  Surgeon: Jaswinder Real MD;  Location: [facility] OR;  Service: Urology;  Laterality: N/A;    REFRACTIVE SURGERY Bilateral     TRANSRECTAL BIOPSY OF PROSTATE WITH ULTRASOUND GUIDANCE N/A 5/31/2022    Procedure: BIOPSY, PROSTATE, RECTAL APPROACH, WITH US GUIDANCE;  Surgeon: Jaswinder Real MD;  Location: [facility] OR;  Service: Urology;  Laterality: N/A;       Family History   Problem Relation Age of Onset    Hypertension Mother     Hypertension Father     Heart disease Father        Social History     Socioeconomic History    Marital status:    Tobacco Use    Smoking status: Never Smoker    Smokeless  tobacco: Former User   Substance and Sexual Activity    Alcohol use: Yes     Comment: social       MEDICATIONS & ALLERGIES:     Current Outpatient Medications on File Prior to Visit   Medication Sig    acetaminophen (TYLENOL) 500 MG tablet Take 500 mg by mouth every 6 (six) hours as needed for Pain.    apixaban (ELIQUIS) 5 mg Tab Take 5 mg by mouth 2 (two) times daily.    benazepriL (LOTENSIN) 40 MG tablet Take 40 mg by mouth once daily.    blood sugar diagnostic Strp To check BG 3 times daily, to use with insurance preferred meter    cyanocobalamin (VITAMIN B-12) 1000 MCG tablet Take 100 mcg by mouth once daily.    leflunomide (ARAVA) 20 MG Tab Take 1 tablet (20 mg total) by mouth once daily.    metFORMIN (GLUCOPHAGE-XR) 500 MG ER 24hr tablet TAKE 1 TABLET(500 MG) BY MOUTH DAILY WITH BREAKFAST    metoprolol succinate (TOPROL-XL) 50 MG 24 hr tablet Take 1 tablet (50 mg total) by mouth once daily.    omega-3 fatty acids/fish oil (FISH OIL-OMEGA-3 FATTY ACIDS) 300-1,000 mg capsule Take by mouth once daily.    omeprazole (PRILOSEC) 20 MG capsule TAKE 1 CAPSULE(20 MG) BY MOUTH EVERY DAY    pyridoxine, vitamin B6, (B-6) 25 MG Tab Take 25 mg by mouth once daily.    rosuvastatin (CRESTOR) 10 MG tablet Take 1 tablet (10 mg total) by mouth once daily.    tamsulosin (FLOMAX) 0.4 mg Cap Take 1 capsule (0.4 mg total) by mouth every evening.    vitamin D (VITAMIN D3) 1000 units Tab Take 1,000 Units by mouth once daily.    ciprofloxacin HCl (CIPRO) 500 MG tablet Take 1 tablet (500 mg total) by mouth 2 (two) times daily. Day before, of, after biopsy (Patient not taking: Reported on 6/15/2022)    diazePAM (VALIUM) 10 MG Tab Take 1 tablet (10 mg total) by mouth once. for 1 dose     No current facility-administered medications on file prior to visit.       Review of patient's allergies indicates:  No Known Allergies    OBJECTIVE:   Vital Signs:  Vitals:    06/15/22 1330   BP: 128/74   Pulse: 69   Resp: 18   SpO2: 95%  "  Weight: 99.8 kg (220 lb 0.3 oz)   Height: 5' 9" (1.753 m)       No results found for this or any previous visit (from the past 24 hour(s)).      Physical Exam  Vitals and nursing note reviewed.   Constitutional:       General: He is not in acute distress.     Appearance: He is not toxic-appearing or diaphoretic.   HENT:      Head: Normocephalic and atraumatic.      Right Ear: External ear normal.      Left Ear: External ear normal.   Eyes:      General: No scleral icterus.     Conjunctiva/sclera: Conjunctivae normal.      Pupils: Pupils are equal, round, and reactive to light.   Neck:      Thyroid: No thyromegaly.      Vascular: Carotid bruit (murmur radiates to carotids. ) present.   Cardiovascular:      Rate and Rhythm: Normal rate and regular rhythm.      Heart sounds: Murmur heard.   Pulmonary:      Effort: Pulmonary effort is normal. No respiratory distress.      Breath sounds: Normal breath sounds. No wheezing or rales.   Musculoskeletal:         General: No tenderness or deformity. Normal range of motion.      Cervical back: Normal range of motion and neck supple.      Right lower leg: No edema.      Left lower leg: No edema.   Lymphadenopathy:      Cervical: No cervical adenopathy.   Skin:     General: Skin is warm and dry.      Findings: No erythema or rash.   Neurological:      Mental Status: He is alert and oriented to person, place, and time.      Gait: Gait is intact.   Psychiatric:         Mood and Affect: Mood and affect normal.         Cognition and Memory: Memory normal.         Judgment: Judgment normal.         ASSESSMENT & PLAN:     Routine general medical examination at a health care facility    Pre-diabetes  Continue metformin    Good hypertension control  Continue current medicines    Paroxysmal atrial fibrillation  Continue current medicines      67 M. Complex medical problems but has appropriate specialty care and I have a limited role in management of these.   He can see me annually. "     Chaitanya Fernández MD   Internal Medicine    This note was created using Dragon voice recognition software that occasionally misinterprets phrases or words.

## 2022-06-15 NOTE — PATIENT INSTRUCTIONS
"If you have not already received it, I would recommend shingles vaccination with Shingrix. Your immune system puts you at risk of more severe shingles outbreaks. This vaccine is safe while on immunosuppressant medications.       Generic Health Advice:  I recommend a heart healthy diet rich in fiber, fresh vegetables and fruit and low in saturated fats (fried foods, red meat, etc.).  The Mediterranean diet is probably the best for your health with a plant based diet high in nuts, beans, olive oil and a few servings a week of fish. You do not need to eat a meat at every meal to be healthy!    I also recommend regular exercise. There are targets set by the national cardiology society including a minimum of 150 minutes of cardio exercise per week. Strength exercises are also important.  2-30 minute workouts of strength training like light weights, pilates, etc.  You should work toward a body mass index of < 25.    If you are not doing any exercise right now, don't start out with these high amounts. Start slow and avoid soreness. The goal is consistency not overdoing it once. Try some body squats, pushups, brisk walking and work your way up. I am a fan of "micro workouts" spread throughout the day. These consist of doing just a few minutes of exercise periodically throughout the day when you have time. An example would be doing some half standing pushups on a stable counter or other service during a break at work.     Flexibility and balance training are often overlooked. I recommended stretching at least 3-4 times/week to prevent injuries, especially in your back. I also recommend training your balance. Yoga often has balance training included, but you can do it without a formal program. While on a soft surface with something you can sit back on if you fall like a couch, try standing on 1 leg with your eyes fixed on 1 point on the floor. Switch legs and repeat. Try doing this at least several times a week.             "

## 2022-06-17 ENCOUNTER — PATIENT MESSAGE (OUTPATIENT)
Dept: RHEUMATOLOGY | Facility: CLINIC | Age: 67
End: 2022-06-17
Payer: MEDICARE

## 2022-06-17 ENCOUNTER — LAB VISIT (OUTPATIENT)
Dept: LAB | Facility: HOSPITAL | Age: 67
End: 2022-06-17
Attending: INTERNAL MEDICINE
Payer: MEDICARE

## 2022-06-17 DIAGNOSIS — M05.79 SEROPOSITIVE RHEUMATOID ARTHRITIS OF MULTIPLE SITES: ICD-10-CM

## 2022-06-17 LAB
CRP SERPL-MCNC: 39 MG/L (ref 0–8.2)
ERYTHROCYTE [SEDIMENTATION RATE] IN BLOOD BY WESTERGREN METHOD: 40 MM/HR (ref 0–10)

## 2022-06-17 PROCEDURE — 86140 C-REACTIVE PROTEIN: CPT | Performed by: INTERNAL MEDICINE

## 2022-06-17 PROCEDURE — 85651 RBC SED RATE NONAUTOMATED: CPT | Mod: PO | Performed by: INTERNAL MEDICINE

## 2022-06-17 PROCEDURE — 36415 COLL VENOUS BLD VENIPUNCTURE: CPT | Mod: PO | Performed by: INTERNAL MEDICINE

## 2022-06-17 RX ORDER — METHYLPREDNISOLONE 4 MG/1
TABLET ORAL
Qty: 21 TABLET | Refills: 0 | Status: SHIPPED | OUTPATIENT
Start: 2022-06-17 | End: 2022-07-27 | Stop reason: ALTCHOICE

## 2022-07-18 NOTE — TELEPHONE ENCOUNTER
Care Due:                  Date            Visit Type   Department     Provider  --------------------------------------------------------------------------------                                EP -                              PRIMARY      [facility] FAMILY    Chaitanya Sheikh  Last Visit: 06-      CARE ([facility])   MEDICINE       Jolene  Next Visit: None Scheduled  None         None Found                                                            Last  Test          Frequency    Reason                     Performed    Due Date  --------------------------------------------------------------------------------    HBA1C.......  6 months...  metFORMIN................  Not Found    Overdue    [employer] Embedded Care Gaps. Reference number: [id number]. 7/18/2022   11:10:49 AM CDT   minimum assist (75% patients effort)

## 2022-07-19 RX ORDER — METFORMIN HYDROCHLORIDE 500 MG/1
TABLET, EXTENDED RELEASE ORAL
Qty: 90 TABLET | Refills: 2 | Status: SHIPPED | OUTPATIENT
Start: 2022-07-19 | End: 2023-01-27

## 2022-07-19 NOTE — TELEPHONE ENCOUNTER
Refill Routing Note   Medication(s) are not appropriate for processing by [facility] for the following reason(s):      - Required laboratory values are outdated    [facility] action(s):  Defer          Medication reconciliation completed: No     Appointments  past 12m or future 3m with PCP    Date Provider   Last Visit   6/15/2022 Chaitanya Fernández MD   Next Visit   Visit date not found Chaitanya Fernández MD   ED visits in past 90 days: 0        Note composed:11:39 AM 07/19/2022

## 2022-07-25 ENCOUNTER — LAB VISIT (OUTPATIENT)
Dept: LAB | Facility: HOSPITAL | Age: 67
End: 2022-07-25
Attending: STUDENT IN AN ORGANIZED HEALTH CARE EDUCATION/TRAINING PROGRAM
Payer: MEDICARE

## 2022-07-25 DIAGNOSIS — R50.9 HYPERTHERMIA-INDUCED DEFECT: Primary | ICD-10-CM

## 2022-07-25 DIAGNOSIS — R31.9 HEMATURIA SYNDROME: ICD-10-CM

## 2022-07-25 PROCEDURE — 81001 URINALYSIS AUTO W/SCOPE: CPT

## 2022-07-26 ENCOUNTER — TELEPHONE (OUTPATIENT)
Dept: FAMILY MEDICINE | Facility: CLINIC | Age: 67
End: 2022-07-26
Payer: MEDICARE

## 2022-07-26 LAB
BACTERIA #/AREA URNS AUTO: NORMAL /HPF
BILIRUB UR QL STRIP: NEGATIVE
CLARITY UR REFRACT.AUTO: ABNORMAL
COLOR UR AUTO: ABNORMAL
GLUCOSE UR QL STRIP: NEGATIVE
HGB UR QL STRIP: NEGATIVE
HYALINE CASTS UR QL AUTO: 0 /LPF
KETONES UR QL STRIP: ABNORMAL
LEUKOCYTE ESTERASE UR QL STRIP: NEGATIVE
MICROSCOPIC COMMENT: NORMAL
NITRITE UR QL STRIP: NEGATIVE
PH UR STRIP: 5 [PH] (ref 5–8)
PROT UR QL STRIP: ABNORMAL
RBC #/AREA URNS AUTO: 1 /HPF (ref 0–4)
SP GR UR STRIP: 1.03 (ref 1–1.03)
URN SPEC COLLECT METH UR: ABNORMAL
WBC #/AREA URNS AUTO: 5 /HPF (ref 0–5)

## 2022-07-26 NOTE — TELEPHONE ENCOUNTER
Patient states he had heart surgery 2 weeks ago (Trans Catheterization Aortic Valve Replacement). States after procedure he developed a fever that he is unable to break. States he spoke to his cardiologist (Dr. Gutierres at [place]) who advised him to reach out to pcp. Appointment scheduled for tomorrow 7-27-22. Patient agreed to appointment date, time, and location.

## 2022-07-26 NOTE — TELEPHONE ENCOUNTER
----- Message from Jasepr Becerra MA sent at 7/26/2022  4:01 PM CDT -----  Contact: RAMBO INMAN [[id number]]  Type: Needs Medical Advice    Who Called:RAMBO INMAN [[id number]]  Best Call Back Number: [phone]  Inquiry/Question: Please call RAMBO INMAN [[id number]] regarding concerns with fever , meds      Thank you~

## 2022-07-27 ENCOUNTER — HOSPITAL ENCOUNTER (OUTPATIENT)
Dept: RADIOLOGY | Facility: HOSPITAL | Age: 67
Discharge: HOME | End: 2022-07-27
Attending: FAMILY MEDICINE
Payer: MEDICARE

## 2022-07-27 ENCOUNTER — OFFICE VISIT (OUTPATIENT)
Dept: FAMILY MEDICINE | Facility: CLINIC | Age: 67
End: 2022-07-27
Payer: MEDICARE

## 2022-07-27 VITALS
WEIGHT: 209 LBS | BODY MASS INDEX: 30.96 KG/M2 | OXYGEN SATURATION: 98 % | HEIGHT: 69 IN | HEART RATE: 102 BPM | TEMPERATURE: 98 F | DIASTOLIC BLOOD PRESSURE: 60 MMHG | SYSTOLIC BLOOD PRESSURE: 98 MMHG

## 2022-07-27 DIAGNOSIS — R50.9 FEVER, UNSPECIFIED FEVER CAUSE: ICD-10-CM

## 2022-07-27 DIAGNOSIS — R50.9 FEVER, UNSPECIFIED FEVER CAUSE: Primary | ICD-10-CM

## 2022-07-27 DIAGNOSIS — R07.81 PLEURITIC CHEST PAIN: ICD-10-CM

## 2022-07-27 DIAGNOSIS — N39.0 URINARY TRACT INFECTION WITHOUT HEMATURIA, SITE UNSPECIFIED: ICD-10-CM

## 2022-07-27 DIAGNOSIS — I95.81 POSTPROCEDURAL HYPOTENSION: ICD-10-CM

## 2022-07-27 PROCEDURE — 4010F PR ACE/ARB THEARPY RXD/TAKEN: ICD-10-PCS | Mod: CPTII,S$GLB,, | Performed by: FAMILY MEDICINE

## 2022-07-27 PROCEDURE — 3074F PR MOST RECENT SYSTOLIC BLOOD PRESSURE < 130 MM HG: ICD-10-PCS | Mod: CPTII,S$GLB,, | Performed by: FAMILY MEDICINE

## 2022-07-27 PROCEDURE — 4010F ACE/ARB THERAPY RXD/TAKEN: CPT | Mod: CPTII,S$GLB,, | Performed by: FAMILY MEDICINE

## 2022-07-27 PROCEDURE — 1101F PR PT FALLS ASSESS DOC 0-1 FALLS W/OUT INJ PAST YR: ICD-10-PCS | Mod: CPTII,S$GLB,, | Performed by: FAMILY MEDICINE

## 2022-07-27 PROCEDURE — 3008F BODY MASS INDEX DOCD: CPT | Mod: CPTII,S$GLB,, | Performed by: FAMILY MEDICINE

## 2022-07-27 PROCEDURE — 1159F MED LIST DOCD IN RCRD: CPT | Mod: CPTII,S$GLB,, | Performed by: FAMILY MEDICINE

## 2022-07-27 PROCEDURE — 3008F PR BODY MASS INDEX (BMI) DOCUMENTED: ICD-10-PCS | Mod: CPTII,S$GLB,, | Performed by: FAMILY MEDICINE

## 2022-07-27 PROCEDURE — 1160F PR REVIEW ALL MEDS BY PRESCRIBER/CLIN PHARMACIST DOCUMENTED: ICD-10-PCS | Mod: CPTII,S$GLB,, | Performed by: FAMILY MEDICINE

## 2022-07-27 PROCEDURE — 71046 XR CHEST PA AND LATERAL: ICD-10-PCS | Mod: 26,,, | Performed by: RADIOLOGY

## 2022-07-27 PROCEDURE — 71046 X-RAY EXAM CHEST 2 VIEWS: CPT | Mod: 26,,, | Performed by: RADIOLOGY

## 2022-07-27 PROCEDURE — 3074F SYST BP LT 130 MM HG: CPT | Mod: CPTII,S$GLB,, | Performed by: FAMILY MEDICINE

## 2022-07-27 PROCEDURE — 1159F PR MEDICATION LIST DOCUMENTED IN MEDICAL RECORD: ICD-10-PCS | Mod: CPTII,S$GLB,, | Performed by: FAMILY MEDICINE

## 2022-07-27 PROCEDURE — 1160F RVW MEDS BY RX/DR IN RCRD: CPT | Mod: CPTII,S$GLB,, | Performed by: FAMILY MEDICINE

## 2022-07-27 PROCEDURE — 99999 PR PBB SHADOW E&M-EST. PATIENT-LVL IV: ICD-10-PCS | Mod: PBBFAC,,, | Performed by: FAMILY MEDICINE

## 2022-07-27 PROCEDURE — 99999 PR PBB SHADOW E&M-EST. PATIENT-LVL IV: CPT | Mod: PBBFAC,,, | Performed by: FAMILY MEDICINE

## 2022-07-27 PROCEDURE — 99214 OFFICE O/P EST MOD 30 MIN: CPT | Mod: S$GLB,,, | Performed by: FAMILY MEDICINE

## 2022-07-27 PROCEDURE — 3288F PR FALLS RISK ASSESSMENT DOCUMENTED: ICD-10-PCS | Mod: CPTII,S$GLB,, | Performed by: FAMILY MEDICINE

## 2022-07-27 PROCEDURE — 99214 PR OFFICE/OUTPT VISIT, EST, LEVL IV, 30-39 MIN: ICD-10-PCS | Mod: S$GLB,,, | Performed by: FAMILY MEDICINE

## 2022-07-27 PROCEDURE — 1101F PT FALLS ASSESS-DOCD LE1/YR: CPT | Mod: CPTII,S$GLB,, | Performed by: FAMILY MEDICINE

## 2022-07-27 PROCEDURE — 3288F FALL RISK ASSESSMENT DOCD: CPT | Mod: CPTII,S$GLB,, | Performed by: FAMILY MEDICINE

## 2022-07-27 PROCEDURE — 71046 X-RAY EXAM CHEST 2 VIEWS: CPT | Mod: TC,FY

## 2022-07-27 PROCEDURE — 3078F PR MOST RECENT DIASTOLIC BLOOD PRESSURE < 80 MM HG: ICD-10-PCS | Mod: CPTII,S$GLB,, | Performed by: FAMILY MEDICINE

## 2022-07-27 PROCEDURE — 3078F DIAST BP <80 MM HG: CPT | Mod: CPTII,S$GLB,, | Performed by: FAMILY MEDICINE

## 2022-07-27 RX ORDER — SULFAMETHOXAZOLE AND TRIMETHOPRIM 800; 160 MG/1; MG/1
1 TABLET ORAL 2 TIMES DAILY
Qty: 10 TABLET | Refills: 0 | Status: SHIPPED | OUTPATIENT
Start: 2022-07-27 | End: 2022-08-06

## 2022-07-27 RX ORDER — CLOPIDOGREL BISULFATE 75 MG/1
75 TABLET ORAL DAILY
COMMUNITY
Start: 2022-07-12 | End: 2023-06-02

## 2022-07-27 NOTE — PATIENT INSTRUCTIONS
Tomorrow, don't take any Benazepril. Then decrease dose to 1/2 tablet (20 mg) once a day.    Push fluids intake.  Drink plenty of water.   Goal is 64 oz a day.    Track BP at home.

## 2022-07-27 NOTE — PROGRESS NOTES
Subjective:       Patient ID: Jaswinder Cifuentes is a 67 y.o. male.    Chief Complaint: No chief complaint on file.    Known to me patient here for UC visit.  CC- Fever - onset approx 3-4 days after DC from a TAVR on 7/11 then a Pacemaker on 7/12 done at [place] per Dr Gutierres (Dr Olson is primary Cardiologist).  Tmax at end of last week - approx 7/22 and UA and Bactrim ordered.  Pt had a catheter for the procedurs and did see some hematuria after cath removed and he had some burning and cloudy orange urine before Rx w Bactrim.  UA not done until yesterday and he had 4 days of Bactrim by then.  No nausea or flank pain.  Yesterday T max was 99 and today 97.8 - on no fever reducing meds.    He is also gen weak, dizzy and light-headed.  Has had some low keily-central pleuritic pain that can radiate to his shoulders, and some SOB- NICHOLE.  His BP today is 98/60 and at home has been lower than his norm pre-procedure.     Review of Systems   Constitutional: Positive for fatigue and fever.   Respiratory: Positive for cough (veru occasional, clear sputum - baseline for him) and shortness of breath.    Cardiovascular: Negative for chest pain.   Gastrointestinal: Negative for abdominal pain, diarrhea, nausea and vomiting.   Genitourinary: Positive for dysuria. Negative for flank pain.   Skin: Negative for rash.   Neurological: Positive for dizziness. Negative for numbness.   All other systems reviewed and are negative.      Objective:      Physical Exam  Vitals reviewed.   Constitutional:       General: He is not in acute distress.     Appearance: He is well-developed.      Comments: Pale compared to past OV   HENT:      Mouth/Throat:      Mouth: Mucous membranes are moist.      Pharynx: Oropharynx is clear. No posterior oropharyngeal erythema.   Cardiovascular:      Rate and Rhythm: Normal rate and regular rhythm.      Heart sounds: Murmur heard.     No friction rub. No gallop.      Comments: HR 96 on exam  Pulmonary:       Effort: Pulmonary effort is normal.      Breath sounds: Normal breath sounds. No wheezing or rales.   Abdominal:      General: Bowel sounds are normal.      Palpations: Abdomen is soft.      Tenderness: There is no abdominal tenderness. There is no right CVA tenderness or left CVA tenderness.   Skin:     General: Skin is warm and dry.      Coloration: Skin is pale.   Neurological:      Mental Status: He is alert.   Psychiatric:         Mood and Affect: Mood normal.         Thought Content: Thought content normal.         Assessment:       1. Fever, unspecified fever cause    2. Postprocedural hypotension    3. Pleuritic chest pain    4. Urinary tract infection without hematuria, site unspecified        Plan:       Fever, unspecified fever cause  -     X-Ray Chest PA And Lateral; Future; Expected date: 07/27/2022  -     CBC W/ AUTO DIFFERENTIAL; Future; Expected date: 07/27/2022  -     COMPREHENSIVE METABOLIC PANEL; Future; Expected date: 07/27/2022  -     Urinalysis; Future; Expected date: 07/27/2022  -     CULTURE, BLOOD; Future; Expected date: 07/27/2022    Postprocedural hypotension  -     X-Ray Chest PA And Lateral; Future; Expected date: 07/27/2022  -     CBC W/ AUTO DIFFERENTIAL; Future; Expected date: 07/27/2022  -     COMPREHENSIVE METABOLIC PANEL; Future; Expected date: 07/27/2022  -     Urinalysis; Future; Expected date: 07/27/2022  -     CULTURE, BLOOD; Future; Expected date: 07/27/2022    Pleuritic chest pain  -     X-Ray Chest PA And Lateral; Future; Expected date: 07/27/2022  -     CBC W/ AUTO DIFFERENTIAL; Future; Expected date: 07/27/2022  -     COMPREHENSIVE METABOLIC PANEL; Future; Expected date: 07/27/2022  -     Urinalysis; Future; Expected date: 07/27/2022    Urinary tract infection without hematuria, site unspecified  -     sulfamethoxazole-trimethoprim 800-160mg (BACTRIM DS) 800-160 mg Tab; Take 1 tablet by mouth 2 (two) times daily. for 10 days  Dispense: 10 tablet; Refill: 0      Patient  Instructions   Tomorrow, don't take any Benazepril. Then decrease dose to 1/2 tablet (20 mg) once a day.    Push fluids intake.  Drink plenty of water.   Goal is 64 oz a day.    Track BP at home.

## 2022-08-01 ENCOUNTER — TELEPHONE (OUTPATIENT)
Dept: FAMILY MEDICINE | Facility: CLINIC | Age: 67
End: 2022-08-01
Payer: MEDICARE

## 2022-08-01 NOTE — TELEPHONE ENCOUNTER
----- Message from Td Lewis sent at 8/1/2022  9:36 AM CDT -----  Contact: RAMBO INMAN [[id number]]  Type: Patient Call Back    Who called:RAMBO INMAN [[id number]]    What is the request in detail: The patient would like to schedule hospital follow up    Can the clinic reply by [facility]?    Would the patient rather a call back or a response via [facility]?     Best call back number: [phone] (mobile)    Additional Information:

## 2022-08-01 NOTE — TELEPHONE ENCOUNTER
Patient states he was recently hospitalized at [facility]. Discharge date 7-30-22. Requesting hospital follow up appointment. 40 minute hospital follow up appointment scheduled for 8-2-22. Patient agreed to appointment date, time, and location.

## 2022-08-02 ENCOUNTER — OFFICE VISIT (OUTPATIENT)
Dept: FAMILY MEDICINE | Facility: CLINIC | Age: 67
End: 2022-08-02
Payer: MEDICARE

## 2022-08-02 VITALS
SYSTOLIC BLOOD PRESSURE: 116 MMHG | WEIGHT: 204.38 LBS | HEIGHT: 69 IN | HEART RATE: 80 BPM | DIASTOLIC BLOOD PRESSURE: 60 MMHG | BODY MASS INDEX: 30.27 KG/M2 | OXYGEN SATURATION: 96 % | RESPIRATION RATE: 16 BRPM | TEMPERATURE: 99 F

## 2022-08-02 DIAGNOSIS — Z95.2 S/P TAVR (TRANSCATHETER AORTIC VALVE REPLACEMENT): Primary | ICD-10-CM

## 2022-08-02 DIAGNOSIS — Z95.0 CARDIAC PACEMAKER IN SITU: ICD-10-CM

## 2022-08-02 DIAGNOSIS — D50.9 IRON DEFICIENCY ANEMIA, UNSPECIFIED IRON DEFICIENCY ANEMIA TYPE: ICD-10-CM

## 2022-08-02 DIAGNOSIS — I10 ESSENTIAL HYPERTENSION: ICD-10-CM

## 2022-08-02 DIAGNOSIS — E78.2 MIXED HYPERLIPIDEMIA: ICD-10-CM

## 2022-08-02 PROCEDURE — 3074F SYST BP LT 130 MM HG: CPT | Mod: CPTII,S$GLB,,

## 2022-08-02 PROCEDURE — 99999 PR PBB SHADOW E&M-EST. PATIENT-LVL V: ICD-10-PCS | Mod: PBBFAC,,,

## 2022-08-02 PROCEDURE — 99214 PR OFFICE/OUTPT VISIT, EST, LEVL IV, 30-39 MIN: ICD-10-PCS | Mod: S$GLB,,,

## 2022-08-02 PROCEDURE — 3008F PR BODY MASS INDEX (BMI) DOCUMENTED: ICD-10-PCS | Mod: CPTII,S$GLB,,

## 2022-08-02 PROCEDURE — 1160F PR REVIEW ALL MEDS BY PRESCRIBER/CLIN PHARMACIST DOCUMENTED: ICD-10-PCS | Mod: CPTII,S$GLB,,

## 2022-08-02 PROCEDURE — 1159F MED LIST DOCD IN RCRD: CPT | Mod: CPTII,S$GLB,,

## 2022-08-02 PROCEDURE — 99999 PR PBB SHADOW E&M-EST. PATIENT-LVL V: CPT | Mod: PBBFAC,,,

## 2022-08-02 PROCEDURE — 3074F PR MOST RECENT SYSTOLIC BLOOD PRESSURE < 130 MM HG: ICD-10-PCS | Mod: CPTII,S$GLB,,

## 2022-08-02 PROCEDURE — 1101F PT FALLS ASSESS-DOCD LE1/YR: CPT | Mod: CPTII,S$GLB,,

## 2022-08-02 PROCEDURE — 3078F DIAST BP <80 MM HG: CPT | Mod: CPTII,S$GLB,,

## 2022-08-02 PROCEDURE — 1101F PR PT FALLS ASSESS DOC 0-1 FALLS W/OUT INJ PAST YR: ICD-10-PCS | Mod: CPTII,S$GLB,,

## 2022-08-02 PROCEDURE — 3078F PR MOST RECENT DIASTOLIC BLOOD PRESSURE < 80 MM HG: ICD-10-PCS | Mod: CPTII,S$GLB,,

## 2022-08-02 PROCEDURE — 3008F BODY MASS INDEX DOCD: CPT | Mod: CPTII,S$GLB,,

## 2022-08-02 PROCEDURE — 3288F PR FALLS RISK ASSESSMENT DOCUMENTED: ICD-10-PCS | Mod: CPTII,S$GLB,,

## 2022-08-02 PROCEDURE — 99214 OFFICE O/P EST MOD 30 MIN: CPT | Mod: S$GLB,,,

## 2022-08-02 PROCEDURE — 3288F FALL RISK ASSESSMENT DOCD: CPT | Mod: CPTII,S$GLB,,

## 2022-08-02 PROCEDURE — 1126F PR PAIN SEVERITY QUANTIFIED, NO PAIN PRESENT: ICD-10-PCS | Mod: CPTII,S$GLB,,

## 2022-08-02 PROCEDURE — 1159F PR MEDICATION LIST DOCUMENTED IN MEDICAL RECORD: ICD-10-PCS | Mod: CPTII,S$GLB,,

## 2022-08-02 PROCEDURE — 1160F RVW MEDS BY RX/DR IN RCRD: CPT | Mod: CPTII,S$GLB,,

## 2022-08-02 PROCEDURE — 4010F PR ACE/ARB THEARPY RXD/TAKEN: ICD-10-PCS | Mod: CPTII,S$GLB,,

## 2022-08-02 PROCEDURE — 4010F ACE/ARB THERAPY RXD/TAKEN: CPT | Mod: CPTII,S$GLB,,

## 2022-08-02 PROCEDURE — 1126F AMNT PAIN NOTED NONE PRSNT: CPT | Mod: CPTII,S$GLB,,

## 2022-08-02 RX ORDER — FERROUS SULFATE 325(65) MG
325 TABLET ORAL
Status: ON HOLD | COMMUNITY
End: 2023-08-08

## 2022-08-02 RX ORDER — COLCHICINE 0.6 MG/1
0.6 TABLET ORAL DAILY
Status: ON HOLD | COMMUNITY
End: 2023-08-08

## 2022-08-02 NOTE — PROGRESS NOTES
Subjective:       Patient ID: Jaswinder Cifuentes is a 67 y.o. male.    Chief Complaint: Hospital Follow Up ([facility] 07/27/22-07/30/22 low iron, fever and chest pain)    Gumaro Cifuentes is a 68 yo M pt w/ MHx listed below that present to the clinic for hospital follow up. Hospital presentation and course can be seen scanned into patient's chart. Found to have significant anemia almost requiring transfusion. Provided IV iron and had improvement of his symptoms. Does admit to still feeling fatigued but improved from previous. Does admit to having epigastric abdominal pain. No CP. Has follow up with GI scheduled for 08/22 w/ Dr. Paiz. Currently holding clopidogrel. Follow up w/ Dr. Olson cardiology scheduled for next week. Has repeat labs and echo scheduled for tomorrow.       Past Medical History:   Diagnosis Date    Aortic stenosis     Atrial fibrillation     BPH with obstruction/lower urinary tract symptoms     Diabetes mellitus, type 2     Hyperlipidemia     Hypertension     Seropositive rheumatoid arthritis     Sleep apnea     does not using CPAP       Review of patient's allergies indicates:  No Known Allergies      Current Outpatient Medications:     acetaminophen (TYLENOL) 500 MG tablet, Take 500 mg by mouth every 6 (six) hours as needed for Pain., Disp: , Rfl:     apixaban (ELIQUIS) 5 mg Tab, Take 5 mg by mouth 2 (two) times daily., Disp: , Rfl:     benazepriL (LOTENSIN) 40 MG tablet, Take 40 mg by mouth once daily., Disp: , Rfl:     blood sugar diagnostic Strp, To check BG 3 times daily, to use with insurance preferred meter, Disp: 90 strip, Rfl: 3    colchicine (COLCRYS) 0.6 mg tablet, Take 0.6 mg by mouth once daily., Disp: , Rfl:     cyanocobalamin (VITAMIN B-12) 1000 MCG tablet, Take 100 mcg by mouth once daily., Disp: , Rfl:     ferrous sulfate (FEOSOL) 325 mg (65 mg iron) Tab tablet, Take 325 mg by mouth daily with breakfast., Disp: , Rfl:     leflunomide (ARAVA) 20 MG Tab,  Take 1 tablet (20 mg total) by mouth once daily., Disp: 30 tablet, Rfl: 5    metFORMIN (GLUCOPHAGE-XR) 500 MG ER 24hr tablet, TAKE 1 TABLET(500 MG) BY MOUTH DAILY WITH BREAKFAST, Disp: 90 tablet, Rfl: 2    metoprolol succinate (TOPROL-XL) 50 MG 24 hr tablet, Take 1 tablet (50 mg total) by mouth once daily. (Patient taking differently: Take 50 mg by mouth 2 (two) times daily.), Disp: 30 tablet, Rfl: 11    omega-3 fatty acids/fish oil (FISH OIL-OMEGA-3 FATTY ACIDS) 300-1,000 mg capsule, Take by mouth once daily., Disp: , Rfl:     omeprazole (PRILOSEC) 20 MG capsule, TAKE 1 CAPSULE(20 MG) BY MOUTH EVERY DAY, Disp: 90 capsule, Rfl: 1    pyridoxine, vitamin B6, (B-6) 25 MG Tab, Take 25 mg by mouth once daily., Disp: , Rfl:     rosuvastatin (CRESTOR) 10 MG tablet, Take 1 tablet (10 mg total) by mouth once daily., Disp: 90 tablet, Rfl: 3    sulfamethoxazole-trimethoprim 800-160mg (BACTRIM DS) 800-160 mg Tab, Take 1 tablet by mouth 2 (two) times daily. for 10 days, Disp: 10 tablet, Rfl: 0    tamsulosin (FLOMAX) 0.4 mg Cap, Take 1 capsule (0.4 mg total) by mouth every evening., Disp: 30 capsule, Rfl: 11    vitamin D (VITAMIN D3) 1000 units Tab, Take 1,000 Units by mouth once daily., Disp: , Rfl:     clopidogreL (PLAVIX) 75 mg tablet, Take 75 mg by mouth once daily., Disp: , Rfl:     Review of Systems   Constitutional: Positive for fatigue. Negative for activity change, appetite change, chills, diaphoresis, fever and unexpected weight change.   HENT: Negative for congestion, ear pain, postnasal drip, rhinorrhea, sinus pressure, sneezing, sore throat and trouble swallowing.    Eyes: Negative for pain, itching and visual disturbance.   Respiratory: Negative for cough, chest tightness, shortness of breath and wheezing.    Cardiovascular: Negative for chest pain, palpitations and leg swelling.   Gastrointestinal: Positive for abdominal pain. Negative for abdominal distention, blood in stool, constipation, diarrhea,  "nausea and vomiting.        Black stools     Endocrine: Negative for cold intolerance and heat intolerance.   Genitourinary: Negative for difficulty urinating, dysuria, frequency, hematuria and urgency.   Musculoskeletal: Negative for arthralgias, back pain, myalgias and neck pain.   Skin: Negative for color change, pallor, rash and wound.   Neurological: Negative for dizziness, syncope, weakness, numbness and headaches.   Hematological: Negative for adenopathy.   Psychiatric/Behavioral: Negative for behavioral problems. The patient is not nervous/anxious.        Objective:      /60 (BP Location: Right arm, Patient Position: Sitting, BP Method: Medium (Manual))   Pulse 80   Temp 98.7 °F (37.1 °C) (Oral)   Resp 16   Ht 5' 9" (1.753 m)   Wt 92.7 kg (204 lb 5.9 oz)   SpO2 96%   BMI 30.18 kg/m²   Physical Exam  Vitals reviewed.   Constitutional:       General: He is not in acute distress.     Appearance: Normal appearance. He is obese. He is not ill-appearing, toxic-appearing or diaphoretic.   HENT:      Head: Normocephalic.      Right Ear: External ear normal.      Left Ear: External ear normal.      Nose: Nose normal. No congestion or rhinorrhea.      Mouth/Throat:      Mouth: Mucous membranes are moist.      Pharynx: Oropharynx is clear.   Eyes:      General: No scleral icterus.        Right eye: No discharge.         Left eye: No discharge.      Extraocular Movements: Extraocular movements intact.      Conjunctiva/sclera: Conjunctivae normal.   Cardiovascular:      Rate and Rhythm: Normal rate and regular rhythm.      Pulses: Normal pulses.      Heart sounds: Murmur heard.     No friction rub. No gallop.   Pulmonary:      Effort: Pulmonary effort is normal. No respiratory distress.      Breath sounds: Normal breath sounds. No wheezing, rhonchi or rales.   Chest:      Chest wall: No tenderness.   Musculoskeletal:         General: No swelling, tenderness or deformity. Normal range of motion.      Cervical " back: Normal range of motion.      Right lower leg: No edema.      Left lower leg: No edema.   Skin:     General: Skin is warm and dry.      Capillary Refill: Capillary refill takes less than 2 seconds.      Coloration: Skin is not jaundiced.      Findings: No bruising, erythema, lesion or rash.   Neurological:      Mental Status: He is alert and oriented to person, place, and time.      Gait: Gait normal.   Psychiatric:         Mood and Affect: Mood normal.         Behavior: Behavior normal.         Thought Content: Thought content normal.         Judgment: Judgment normal.         Assessment:       1. S/P TAVR (transcatheter aortic valve replacement)    2. Iron deficiency anemia, unspecified iron deficiency anemia type    3. Cardiac pacemaker in situ    4. Essential hypertension    5. Mixed hyperlipidemia        Plan:       S/P TAVR (transcatheter aortic valve replacement)       -    Follow up w/ Cardiology as scheduled.    Iron deficiency anemia, unspecified iron deficiency anemia type       -    Labs tomorrow. Follow up w/ GI as scheduled.     Cardiac pacemaker in situ       -    Follow up w/ cardiology as scheduled.    Essential hypertension        -   Stable. Continue meds as prescribed. Will continue to monitor.      Mixed hyperlipidemia         -   Will continue to monitor.           Pt to follow up for annual w/ Dr. Fernández in June 2023.        Servando Tian PA-C  Family Medicine Physician Assistant       I spent a total of 20 minutes on the day of the visit.This includes face to face time and non-face to face time preparing to see the patient (eg, review of tests), obtaining and/or reviewing separately obtained history, documenting clinical information in the electronic or other health record, independently interpreting results and communicating results to the patient/family/caregiver, or care coordinator.      We have addressed [4] Moderate: 1 or more chronic illnesses with exacerbation, progression,  or side effects of treatment / 2 or more stable chronic illnesses / 1 undiagnosed new problem with uncertain prognosis / 1 acute illness with systemic symptoms / 1 acute complicated injury  The complexity of the data reviewed and analyzed for this visit was [2] Minimal or None  The risk of complications and/or morbidity or mortality are [4] Moderate risk (I.e. prescription drug management / decision regarding minor surgery with identified pt or procedure risk factors / decision regarding elective major surgery without identified pt or procedure risk factors / diagnosis or treatment significantly limited by social determinants of health)   The level of Medical Decision Making for this visit is [4] Moderate

## 2022-08-02 NOTE — PATIENT INSTRUCTIONS
Terry San,     If you are due for any health screening(s) below please notify me so we can arrange them to be ordered and scheduled to maintain your health. Most healthy patients complete it. Don't lose out on improving your health.     Tests to Keep You Healthy    Colon Cancer Screening: Met

## 2022-11-28 ENCOUNTER — PATIENT MESSAGE (OUTPATIENT)
Dept: RHEUMATOLOGY | Facility: CLINIC | Age: 67
End: 2022-11-28
Payer: MEDICARE

## 2022-11-28 DIAGNOSIS — M05.79 SEROPOSITIVE RHEUMATOID ARTHRITIS OF MULTIPLE SITES: ICD-10-CM

## 2022-11-28 RX ORDER — LEFLUNOMIDE 20 MG/1
20 TABLET ORAL DAILY
Qty: 30 TABLET | Refills: 1 | Status: SHIPPED | OUTPATIENT
Start: 2022-11-28 | End: 2023-01-31 | Stop reason: SDUPTHER

## 2022-12-09 ENCOUNTER — LAB VISIT (OUTPATIENT)
Dept: LAB | Facility: HOSPITAL | Age: 67
End: 2022-12-09
Attending: UROLOGY
Payer: MEDICARE

## 2022-12-09 DIAGNOSIS — N42.32 ATYPICAL SMALL ACINAR PROLIFERATION OF PROSTATE: ICD-10-CM

## 2022-12-09 DIAGNOSIS — R97.20 ELEVATED PSA: ICD-10-CM

## 2022-12-09 PROCEDURE — 36415 COLL VENOUS BLD VENIPUNCTURE: CPT | Mod: PO | Performed by: UROLOGY

## 2022-12-09 PROCEDURE — 84153 ASSAY OF PSA TOTAL: CPT | Performed by: UROLOGY

## 2022-12-09 PROCEDURE — 84154 ASSAY OF PSA FREE: CPT | Performed by: UROLOGY

## 2022-12-12 LAB
PROSTATE SPECIFIC ANTIGEN, TOTAL: 2.7 NG/ML (ref 0–4)
PSA FREE MFR SERPL: 14.44 %
PSA FREE SERPL-MCNC: 0.39 NG/ML (ref 0–1.5)

## 2023-01-13 ENCOUNTER — PATIENT OUTREACH (OUTPATIENT)
Dept: ADMINISTRATIVE | Facility: HOSPITAL | Age: 68
End: 2023-01-13
Payer: MEDICARE

## 2023-01-13 NOTE — PROGRESS NOTES
Medication Review Post Discharge (MRP)  Percentage of discharges from Jan. 1 - Dec. 1 of the current measurement year for patients 18 years old and older for whom medications were reconciled the date of discharge through 30 days after discharge (31 total days)       DOCUMENTATION CRITERIA  NOTED      Medication reconciliation conducted by a prescribing practitioner, clinical pharmacist or registered nurse (RN) on the patient's date of discharge through 30 days after discharge (31 total days)

## 2023-01-23 ENCOUNTER — LAB VISIT (OUTPATIENT)
Dept: LAB | Facility: HOSPITAL | Age: 68
End: 2023-01-23
Attending: INTERNAL MEDICINE
Payer: MEDICARE

## 2023-01-23 DIAGNOSIS — M05.79 SEROPOSITIVE RHEUMATOID ARTHRITIS OF MULTIPLE SITES: ICD-10-CM

## 2023-01-23 LAB
CRP SERPL-MCNC: 4.9 MG/L (ref 0–8.2)
ERYTHROCYTE [SEDIMENTATION RATE] IN BLOOD BY WESTERGREN METHOD: 35 MM/HR (ref 0–10)

## 2023-01-23 PROCEDURE — 86140 C-REACTIVE PROTEIN: CPT | Performed by: INTERNAL MEDICINE

## 2023-01-23 PROCEDURE — 85651 RBC SED RATE NONAUTOMATED: CPT | Mod: PO | Performed by: INTERNAL MEDICINE

## 2023-01-23 PROCEDURE — 36415 COLL VENOUS BLD VENIPUNCTURE: CPT | Mod: PO | Performed by: INTERNAL MEDICINE

## 2023-01-31 ENCOUNTER — OFFICE VISIT (OUTPATIENT)
Dept: RHEUMATOLOGY | Facility: CLINIC | Age: 68
End: 2023-01-31
Payer: MEDICARE

## 2023-01-31 VITALS
HEART RATE: 69 BPM | BODY MASS INDEX: 34.51 KG/M2 | OXYGEN SATURATION: 97 % | WEIGHT: 233.69 LBS | SYSTOLIC BLOOD PRESSURE: 123 MMHG | DIASTOLIC BLOOD PRESSURE: 79 MMHG

## 2023-01-31 DIAGNOSIS — M05.79 SEROPOSITIVE RHEUMATOID ARTHRITIS OF MULTIPLE SITES: Primary | ICD-10-CM

## 2023-01-31 DIAGNOSIS — Z79.899 LONG-TERM USE OF HIGH-RISK MEDICATION: ICD-10-CM

## 2023-01-31 PROCEDURE — 1101F PR PT FALLS ASSESS DOC 0-1 FALLS W/OUT INJ PAST YR: ICD-10-PCS | Mod: CPTII,S$GLB,, | Performed by: INTERNAL MEDICINE

## 2023-01-31 PROCEDURE — 3008F PR BODY MASS INDEX (BMI) DOCUMENTED: ICD-10-PCS | Mod: CPTII,S$GLB,, | Performed by: INTERNAL MEDICINE

## 2023-01-31 PROCEDURE — 99999 PR PBB SHADOW E&M-EST. PATIENT-LVL III: ICD-10-PCS | Mod: PBBFAC,,, | Performed by: INTERNAL MEDICINE

## 2023-01-31 PROCEDURE — 3078F PR MOST RECENT DIASTOLIC BLOOD PRESSURE < 80 MM HG: ICD-10-PCS | Mod: CPTII,S$GLB,, | Performed by: INTERNAL MEDICINE

## 2023-01-31 PROCEDURE — 99213 PR OFFICE/OUTPT VISIT, EST, LEVL III, 20-29 MIN: ICD-10-PCS | Mod: S$GLB,,, | Performed by: INTERNAL MEDICINE

## 2023-01-31 PROCEDURE — 3288F FALL RISK ASSESSMENT DOCD: CPT | Mod: CPTII,S$GLB,, | Performed by: INTERNAL MEDICINE

## 2023-01-31 PROCEDURE — 1101F PT FALLS ASSESS-DOCD LE1/YR: CPT | Mod: CPTII,S$GLB,, | Performed by: INTERNAL MEDICINE

## 2023-01-31 PROCEDURE — 3078F DIAST BP <80 MM HG: CPT | Mod: CPTII,S$GLB,, | Performed by: INTERNAL MEDICINE

## 2023-01-31 PROCEDURE — 3074F PR MOST RECENT SYSTOLIC BLOOD PRESSURE < 130 MM HG: ICD-10-PCS | Mod: CPTII,S$GLB,, | Performed by: INTERNAL MEDICINE

## 2023-01-31 PROCEDURE — 99213 OFFICE O/P EST LOW 20 MIN: CPT | Mod: S$GLB,,, | Performed by: INTERNAL MEDICINE

## 2023-01-31 PROCEDURE — 99999 PR PBB SHADOW E&M-EST. PATIENT-LVL III: CPT | Mod: PBBFAC,,, | Performed by: INTERNAL MEDICINE

## 2023-01-31 PROCEDURE — 3288F PR FALLS RISK ASSESSMENT DOCUMENTED: ICD-10-PCS | Mod: CPTII,S$GLB,, | Performed by: INTERNAL MEDICINE

## 2023-01-31 PROCEDURE — 1160F RVW MEDS BY RX/DR IN RCRD: CPT | Mod: CPTII,S$GLB,, | Performed by: INTERNAL MEDICINE

## 2023-01-31 PROCEDURE — 1159F MED LIST DOCD IN RCRD: CPT | Mod: CPTII,S$GLB,, | Performed by: INTERNAL MEDICINE

## 2023-01-31 PROCEDURE — 1160F PR REVIEW ALL MEDS BY PRESCRIBER/CLIN PHARMACIST DOCUMENTED: ICD-10-PCS | Mod: CPTII,S$GLB,, | Performed by: INTERNAL MEDICINE

## 2023-01-31 PROCEDURE — 3074F SYST BP LT 130 MM HG: CPT | Mod: CPTII,S$GLB,, | Performed by: INTERNAL MEDICINE

## 2023-01-31 PROCEDURE — 3008F BODY MASS INDEX DOCD: CPT | Mod: CPTII,S$GLB,, | Performed by: INTERNAL MEDICINE

## 2023-01-31 PROCEDURE — 1159F PR MEDICATION LIST DOCUMENTED IN MEDICAL RECORD: ICD-10-PCS | Mod: CPTII,S$GLB,, | Performed by: INTERNAL MEDICINE

## 2023-01-31 RX ORDER — LEFLUNOMIDE 20 MG/1
20 TABLET ORAL DAILY
Qty: 90 TABLET | Refills: 1 | Status: SHIPPED | OUTPATIENT
Start: 2023-01-31 | End: 2023-07-26 | Stop reason: SDUPTHER

## 2023-01-31 NOTE — PROGRESS NOTES
History of present illness:  67-year-old gentleman with hypertension, diabetes, and BPH.  I have been following him for 12 years for seropositive rheumatoid arthritis.  He has no evidence of erosive disease.  He has been on leflunomide since 2014.  His arthritis has been stable since that time.  He has had no recent joint swelling.   He has had no morning stiffness.  He is tolerating the medication.  He has had no fever or infections.  He was last seen 1 year ago.      Since his last visit his arthritis has generally been stable.  He had a prostate biopsy because of his elevated PSA.  The biopsy was benign.  He then had a TAVR and pacemaker.  He is feeling better since he had the procedures.  He developed increased arthritic pain while he was on antibiotics after the surgery but the pain resolved.  He then developed anemia.  He was seen by GI.  He had negative endoscopy.  He was placed on iron in the anemia resolved.    Physical examination:  Musculoskeletal:  He has full range of motion of all joints.  He has no synovitis.  He has no tenderness to palpation.  Laboratory: His ESR has improved to 35.  CRP remains normal.      Assessment:  Stable rheumatoid arthritis     Plans:  1.  Continue leflunomide  2.  Laboratory studies in 6 months  3. Follow-up in 12 months with repeat laboratory studies

## 2023-04-05 ENCOUNTER — LAB VISIT (OUTPATIENT)
Dept: LAB | Facility: HOSPITAL | Age: 68
End: 2023-04-05
Attending: INTERNAL MEDICINE
Payer: MEDICARE

## 2023-04-05 DIAGNOSIS — M05.79 SEROPOSITIVE RHEUMATOID ARTHRITIS OF MULTIPLE SITES: ICD-10-CM

## 2023-04-05 DIAGNOSIS — Z79.899 LONG-TERM USE OF HIGH-RISK MEDICATION: ICD-10-CM

## 2023-04-05 LAB
ALBUMIN SERPL BCP-MCNC: 3.5 G/DL (ref 3.5–5.2)
ALP SERPL-CCNC: 94 U/L (ref 55–135)
ALT SERPL W/O P-5'-P-CCNC: 21 U/L (ref 10–44)
ANION GAP SERPL CALC-SCNC: 8 MMOL/L (ref 8–16)
AST SERPL-CCNC: 22 U/L (ref 10–40)
BASOPHILS # BLD AUTO: 0.04 K/UL (ref 0–0.2)
BASOPHILS NFR BLD: 0.5 % (ref 0–1.9)
BILIRUB SERPL-MCNC: 0.4 MG/DL (ref 0.1–1)
BUN SERPL-MCNC: 18 MG/DL (ref 8–23)
CALCIUM SERPL-MCNC: 9.5 MG/DL (ref 8.7–10.5)
CHLORIDE SERPL-SCNC: 104 MMOL/L (ref 95–110)
CO2 SERPL-SCNC: 27 MMOL/L (ref 23–29)
CREAT SERPL-MCNC: 0.9 MG/DL (ref 0.5–1.4)
CRP SERPL-MCNC: 15.1 MG/L (ref 0–8.2)
DIFFERENTIAL METHOD: ABNORMAL
EOSINOPHIL # BLD AUTO: 0.2 K/UL (ref 0–0.5)
EOSINOPHIL NFR BLD: 2.1 % (ref 0–8)
ERYTHROCYTE [DISTWIDTH] IN BLOOD BY AUTOMATED COUNT: 12.8 % (ref 11.5–14.5)
ERYTHROCYTE [SEDIMENTATION RATE] IN BLOOD BY WESTERGREN METHOD: 85 MM/HR (ref 0–10)
EST. GFR  (NO RACE VARIABLE): >60 ML/MIN/1.73 M^2
GLUCOSE SERPL-MCNC: 98 MG/DL (ref 70–110)
HCT VFR BLD AUTO: 41.9 % (ref 40–54)
HGB BLD-MCNC: 13.5 G/DL (ref 14–18)
IMM GRANULOCYTES # BLD AUTO: 0.03 K/UL (ref 0–0.04)
IMM GRANULOCYTES NFR BLD AUTO: 0.4 % (ref 0–0.5)
LYMPHOCYTES # BLD AUTO: 1.6 K/UL (ref 1–4.8)
LYMPHOCYTES NFR BLD: 22.5 % (ref 18–48)
MCH RBC QN AUTO: 29 PG (ref 27–31)
MCHC RBC AUTO-ENTMCNC: 32.2 G/DL (ref 32–36)
MCV RBC AUTO: 90 FL (ref 82–98)
MONOCYTES # BLD AUTO: 1 K/UL (ref 0.3–1)
MONOCYTES NFR BLD: 13.2 % (ref 4–15)
NEUTROPHILS # BLD AUTO: 4.5 K/UL (ref 1.8–7.7)
NEUTROPHILS NFR BLD: 61.3 % (ref 38–73)
NRBC BLD-RTO: 0 /100 WBC
PLATELET # BLD AUTO: 201 K/UL (ref 150–450)
PMV BLD AUTO: 11.4 FL (ref 9.2–12.9)
POTASSIUM SERPL-SCNC: 4.4 MMOL/L (ref 3.5–5.1)
PROT SERPL-MCNC: 7 G/DL (ref 6–8.4)
RBC # BLD AUTO: 4.66 M/UL (ref 4.6–6.2)
SODIUM SERPL-SCNC: 139 MMOL/L (ref 136–145)
WBC # BLD AUTO: 7.28 K/UL (ref 3.9–12.7)

## 2023-04-05 PROCEDURE — 85025 COMPLETE CBC W/AUTO DIFF WBC: CPT | Performed by: INTERNAL MEDICINE

## 2023-04-05 PROCEDURE — 85651 RBC SED RATE NONAUTOMATED: CPT | Mod: PO | Performed by: INTERNAL MEDICINE

## 2023-04-05 PROCEDURE — 86140 C-REACTIVE PROTEIN: CPT | Performed by: INTERNAL MEDICINE

## 2023-04-05 PROCEDURE — 36415 COLL VENOUS BLD VENIPUNCTURE: CPT | Mod: PO | Performed by: INTERNAL MEDICINE

## 2023-04-05 PROCEDURE — 80053 COMPREHEN METABOLIC PANEL: CPT | Performed by: INTERNAL MEDICINE

## 2023-06-02 ENCOUNTER — OFFICE VISIT (OUTPATIENT)
Dept: FAMILY MEDICINE | Facility: CLINIC | Age: 68
End: 2023-06-02
Payer: MEDICARE

## 2023-06-02 ENCOUNTER — LAB VISIT (OUTPATIENT)
Dept: LAB | Facility: HOSPITAL | Age: 68
End: 2023-06-02
Attending: STUDENT IN AN ORGANIZED HEALTH CARE EDUCATION/TRAINING PROGRAM
Payer: MEDICARE

## 2023-06-02 VITALS
DIASTOLIC BLOOD PRESSURE: 74 MMHG | RESPIRATION RATE: 16 BRPM | BODY MASS INDEX: 33.99 KG/M2 | HEIGHT: 69 IN | HEART RATE: 62 BPM | WEIGHT: 229.5 LBS | SYSTOLIC BLOOD PRESSURE: 128 MMHG | OXYGEN SATURATION: 96 % | TEMPERATURE: 98 F

## 2023-06-02 DIAGNOSIS — E78.2 MIXED HYPERLIPIDEMIA: ICD-10-CM

## 2023-06-02 DIAGNOSIS — I10 GOOD HYPERTENSION CONTROL: ICD-10-CM

## 2023-06-02 DIAGNOSIS — R73.03 PRE-DIABETES: ICD-10-CM

## 2023-06-02 DIAGNOSIS — Z00.00 ROUTINE GENERAL MEDICAL EXAMINATION AT A HEALTH CARE FACILITY: Primary | ICD-10-CM

## 2023-06-02 DIAGNOSIS — D84.821 DRUG-INDUCED IMMUNODEFICIENCY: ICD-10-CM

## 2023-06-02 DIAGNOSIS — B35.9 RINGWORM: ICD-10-CM

## 2023-06-02 DIAGNOSIS — R73.03 PREDIABETES: ICD-10-CM

## 2023-06-02 DIAGNOSIS — D50.9 IRON DEFICIENCY ANEMIA, UNSPECIFIED IRON DEFICIENCY ANEMIA TYPE: ICD-10-CM

## 2023-06-02 DIAGNOSIS — Z79.899 DRUG-INDUCED IMMUNODEFICIENCY: ICD-10-CM

## 2023-06-02 LAB
ALBUMIN SERPL BCP-MCNC: 3.8 G/DL (ref 3.5–5.2)
ALP SERPL-CCNC: 107 U/L (ref 55–135)
ALT SERPL W/O P-5'-P-CCNC: 32 U/L (ref 10–44)
ANION GAP SERPL CALC-SCNC: 11 MMOL/L (ref 8–16)
AST SERPL-CCNC: 32 U/L (ref 10–40)
BASOPHILS # BLD AUTO: 0.06 K/UL (ref 0–0.2)
BASOPHILS NFR BLD: 0.8 % (ref 0–1.9)
BILIRUB SERPL-MCNC: 0.5 MG/DL (ref 0.1–1)
BUN SERPL-MCNC: 18 MG/DL (ref 8–23)
CALCIUM SERPL-MCNC: 9.5 MG/DL (ref 8.7–10.5)
CHLORIDE SERPL-SCNC: 105 MMOL/L (ref 95–110)
CHOLEST SERPL-MCNC: 151 MG/DL (ref 120–199)
CHOLEST/HDLC SERPL: 4.9 {RATIO} (ref 2–5)
CO2 SERPL-SCNC: 22 MMOL/L (ref 23–29)
CREAT SERPL-MCNC: 1 MG/DL (ref 0.5–1.4)
DIFFERENTIAL METHOD: ABNORMAL
EOSINOPHIL # BLD AUTO: 0.2 K/UL (ref 0–0.5)
EOSINOPHIL NFR BLD: 2.5 % (ref 0–8)
ERYTHROCYTE [DISTWIDTH] IN BLOOD BY AUTOMATED COUNT: 14.1 % (ref 11.5–14.5)
EST. GFR  (NO RACE VARIABLE): >60 ML/MIN/1.73 M^2
ESTIMATED AVG GLUCOSE: 111 MG/DL (ref 68–131)
FERRITIN SERPL-MCNC: 389 NG/ML (ref 20–300)
GLUCOSE SERPL-MCNC: 99 MG/DL (ref 70–110)
HBA1C MFR BLD: 5.5 % (ref 4–5.6)
HCT VFR BLD AUTO: 43.2 % (ref 40–54)
HDLC SERPL-MCNC: 31 MG/DL (ref 40–75)
HDLC SERPL: 20.5 % (ref 20–50)
HGB BLD-MCNC: 13.8 G/DL (ref 14–18)
IMM GRANULOCYTES # BLD AUTO: 0.03 K/UL (ref 0–0.04)
IMM GRANULOCYTES NFR BLD AUTO: 0.4 % (ref 0–0.5)
IRON SERPL-MCNC: 79 UG/DL (ref 45–160)
LDLC SERPL CALC-MCNC: 102 MG/DL (ref 63–159)
LYMPHOCYTES # BLD AUTO: 1.7 K/UL (ref 1–4.8)
LYMPHOCYTES NFR BLD: 22.3 % (ref 18–48)
MCH RBC QN AUTO: 29.2 PG (ref 27–31)
MCHC RBC AUTO-ENTMCNC: 31.9 G/DL (ref 32–36)
MCV RBC AUTO: 92 FL (ref 82–98)
MONOCYTES # BLD AUTO: 1.1 K/UL (ref 0.3–1)
MONOCYTES NFR BLD: 14.3 % (ref 4–15)
NEUTROPHILS # BLD AUTO: 4.5 K/UL (ref 1.8–7.7)
NEUTROPHILS NFR BLD: 59.7 % (ref 38–73)
NONHDLC SERPL-MCNC: 120 MG/DL
NRBC BLD-RTO: 0 /100 WBC
PATH REV BLD -IMP: NORMAL
PLATELET # BLD AUTO: 173 K/UL (ref 150–450)
PMV BLD AUTO: 11.9 FL (ref 9.2–12.9)
POTASSIUM SERPL-SCNC: 4.8 MMOL/L (ref 3.5–5.1)
PROT SERPL-MCNC: 7.7 G/DL (ref 6–8.4)
RBC # BLD AUTO: 4.72 M/UL (ref 4.6–6.2)
RETICS/RBC NFR AUTO: 2.2 % (ref 0.4–2)
SATURATED IRON: 21 % (ref 20–50)
SODIUM SERPL-SCNC: 138 MMOL/L (ref 136–145)
TOTAL IRON BINDING CAPACITY: 373 UG/DL (ref 250–450)
TRANSFERRIN SERPL-MCNC: 252 MG/DL (ref 200–375)
TRIGL SERPL-MCNC: 90 MG/DL (ref 30–150)
TSH SERPL DL<=0.005 MIU/L-ACNC: 3.6 UIU/ML (ref 0.4–4)
WBC # BLD AUTO: 7.55 K/UL (ref 3.9–12.7)

## 2023-06-02 PROCEDURE — 85025 COMPLETE CBC W/AUTO DIFF WBC: CPT | Performed by: STUDENT IN AN ORGANIZED HEALTH CARE EDUCATION/TRAINING PROGRAM

## 2023-06-02 PROCEDURE — 82728 ASSAY OF FERRITIN: CPT | Performed by: STUDENT IN AN ORGANIZED HEALTH CARE EDUCATION/TRAINING PROGRAM

## 2023-06-02 PROCEDURE — 99999 PR PBB SHADOW E&M-EST. PATIENT-LVL IV: CPT | Mod: PBBFAC,,, | Performed by: STUDENT IN AN ORGANIZED HEALTH CARE EDUCATION/TRAINING PROGRAM

## 2023-06-02 PROCEDURE — 99999 PR PBB SHADOW E&M-EST. PATIENT-LVL IV: ICD-10-PCS | Mod: PBBFAC,,, | Performed by: STUDENT IN AN ORGANIZED HEALTH CARE EDUCATION/TRAINING PROGRAM

## 2023-06-02 PROCEDURE — 85045 AUTOMATED RETICULOCYTE COUNT: CPT | Performed by: STUDENT IN AN ORGANIZED HEALTH CARE EDUCATION/TRAINING PROGRAM

## 2023-06-02 PROCEDURE — 1159F PR MEDICATION LIST DOCUMENTED IN MEDICAL RECORD: ICD-10-PCS | Mod: CPTII,S$GLB,, | Performed by: STUDENT IN AN ORGANIZED HEALTH CARE EDUCATION/TRAINING PROGRAM

## 2023-06-02 PROCEDURE — 84466 ASSAY OF TRANSFERRIN: CPT | Performed by: STUDENT IN AN ORGANIZED HEALTH CARE EDUCATION/TRAINING PROGRAM

## 2023-06-02 PROCEDURE — 99214 PR OFFICE/OUTPT VISIT, EST, LEVL IV, 30-39 MIN: ICD-10-PCS | Mod: S$GLB,,, | Performed by: STUDENT IN AN ORGANIZED HEALTH CARE EDUCATION/TRAINING PROGRAM

## 2023-06-02 PROCEDURE — 3074F SYST BP LT 130 MM HG: CPT | Mod: CPTII,S$GLB,, | Performed by: STUDENT IN AN ORGANIZED HEALTH CARE EDUCATION/TRAINING PROGRAM

## 2023-06-02 PROCEDURE — 4010F PR ACE/ARB THEARPY RXD/TAKEN: ICD-10-PCS | Mod: CPTII,S$GLB,, | Performed by: STUDENT IN AN ORGANIZED HEALTH CARE EDUCATION/TRAINING PROGRAM

## 2023-06-02 PROCEDURE — 3008F BODY MASS INDEX DOCD: CPT | Mod: CPTII,S$GLB,, | Performed by: STUDENT IN AN ORGANIZED HEALTH CARE EDUCATION/TRAINING PROGRAM

## 2023-06-02 PROCEDURE — 85060 PATHOLOGIST REVIEW: ICD-10-PCS | Mod: ,,, | Performed by: PATHOLOGY

## 2023-06-02 PROCEDURE — 1160F RVW MEDS BY RX/DR IN RCRD: CPT | Mod: CPTII,S$GLB,, | Performed by: STUDENT IN AN ORGANIZED HEALTH CARE EDUCATION/TRAINING PROGRAM

## 2023-06-02 PROCEDURE — 3008F PR BODY MASS INDEX (BMI) DOCUMENTED: ICD-10-PCS | Mod: CPTII,S$GLB,, | Performed by: STUDENT IN AN ORGANIZED HEALTH CARE EDUCATION/TRAINING PROGRAM

## 2023-06-02 PROCEDURE — 4010F ACE/ARB THERAPY RXD/TAKEN: CPT | Mod: CPTII,S$GLB,, | Performed by: STUDENT IN AN ORGANIZED HEALTH CARE EDUCATION/TRAINING PROGRAM

## 2023-06-02 PROCEDURE — 1101F PR PT FALLS ASSESS DOC 0-1 FALLS W/OUT INJ PAST YR: ICD-10-PCS | Mod: CPTII,S$GLB,, | Performed by: STUDENT IN AN ORGANIZED HEALTH CARE EDUCATION/TRAINING PROGRAM

## 2023-06-02 PROCEDURE — 36415 COLL VENOUS BLD VENIPUNCTURE: CPT | Mod: PO | Performed by: STUDENT IN AN ORGANIZED HEALTH CARE EDUCATION/TRAINING PROGRAM

## 2023-06-02 PROCEDURE — 99214 OFFICE O/P EST MOD 30 MIN: CPT | Mod: S$GLB,,, | Performed by: STUDENT IN AN ORGANIZED HEALTH CARE EDUCATION/TRAINING PROGRAM

## 2023-06-02 PROCEDURE — 80053 COMPREHEN METABOLIC PANEL: CPT | Performed by: STUDENT IN AN ORGANIZED HEALTH CARE EDUCATION/TRAINING PROGRAM

## 2023-06-02 PROCEDURE — 80061 LIPID PANEL: CPT | Performed by: STUDENT IN AN ORGANIZED HEALTH CARE EDUCATION/TRAINING PROGRAM

## 2023-06-02 PROCEDURE — 83036 HEMOGLOBIN GLYCOSYLATED A1C: CPT | Performed by: STUDENT IN AN ORGANIZED HEALTH CARE EDUCATION/TRAINING PROGRAM

## 2023-06-02 PROCEDURE — 3288F FALL RISK ASSESSMENT DOCD: CPT | Mod: CPTII,S$GLB,, | Performed by: STUDENT IN AN ORGANIZED HEALTH CARE EDUCATION/TRAINING PROGRAM

## 2023-06-02 PROCEDURE — 1126F AMNT PAIN NOTED NONE PRSNT: CPT | Mod: CPTII,S$GLB,, | Performed by: STUDENT IN AN ORGANIZED HEALTH CARE EDUCATION/TRAINING PROGRAM

## 2023-06-02 PROCEDURE — 3288F PR FALLS RISK ASSESSMENT DOCUMENTED: ICD-10-PCS | Mod: CPTII,S$GLB,, | Performed by: STUDENT IN AN ORGANIZED HEALTH CARE EDUCATION/TRAINING PROGRAM

## 2023-06-02 PROCEDURE — 1126F PR PAIN SEVERITY QUANTIFIED, NO PAIN PRESENT: ICD-10-PCS | Mod: CPTII,S$GLB,, | Performed by: STUDENT IN AN ORGANIZED HEALTH CARE EDUCATION/TRAINING PROGRAM

## 2023-06-02 PROCEDURE — 3078F PR MOST RECENT DIASTOLIC BLOOD PRESSURE < 80 MM HG: ICD-10-PCS | Mod: CPTII,S$GLB,, | Performed by: STUDENT IN AN ORGANIZED HEALTH CARE EDUCATION/TRAINING PROGRAM

## 2023-06-02 PROCEDURE — 3074F PR MOST RECENT SYSTOLIC BLOOD PRESSURE < 130 MM HG: ICD-10-PCS | Mod: CPTII,S$GLB,, | Performed by: STUDENT IN AN ORGANIZED HEALTH CARE EDUCATION/TRAINING PROGRAM

## 2023-06-02 PROCEDURE — 1160F PR REVIEW ALL MEDS BY PRESCRIBER/CLIN PHARMACIST DOCUMENTED: ICD-10-PCS | Mod: CPTII,S$GLB,, | Performed by: STUDENT IN AN ORGANIZED HEALTH CARE EDUCATION/TRAINING PROGRAM

## 2023-06-02 PROCEDURE — 85060 BLOOD SMEAR INTERPRETATION: CPT | Mod: ,,, | Performed by: PATHOLOGY

## 2023-06-02 PROCEDURE — 1159F MED LIST DOCD IN RCRD: CPT | Mod: CPTII,S$GLB,, | Performed by: STUDENT IN AN ORGANIZED HEALTH CARE EDUCATION/TRAINING PROGRAM

## 2023-06-02 PROCEDURE — 84443 ASSAY THYROID STIM HORMONE: CPT | Performed by: STUDENT IN AN ORGANIZED HEALTH CARE EDUCATION/TRAINING PROGRAM

## 2023-06-02 PROCEDURE — 3078F DIAST BP <80 MM HG: CPT | Mod: CPTII,S$GLB,, | Performed by: STUDENT IN AN ORGANIZED HEALTH CARE EDUCATION/TRAINING PROGRAM

## 2023-06-02 PROCEDURE — 1101F PT FALLS ASSESS-DOCD LE1/YR: CPT | Mod: CPTII,S$GLB,, | Performed by: STUDENT IN AN ORGANIZED HEALTH CARE EDUCATION/TRAINING PROGRAM

## 2023-06-02 RX ORDER — NAPROXEN SODIUM 220 MG/1
81 TABLET, FILM COATED ORAL DAILY
COMMUNITY

## 2023-06-02 RX ORDER — CLOTRIMAZOLE 1 %
CREAM (GRAM) TOPICAL 2 TIMES DAILY
Qty: 60 G | Refills: 0 | Status: ON HOLD | OUTPATIENT
Start: 2023-06-02 | End: 2023-08-08

## 2023-06-02 NOTE — PROGRESS NOTES
"[facility] CLINIC NOTE    Patient Name: Jaswinder Cifuentes  YOB: 1955    PRESENTING HISTORY     History of Present Illness:  Mr. Jaswinder Cifuentes is a 67 y.o. male here for annual.     Per my note last year:   Sees Dr. Olson.   Valvular heart disease. ?mitral  Has appointment tomorrow with Thoracic surgeon tomorrow to discuss valve replacement.   Hoping for minimally invasive.      pAF- on AC, metoprolol.      Yesterday went to  for left hip pain at site of previous injection.   Swelling. US wnl.   Pain improved, swelling still present.      Pre-dm. Good control. Never had DM.      RA- follows with Dr. Parra. On DMARD      Since then underwent TAVR. Uneventful post operative course.   No longer in Afib, AC stopped by his Cardiologist.       Hospitalization in fall for WILLIAM.Got IV, then transitioned to PO iron. Dr. Paiz did EGD only. Already has colonoscopy upcoming this year.   No capsule endoscopy.   No recurrence.       RA has been well controlled except during a bout when he took abx and interfered with medication.     Just got shingles vaccination x 1.               ROS      OBJECTIVE:   Vital Signs:  Vitals:    06/02/23 0921   BP: 128/74   Pulse: 62   Resp: 16   Temp: 98 °F (36.7 °C)   TempSrc: Oral   SpO2: 96%   Weight: 104.1 kg (229 lb 8 oz)   Height: 5' 9" (1.753 m)          Physical Exam  Vitals and nursing note reviewed.   Constitutional:       Appearance: He is not diaphoretic.   HENT:      Head: Normocephalic and atraumatic.      Right Ear: External ear normal.      Left Ear: External ear normal.   Eyes:      General: No scleral icterus.     Conjunctiva/sclera: Conjunctivae normal.      Pupils: Pupils are equal, round, and reactive to light.   Neck:      Thyroid: No thyromegaly.   Cardiovascular:      Rate and Rhythm: Normal rate and regular rhythm.      Heart sounds: Normal heart sounds. No murmur heard.  Pulmonary:      Effort: Pulmonary effort is normal. No " respiratory distress.      Breath sounds: Normal breath sounds. No wheezing or rales.   Musculoskeletal:         General: No tenderness or deformity. Normal range of motion.      Cervical back: Normal range of motion and neck supple.      Right lower leg: No edema.      Left lower leg: No edema.   Lymphadenopathy:      Cervical: No cervical adenopathy.   Skin:     General: Skin is warm and dry.      Comments: Erythematous rings x 2 on right lateral thigh.   Larger is approx 5 cm with raised margin, erythematous but relatively clear central ring.   Smaller is similar in character, approx 3cm.    Neurological:      Mental Status: He is alert and oriented to person, place, and time.      Gait: Gait is intact.   Psychiatric:         Mood and Affect: Mood and affect normal.         Cognition and Memory: Memory normal.         Judgment: Judgment normal.       ASSESSMENT & PLAN:     Routine general medical examination at a health care facility    Drug-induced immunodeficiency  On immunosuppressant meds, maintain good vaccination.     Ringworm  -     clotrimazole (LOTRIMIN) 1 % cream; Apply topically 2 (two) times daily. for 14 days  Dispense: 60 g; Refill: 0    Iron deficiency anemia, unspecified iron deficiency anemia type  -     CBC Auto Differential; Future; Expected date: 06/02/2023  -     Ferritin; Future; Expected date: 06/02/2023  -     Iron and TIBC; Future; Expected date: 06/02/2023  -     Pathologist Interpretation Differential; Future; Expected date: 06/02/2023  -     Comprehensive Metabolic Panel; Future; Expected date: 06/02/2023  -     Reticulocytes; Future; Expected date: 06/02/2023  -     TSH; Future; Expected date: 06/02/2023  Seems to have resolved, but I don't know what precipitated it.   Investigate with above.     Pre-diabetes  -     Hemoglobin A1C; Future; Expected date: 06/02/2023    Mixed hyperlipidemia  -     Lipid Panel; Future; Expected date: 06/02/2023    Prediabetes  -     TSH; Future; Expected  date: 06/02/2023    Good hypertension control  Continue current medications             Chaitanya Fernández MD   Internal Medicine

## 2023-06-05 LAB — PATH REV BLD -IMP: NORMAL

## 2023-06-16 ENCOUNTER — PATIENT MESSAGE (OUTPATIENT)
Dept: FAMILY MEDICINE | Facility: CLINIC | Age: 68
End: 2023-06-16
Payer: MEDICARE

## 2023-06-16 DIAGNOSIS — L30.9 DERMATITIS: Primary | ICD-10-CM

## 2023-06-16 RX ORDER — TRIAMCINOLONE ACETONIDE 1 MG/G
CREAM TOPICAL 2 TIMES DAILY
Qty: 80 G | Refills: 0 | Status: ON HOLD | OUTPATIENT
Start: 2023-06-16 | End: 2023-08-08

## 2023-06-21 ENCOUNTER — TELEPHONE (OUTPATIENT)
Dept: DERMATOLOGY | Facility: CLINIC | Age: 68
End: 2023-06-21
Payer: MEDICARE

## 2023-06-21 NOTE — TELEPHONE ENCOUNTER
Patient scheduled.     ----- Message from Michaela Garzon MA sent at 6/21/2023 10:22 AM CDT -----  Contact: self  Type:  Sooner Appointment Request    Caller is requesting a sooner appointment.  Caller declined first available appointment listed below.  Caller will not accept being placed on the waitlist and is requesting a message be sent to doctor.    Name of Caller:  pt  When is the first available appointment?  01/25/24  Symptoms:  patch on leg  Best Call Back Number:  [phone]    Additional Information:  please advise

## 2023-06-22 ENCOUNTER — OFFICE VISIT (OUTPATIENT)
Dept: DERMATOLOGY | Facility: CLINIC | Age: 68
End: 2023-06-22
Payer: MEDICARE

## 2023-06-22 DIAGNOSIS — B35.4 TINEA CORPORIS: Primary | ICD-10-CM

## 2023-06-22 PROCEDURE — 1160F RVW MEDS BY RX/DR IN RCRD: CPT | Mod: CPTII,S$GLB,, | Performed by: STUDENT IN AN ORGANIZED HEALTH CARE EDUCATION/TRAINING PROGRAM

## 2023-06-22 PROCEDURE — 3288F PR FALLS RISK ASSESSMENT DOCUMENTED: ICD-10-PCS | Mod: CPTII,S$GLB,, | Performed by: STUDENT IN AN ORGANIZED HEALTH CARE EDUCATION/TRAINING PROGRAM

## 2023-06-22 PROCEDURE — 3044F PR MOST RECENT HEMOGLOBIN A1C LEVEL <7.0%: ICD-10-PCS | Mod: CPTII,S$GLB,, | Performed by: STUDENT IN AN ORGANIZED HEALTH CARE EDUCATION/TRAINING PROGRAM

## 2023-06-22 PROCEDURE — 99203 OFFICE O/P NEW LOW 30 MIN: CPT | Mod: S$GLB,,, | Performed by: STUDENT IN AN ORGANIZED HEALTH CARE EDUCATION/TRAINING PROGRAM

## 2023-06-22 PROCEDURE — 3044F HG A1C LEVEL LT 7.0%: CPT | Mod: CPTII,S$GLB,, | Performed by: STUDENT IN AN ORGANIZED HEALTH CARE EDUCATION/TRAINING PROGRAM

## 2023-06-22 PROCEDURE — 1126F PR PAIN SEVERITY QUANTIFIED, NO PAIN PRESENT: ICD-10-PCS | Mod: CPTII,S$GLB,, | Performed by: STUDENT IN AN ORGANIZED HEALTH CARE EDUCATION/TRAINING PROGRAM

## 2023-06-22 PROCEDURE — 4010F PR ACE/ARB THEARPY RXD/TAKEN: ICD-10-PCS | Mod: CPTII,S$GLB,, | Performed by: STUDENT IN AN ORGANIZED HEALTH CARE EDUCATION/TRAINING PROGRAM

## 2023-06-22 PROCEDURE — 4010F ACE/ARB THERAPY RXD/TAKEN: CPT | Mod: CPTII,S$GLB,, | Performed by: STUDENT IN AN ORGANIZED HEALTH CARE EDUCATION/TRAINING PROGRAM

## 2023-06-22 PROCEDURE — 1159F MED LIST DOCD IN RCRD: CPT | Mod: CPTII,S$GLB,, | Performed by: STUDENT IN AN ORGANIZED HEALTH CARE EDUCATION/TRAINING PROGRAM

## 2023-06-22 PROCEDURE — 1159F PR MEDICATION LIST DOCUMENTED IN MEDICAL RECORD: ICD-10-PCS | Mod: CPTII,S$GLB,, | Performed by: STUDENT IN AN ORGANIZED HEALTH CARE EDUCATION/TRAINING PROGRAM

## 2023-06-22 PROCEDURE — 1101F PT FALLS ASSESS-DOCD LE1/YR: CPT | Mod: CPTII,S$GLB,, | Performed by: STUDENT IN AN ORGANIZED HEALTH CARE EDUCATION/TRAINING PROGRAM

## 2023-06-22 PROCEDURE — 1126F AMNT PAIN NOTED NONE PRSNT: CPT | Mod: CPTII,S$GLB,, | Performed by: STUDENT IN AN ORGANIZED HEALTH CARE EDUCATION/TRAINING PROGRAM

## 2023-06-22 PROCEDURE — 3288F FALL RISK ASSESSMENT DOCD: CPT | Mod: CPTII,S$GLB,, | Performed by: STUDENT IN AN ORGANIZED HEALTH CARE EDUCATION/TRAINING PROGRAM

## 2023-06-22 PROCEDURE — 99203 PR OFFICE/OUTPT VISIT, NEW, LEVL III, 30-44 MIN: ICD-10-PCS | Mod: S$GLB,,, | Performed by: STUDENT IN AN ORGANIZED HEALTH CARE EDUCATION/TRAINING PROGRAM

## 2023-06-22 PROCEDURE — 1160F PR REVIEW ALL MEDS BY PRESCRIBER/CLIN PHARMACIST DOCUMENTED: ICD-10-PCS | Mod: CPTII,S$GLB,, | Performed by: STUDENT IN AN ORGANIZED HEALTH CARE EDUCATION/TRAINING PROGRAM

## 2023-06-22 PROCEDURE — 1101F PR PT FALLS ASSESS DOC 0-1 FALLS W/OUT INJ PAST YR: ICD-10-PCS | Mod: CPTII,S$GLB,, | Performed by: STUDENT IN AN ORGANIZED HEALTH CARE EDUCATION/TRAINING PROGRAM

## 2023-06-22 RX ORDER — TERBINAFINE HYDROCHLORIDE 250 MG/1
250 TABLET ORAL DAILY
Qty: 14 TABLET | Refills: 0 | Status: SHIPPED | OUTPATIENT
Start: 2023-06-22 | End: 2023-07-06

## 2023-06-22 RX ORDER — ECONAZOLE NITRATE 10 MG/G
CREAM TOPICAL
Qty: 85 G | Refills: 0 | Status: SHIPPED | OUTPATIENT
Start: 2023-06-22 | End: 2024-01-12

## 2023-06-22 NOTE — PROGRESS NOTES
Subjective:      Patient ID:  Jaswinder Cifuentes is a 68 y.o. male who presents for   Chief Complaint   Patient presents with    Rash     Right thigh      New patient     Patient here today for rash on right thigh since January. Patient states it itches sometimes, a little sensitive. PCP gave him Lotrimin cream and then triamcinolone, both did not help.    Review of Systems   Constitutional:  Negative for fever, chills and fatigue.   Respiratory:  Negative for cough and shortness of breath.    Gastrointestinal:  Negative for nausea and vomiting.   Skin:  Positive for itching and rash.     Objective:   Physical Exam   Constitutional: He appears well-developed and well-nourished.   Neurological: He is alert and oriented to person, place, and time.   Psychiatric: He has a normal mood and affect.   Skin:   Areas Examined (abnormalities noted in diagram):   RUE Inspected          Diagram Legend     Erythematous scaling macule/papule c/w actinic keratosis       Vascular papule c/w angioma      Pigmented verrucoid papule/plaque c/w seborrheic keratosis      Yellow umbilicated papule c/w sebaceous hyperplasia      Irregularly shaped tan macule c/w lentigo     1-2 mm smooth white papules consistent with Milia      Movable subcutaneous cyst with punctum c/w epidermal inclusion cyst      Subcutaneous movable cyst c/w pilar cyst      Firm pink to brown papule c/w dermatofibroma      Pedunculated fleshy papule(s) c/w skin tag(s)      Evenly pigmented macule c/w junctional nevus     Mildly variegated pigmented, slightly irregular-bordered macule c/w mildly atypical nevus      Flesh colored to evenly pigmented papule c/w intradermal nevus       Pink pearly papule/plaque c/w basal cell carcinoma      Erythematous hyperkeratotic cursted plaque c/w SCC      Surgical scar with no sign of skin cancer recurrence      Open and closed comedones      Inflammatory papules and pustules      Verrucoid papule consistent consistent with wart      Erythematous eczematous patches and plaques     Dystrophic onycholytic nail with subungual debris c/w onychomycosis     Umbilicated papule    Erythematous-base heme-crusted tan verrucoid plaque consistent with inflamed seborrheic keratosis     Erythematous Silvery Scaling Plaque c/w Psoriasis     See annotation      Assessment / Plan:        Tinea corporis  -     terbinafine HCL (LAMISIL) 250 mg tablet; Take 1 tablet (250 mg total) by mouth once daily. for 14 days  Dispense: 14 tablet; Refill: 0  -     econazole nitrate 1 % cream; AAA bid  Dispense: 85 g; Refill: 0  KOH + for hyphae   Discussed risks and benefits of lamisil PO  LFTs WNL  If econazole not covered recommend OTC terbinafine cream  Stop tac cream           No follow-ups on file.

## 2023-06-23 ENCOUNTER — TELEPHONE (OUTPATIENT)
Dept: DERMATOLOGY | Facility: CLINIC | Age: 68
End: 2023-06-23
Payer: MEDICARE

## 2023-06-23 NOTE — TELEPHONE ENCOUNTER
Received fax from [facility] that pt's Rx for econazole nitrate cream was approved until 12/31/23

## 2023-06-27 ENCOUNTER — LAB VISIT (OUTPATIENT)
Dept: LAB | Facility: HOSPITAL | Age: 68
End: 2023-06-27
Attending: UROLOGY
Payer: MEDICARE

## 2023-06-27 DIAGNOSIS — R97.20 ELEVATED PSA: ICD-10-CM

## 2023-06-27 DIAGNOSIS — N42.32 ATYPICAL SMALL ACINAR PROLIFERATION OF PROSTATE: ICD-10-CM

## 2023-06-27 LAB
PROSTATE SPECIFIC ANTIGEN, TOTAL: 2.4 NG/ML (ref 0–4)
PSA FREE MFR SERPL: 14.17 %
PSA FREE SERPL-MCNC: 0.34 NG/ML (ref 0–1.5)

## 2023-06-27 PROCEDURE — 36415 COLL VENOUS BLD VENIPUNCTURE: CPT | Performed by: UROLOGY

## 2023-06-27 PROCEDURE — 84153 ASSAY OF PSA TOTAL: CPT | Performed by: UROLOGY

## 2023-07-05 NOTE — H&P (VIEW-ONLY)
[facility] Urology Progress Note     Jaswinder Cifuentes is a 68 y.o. male who presents For follow up of BPH with elevated psa and ASAP    PMHx of HTN, HLD, AP, DM, AS, RA (Dr Parra, off flutamide)     Started flomax in  with reduction in AUSS from  to 7/2 with less urgency/frequency and less nocturia in 2019 pvr 0, then fu with NP in  AUA SS 5/3, pvr 46.   Continued flomax     21: has been told by cardiology Dr Olson he may need AV replacement. Otherwise urinating well, stable on flomax. AUA SS: 5/2 (3: sleeping, 2 frequency  Dr olson 21; Some palpitations since last visit, Severe AS: Will check with TAVR clinic to see when he is to be evaluated again, at this point in time he remains asymptomatic  HTN: continue benazepril and metoprolol. FU 3 mos     22 NP f/u AUA SS:  17/3 (ICBE:2, Frequency:3, Intermittency:2, Urgency:3, Weak stream:2, Strainin, Nocturia:4). NTFx4 only while drinking during holidays, since stopping alcohol NTFx1   PSA up from 2.8 in 21 to 4.5 on 22, and recheck 3.8 (15.26% free) on 22.. Previously PSA 3.3 on 19, 1.9 on 18, 3.1 on 16, 2.2 in , 1.2 in   No family history of prostate cancer. Beer in evening increases nocturnal frequency  Still on flomax 0.4mg and feels like he is urinating well. AUA SS:  (2:  Urgency, sleeping; 1:  Emptying, frequency, intermittency, weak stream) meds help 9/10  AP - no cpap, not controlled. Echo and holter last week and holter had more than expected arrythmias, so starting 30d holter soon. CY 35g benign, some hemorrhoids     Cysto/prostate biopsy 22: volume 46.9cc, U/S: mild PVR, no med lobe, L sv more full than right, scattered hypoechoities, more on left near apex and TZ  CYSTO FINDINGS:  Significant prostate obstruction with anterolateral lobe ingrowth centrally to meet high bladder neck and elevated floor of prostate at BN with eearly extension of bilat lat lobes seen on  "retroflexion without median lobe, moderate diffuse thick trabevulations, and small cellules on posteiror wall  PATH: Focus of ASAP at  med  Restarted bloodthinners without issue. Feels like LUTS are well controlled on flomax    He returns today noting  PSA 2.7 (14.44% free) on 12/9/22  PSA 2.4 (14.17% free) on 6/27/23 6/2/23 HbA1c is 5.5, Cr is 1.0, eGFR >60  Followed up with Dr Olson recently, noting Hx of TAVR and has Pacemaker for pAF which interrogration in 1/23 noted <1% AF burden as did it in 4/23 so he stopped eliqus  TAVR and pacemaker was 1 yr ago today  On flomax 0.4mg daily AUA SS: 7/1 (3 frequency, 2 sleeping, 1: emptying, weak stream). Frequency more of a bother with beer intake otherwise not too bad         Focused Physical Exam:    Vitals:    07/11/23 0815   BP: 124/83   Pulse: 60     Body mass index is 34.18 kg/m². Weight: 105 kg (231 lb 7.7 oz) Height: 5' 9" (175.3 cm)     Abdomen: Soft, non-tender, nondistended, no CVA tenderness  :  deferred to time of prostate biopsy    Recent Results (from the past 336 hour(s))   POCT Urinalysis(Instrument)    Collection Time: 07/11/23  8:26 AM   Result Value Ref Range    Color, POC UA Yellow Yellow, Straw, Colorless    Clarity, POC UA Clear Clear    Glucose, POC UA Negative Negative    Bilirubin, POC UA Negative Negative    Ketones, POC UA Negative Negative    Spec Grav POC UA 1.015 1.005 - 1.030    Blood, POC UA Negative Negative    pH, POC UA 6.0 5.0 - 8.0    Protein, POC UA Negative Negative    Urobilinogen, POC UA 0.2 <=1.0    Nitrite, POC UA Negative Negative    WBC, POC UA Trace (A) Negative       ASSESSMENT   1. Atypical small acinar proliferation of prostate  POCT Urinalysis(Instrument)    Urine culture    Procedure Order to Urology      2. BPH with obstruction/lower urinary tract symptoms            Plan    Interim medical record review and labs as above as well as interim cardiology review noting his stable follow-up after TAVR and pacemaker " and ability to discontinue chronic anticoagulation.  Overall his urinary symptoms are controlled on Flomax but does still have some occasional urinary frequency, exacerbated with bladder irritant intake.  We did review the natural history of BPH with obstruction and progressive changes at the level of the bladder which were noted at time of his cystoscopy and again reviewed the utility of BPH intervention to discontinue medical therapy and protect the bladder going forward.  Still interested in this, however discussed his past PSA elevations, with atypical small acinar proliferation noted on biopsy 1 year ago.  Again reviewed guideline recommendations to rebiopsy within 6-12 months noting that this finding can yield up to 40% finding of prostate cancer on repeat biopsy.  While his PSA has dropped back down into normal range as a total PSA and is stable, his free PSA remains low and concerning.  Of course the free PSA is more accurate with true PSA elevations, but with the combinations of atypical small acinar proliferation on pathology, and free PSA in the teens, did recommend updating his prostate biopsy, targeting the area of previous positivity, prior to making any decisions about BPH intervention.  All questions answered and patient was in agreement.  Reviewed prostate biopsy in detail and all prep instructions were provided.  Prostate biopsy was scheduled at the [facility] on 8/8/23.  Will review pathology at follow-up and make further plans for his BPH if no prostate cancer is found, or discuss any positive findings at that time.  Ucx today prebx 2/2 to leuks on udip    Total time spent in/on encounter today, including face to face time with patient, counseling, medical record review, interpretation of tests/results, , and treatment plan coordination: 40 minutes

## 2023-07-05 NOTE — PROGRESS NOTES
[facility] Urology Progress Note     Jaswinder Cifuentes is a 68 y.o. male who presents For follow up of BPH with elevated psa and ASAP    PMHx of HTN, HLD, AP, DM, AS, RA (Dr Parra, off flutamide)     Started flomax in  with reduction in AUSS from  to 7/2 with less urgency/frequency and less nocturia in 2019 pvr 0, then fu with NP in  AUA SS 5/3, pvr 46.   Continued flomax     21: has been told by cardiology Dr Olson he may need AV replacement. Otherwise urinating well, stable on flomax. AUA SS: 5/2 (3: sleeping, 2 frequency  Dr olson 21; Some palpitations since last visit, Severe AS: Will check with TAVR clinic to see when he is to be evaluated again, at this point in time he remains asymptomatic  HTN: continue benazepril and metoprolol. FU 3 mos     22 NP f/u AUA SS:  17/3 (ICBE:2, Frequency:3, Intermittency:2, Urgency:3, Weak stream:2, Strainin, Nocturia:4). NTFx4 only while drinking during holidays, since stopping alcohol NTFx1   PSA up from 2.8 in 21 to 4.5 on 22, and recheck 3.8 (15.26% free) on 22.. Previously PSA 3.3 on 19, 1.9 on 18, 3.1 on 16, 2.2 in , 1.2 in   No family history of prostate cancer. Beer in evening increases nocturnal frequency  Still on flomax 0.4mg and feels like he is urinating well. AUA SS:  (2:  Urgency, sleeping; 1:  Emptying, frequency, intermittency, weak stream) meds help 9/10  AP - no cpap, not controlled. Echo and holter last week and holter had more than expected arrythmias, so starting 30d holter soon. CY 35g benign, some hemorrhoids     Cysto/prostate biopsy 22: volume 46.9cc, U/S: mild PVR, no med lobe, L sv more full than right, scattered hypoechoities, more on left near apex and TZ  CYSTO FINDINGS:  Significant prostate obstruction with anterolateral lobe ingrowth centrally to meet high bladder neck and elevated floor of prostate at BN with eearly extension of bilat lat lobes seen on  "retroflexion without median lobe, moderate diffuse thick trabevulations, and small cellules on posteiror wall  PATH: Focus of ASAP at  med  Restarted bloodthinners without issue. Feels like LUTS are well controlled on flomax    He returns today noting  PSA 2.7 (14.44% free) on 12/9/22  PSA 2.4 (14.17% free) on 6/27/23 6/2/23 HbA1c is 5.5, Cr is 1.0, eGFR >60  Followed up with Dr Olson recently, noting Hx of TAVR and has Pacemaker for pAF which interrogration in 1/23 noted <1% AF burden as did it in 4/23 so he stopped eliqus  TAVR and pacemaker was 1 yr ago today  On flomax 0.4mg daily AUA SS: 7/1 (3 frequency, 2 sleeping, 1: emptying, weak stream). Frequency more of a bother with beer intake otherwise not too bad         Focused Physical Exam:    Vitals:    07/11/23 0815   BP: 124/83   Pulse: 60     Body mass index is 34.18 kg/m². Weight: 105 kg (231 lb 7.7 oz) Height: 5' 9" (175.3 cm)     Abdomen: Soft, non-tender, nondistended, no CVA tenderness  :  deferred to time of prostate biopsy    Recent Results (from the past 336 hour(s))   POCT Urinalysis(Instrument)    Collection Time: 07/11/23  8:26 AM   Result Value Ref Range    Color, POC UA Yellow Yellow, Straw, Colorless    Clarity, POC UA Clear Clear    Glucose, POC UA Negative Negative    Bilirubin, POC UA Negative Negative    Ketones, POC UA Negative Negative    Spec Grav POC UA 1.015 1.005 - 1.030    Blood, POC UA Negative Negative    pH, POC UA 6.0 5.0 - 8.0    Protein, POC UA Negative Negative    Urobilinogen, POC UA 0.2 <=1.0    Nitrite, POC UA Negative Negative    WBC, POC UA Trace (A) Negative       ASSESSMENT   1. Atypical small acinar proliferation of prostate  POCT Urinalysis(Instrument)    Urine culture    Procedure Order to Urology      2. BPH with obstruction/lower urinary tract symptoms            Plan    Interim medical record review and labs as above as well as interim cardiology review noting his stable follow-up after TAVR and pacemaker " and ability to discontinue chronic anticoagulation.  Overall his urinary symptoms are controlled on Flomax but does still have some occasional urinary frequency, exacerbated with bladder irritant intake.  We did review the natural history of BPH with obstruction and progressive changes at the level of the bladder which were noted at time of his cystoscopy and again reviewed the utility of BPH intervention to discontinue medical therapy and protect the bladder going forward.  Still interested in this, however discussed his past PSA elevations, with atypical small acinar proliferation noted on biopsy 1 year ago.  Again reviewed guideline recommendations to rebiopsy within 6-12 months noting that this finding can yield up to 40% finding of prostate cancer on repeat biopsy.  While his PSA has dropped back down into normal range as a total PSA and is stable, his free PSA remains low and concerning.  Of course the free PSA is more accurate with true PSA elevations, but with the combinations of atypical small acinar proliferation on pathology, and free PSA in the teens, did recommend updating his prostate biopsy, targeting the area of previous positivity, prior to making any decisions about BPH intervention.  All questions answered and patient was in agreement.  Reviewed prostate biopsy in detail and all prep instructions were provided.  Prostate biopsy was scheduled at the [facility] on 8/8/23.  Will review pathology at follow-up and make further plans for his BPH if no prostate cancer is found, or discuss any positive findings at that time.  Ucx today prebx 2/2 to leuks on udip    Total time spent in/on encounter today, including face to face time with patient, counseling, medical record review, interpretation of tests/results, , and treatment plan coordination: 40 minutes

## 2023-07-11 ENCOUNTER — OFFICE VISIT (OUTPATIENT)
Dept: UROLOGY | Facility: CLINIC | Age: 68
End: 2023-07-11
Payer: MEDICARE

## 2023-07-11 VITALS
DIASTOLIC BLOOD PRESSURE: 83 MMHG | HEIGHT: 69 IN | BODY MASS INDEX: 34.29 KG/M2 | HEART RATE: 60 BPM | SYSTOLIC BLOOD PRESSURE: 124 MMHG | WEIGHT: 231.5 LBS

## 2023-07-11 DIAGNOSIS — N13.8 BPH WITH OBSTRUCTION/LOWER URINARY TRACT SYMPTOMS: ICD-10-CM

## 2023-07-11 DIAGNOSIS — N40.1 BPH WITH OBSTRUCTION/LOWER URINARY TRACT SYMPTOMS: ICD-10-CM

## 2023-07-11 DIAGNOSIS — N42.32 ATYPICAL SMALL ACINAR PROLIFERATION OF PROSTATE: Primary | ICD-10-CM

## 2023-07-11 LAB
BILIRUBIN, UA POC OHS: NEGATIVE
BLOOD, UA POC OHS: NEGATIVE
CLARITY, UA POC OHS: CLEAR
COLOR, UA POC OHS: YELLOW
GLUCOSE, UA POC OHS: NEGATIVE
KETONES, UA POC OHS: NEGATIVE
LEUKOCYTES, UA POC OHS: ABNORMAL
NITRITE, UA POC OHS: NEGATIVE
PH, UA POC OHS: 6
PROTEIN, UA POC OHS: NEGATIVE
SPECIFIC GRAVITY, UA POC OHS: 1.01
UROBILINOGEN, UA POC OHS: 0.2

## 2023-07-11 PROCEDURE — 3074F SYST BP LT 130 MM HG: CPT | Mod: CPTII,S$GLB,, | Performed by: UROLOGY

## 2023-07-11 PROCEDURE — 3044F PR MOST RECENT HEMOGLOBIN A1C LEVEL <7.0%: ICD-10-PCS | Mod: CPTII,S$GLB,, | Performed by: UROLOGY

## 2023-07-11 PROCEDURE — 3074F PR MOST RECENT SYSTOLIC BLOOD PRESSURE < 130 MM HG: ICD-10-PCS | Mod: CPTII,S$GLB,, | Performed by: UROLOGY

## 2023-07-11 PROCEDURE — 99215 OFFICE O/P EST HI 40 MIN: CPT | Mod: S$GLB,,, | Performed by: UROLOGY

## 2023-07-11 PROCEDURE — 1126F PR PAIN SEVERITY QUANTIFIED, NO PAIN PRESENT: ICD-10-PCS | Mod: CPTII,S$GLB,, | Performed by: UROLOGY

## 2023-07-11 PROCEDURE — 4010F ACE/ARB THERAPY RXD/TAKEN: CPT | Mod: CPTII,S$GLB,, | Performed by: UROLOGY

## 2023-07-11 PROCEDURE — 1159F PR MEDICATION LIST DOCUMENTED IN MEDICAL RECORD: ICD-10-PCS | Mod: CPTII,S$GLB,, | Performed by: UROLOGY

## 2023-07-11 PROCEDURE — 1160F RVW MEDS BY RX/DR IN RCRD: CPT | Mod: CPTII,S$GLB,, | Performed by: UROLOGY

## 2023-07-11 PROCEDURE — 3288F FALL RISK ASSESSMENT DOCD: CPT | Mod: CPTII,S$GLB,, | Performed by: UROLOGY

## 2023-07-11 PROCEDURE — 3008F BODY MASS INDEX DOCD: CPT | Mod: CPTII,S$GLB,, | Performed by: UROLOGY

## 2023-07-11 PROCEDURE — 99999 PR PBB SHADOW E&M-EST. PATIENT-LVL IV: ICD-10-PCS | Mod: PBBFAC,,, | Performed by: UROLOGY

## 2023-07-11 PROCEDURE — 3288F PR FALLS RISK ASSESSMENT DOCUMENTED: ICD-10-PCS | Mod: CPTII,S$GLB,, | Performed by: UROLOGY

## 2023-07-11 PROCEDURE — 81003 POCT URINALYSIS(INSTRUMENT): ICD-10-PCS | Mod: QW,S$GLB,, | Performed by: UROLOGY

## 2023-07-11 PROCEDURE — 81003 URINALYSIS AUTO W/O SCOPE: CPT | Mod: QW,S$GLB,, | Performed by: UROLOGY

## 2023-07-11 PROCEDURE — 1101F PR PT FALLS ASSESS DOC 0-1 FALLS W/OUT INJ PAST YR: ICD-10-PCS | Mod: CPTII,S$GLB,, | Performed by: UROLOGY

## 2023-07-11 PROCEDURE — 1159F MED LIST DOCD IN RCRD: CPT | Mod: CPTII,S$GLB,, | Performed by: UROLOGY

## 2023-07-11 PROCEDURE — 99999 PR PBB SHADOW E&M-EST. PATIENT-LVL IV: CPT | Mod: PBBFAC,,, | Performed by: UROLOGY

## 2023-07-11 PROCEDURE — 3008F PR BODY MASS INDEX (BMI) DOCUMENTED: ICD-10-PCS | Mod: CPTII,S$GLB,, | Performed by: UROLOGY

## 2023-07-11 PROCEDURE — 3044F HG A1C LEVEL LT 7.0%: CPT | Mod: CPTII,S$GLB,, | Performed by: UROLOGY

## 2023-07-11 PROCEDURE — 1160F PR REVIEW ALL MEDS BY PRESCRIBER/CLIN PHARMACIST DOCUMENTED: ICD-10-PCS | Mod: CPTII,S$GLB,, | Performed by: UROLOGY

## 2023-07-11 PROCEDURE — 99215 PR OFFICE/OUTPT VISIT, EST, LEVL V, 40-54 MIN: ICD-10-PCS | Mod: S$GLB,,, | Performed by: UROLOGY

## 2023-07-11 PROCEDURE — 1101F PT FALLS ASSESS-DOCD LE1/YR: CPT | Mod: CPTII,S$GLB,, | Performed by: UROLOGY

## 2023-07-11 PROCEDURE — 3079F DIAST BP 80-89 MM HG: CPT | Mod: CPTII,S$GLB,, | Performed by: UROLOGY

## 2023-07-11 PROCEDURE — 3079F PR MOST RECENT DIASTOLIC BLOOD PRESSURE 80-89 MM HG: ICD-10-PCS | Mod: CPTII,S$GLB,, | Performed by: UROLOGY

## 2023-07-11 PROCEDURE — 1126F AMNT PAIN NOTED NONE PRSNT: CPT | Mod: CPTII,S$GLB,, | Performed by: UROLOGY

## 2023-07-11 PROCEDURE — 4010F PR ACE/ARB THEARPY RXD/TAKEN: ICD-10-PCS | Mod: CPTII,S$GLB,, | Performed by: UROLOGY

## 2023-07-11 RX ORDER — CIPROFLOXACIN 500 MG/1
500 TABLET ORAL 2 TIMES DAILY
Qty: 6 TABLET | Refills: 0 | Status: SHIPPED | OUTPATIENT
Start: 2023-07-11 | End: 2024-01-12

## 2023-07-12 DIAGNOSIS — N42.32 ATYPICAL SMALL ACINAR PROLIFERATION OF PROSTATE: ICD-10-CM

## 2023-07-12 RX ORDER — GENTAMICIN SULFATE 40 MG/ML
80 INJECTION, SOLUTION INTRAMUSCULAR; INTRAVENOUS ONCE
Status: SHIPPED | OUTPATIENT
Start: 2023-07-12

## 2023-07-12 NOTE — PROGRESS NOTES
Procedure Order to Urology [[id number]]    Electronically signed by: Jaswinder Real MD on 07/11/23 0903 Status: Active   Ordering user: Jaswinder Real MD 07/11/23 0903 Authorized by: Jaswinder Real MD   Ordering mode: Standard   Frequency:  07/11/23 -     Diagnoses   Atypical small acinar proliferation of prostate [N42.32]   Questionnaire    Question Answer   Procedure Prostate Biopsy Comment - 8/8   Facility Name: [place]   ASC, please order local sedation, UA poct without micro,  preop IM Gentamyacin 80mg,160mg if over 300 lbs

## 2023-07-14 LAB
LEFT EYE DM RETINOPATHY: NEGATIVE
RIGHT EYE DM RETINOPATHY: NEGATIVE

## 2023-07-20 ENCOUNTER — PATIENT OUTREACH (OUTPATIENT)
Dept: ADMINISTRATIVE | Facility: HOSPITAL | Age: 68
End: 2023-07-20
Payer: MEDICARE

## 2023-07-20 NOTE — PROGRESS NOTES
Population Health Chart Review & Patient Outreach Details:     Reason for Outreach Encounter:     []  Non-Compliant Report   []  Payor Report ([employer], PHN, BCBS, MSSP, MCIP, UHC, etc.)   []  Pre-Visit Chart Review     Updates Requested / Reviewed:     [x]  Care Everywhere    []     []  External Sources ([facility], [facility], DIS, etc.)   [x]  Care Team Updated    Patient Outreach Method:    []  Telephone Outreach Completed   [] Successful   [] Left Voicemail   [] Unable to Contact (wrong number, no voicemail)  []  [employer] Portal Outreach Sent  []  Letter Outreach Mailed  []  Fax Sent for External Records  [x]  External Records Upload    Health Maintenance Topics Addressed and Outreach Outcomes / Actions Taken:        []      Breast Cancer Screening []  Mammo Scheduled      []  External Records Requested     []  Added Reminder to Complete to Upcoming Primary Care Appt Notes     []  Patient Declined     []  Patient Will Call Back to Schedule     []  Patient Will Schedule with External Provider / Order Routed if Applicable             []       Cervical Cancer Screening []  Pap Scheduled      []  External Records Requested     []  Added Reminder to Complete to Upcoming Primary Care Appt Notes     []  Patient Declined     []  Patient Will Call Back to Schedule     []  Patient Will Schedule with External Provider               []          Colorectal Cancer Screening []  Colonoscopy Case Request or Referral Placed     []  External Records Requested     []  Added Reminder to Complete to Upcoming Primary Care Appt Notes     []  Patient Declined     []  Patient Will Call Back to Schedule     []  Patient Will Schedule with External Provider     []  Fit Kit Mailed (add the SmartPhrase under additional notes)     []  Reminded Patient to Complete Home Test             [x]      Diabetic Eye Exam []  Eye Camera Scheduled or Optometry Referral Placed     []  External Records Requested     []  Added Reminder to Complete to  Upcoming Primary Care Appt Notes     []  Patient Declined     []  Patient Will Call Back to Schedule     []  Patient Will Schedule with External Provider             []      Blood Pressure Control []  Primary Care Follow Up Visit Scheduled     []  Remote Blood Pressure Reading Captured     []  Added Reminder to Complete to Upcoming Primary Care Appt Notes     []  Patient Declined     []  Patient Will Call Back / Patient Will Send Portal Message with Reading     []  Patient Will Call Back to Schedule Provider Visit             []       HbA1c & Other Labs []  Lab Appt Scheduled for Due Labs     []  Primary Care Follow Up Visit Scheduled      []  Reminded Patient to Complete Home Test     []  Added Reminder to Complete to Upcoming Primary Care Appt Notes     []  Patient Declined     []  Patient Will Call Back to Schedule     []  Patient Will Schedule with External Provider / Order Routed if Applicable           []    Schedule Primary Care Appt []  Primary Care Appt Scheduled     []  Patient Declined     []  Patient Will Call Back to Schedule     []  Pt Established with External Provider & Updated Care Team             []      Medication Adherence []  Primary Care Appointment Scheduled     []  Added Reminder to Upcoming Primary Care Appt Notes     []  Patient Reminded to  Prescription     []  Patient Declined, Provider Notified if Needed     []  Sent Provider Message to Review and/or Add Exclusion to Problem List             []      Osteoporosis Screening []  DXA Appointment Scheduled     []  External Records Requested     []  Added Reminder to Complete to Upcoming Primary Care Appt Notes     []  Patient Declined     []  Patient Will Call Back to Schedule     []  Patient Will Schedule with External Provider / Order Routed if Applicable     Additional Care Coordinator Notes:         Further Action Needed If Patient Returns Outreach:

## 2023-07-24 RX ORDER — TAMSULOSIN HYDROCHLORIDE 0.4 MG/1
CAPSULE ORAL
Qty: 30 CAPSULE | Refills: 11 | Status: SHIPPED | OUTPATIENT
Start: 2023-07-24

## 2023-07-25 ENCOUNTER — LAB VISIT (OUTPATIENT)
Dept: LAB | Facility: HOSPITAL | Age: 68
End: 2023-07-25
Attending: UROLOGY
Payer: MEDICARE

## 2023-07-25 DIAGNOSIS — Z79.899 LONG-TERM USE OF HIGH-RISK MEDICATION: ICD-10-CM

## 2023-07-25 DIAGNOSIS — M05.79 SEROPOSITIVE RHEUMATOID ARTHRITIS OF MULTIPLE SITES: ICD-10-CM

## 2023-07-25 LAB
ALBUMIN SERPL BCP-MCNC: 3.6 G/DL (ref 3.5–5.2)
ALP SERPL-CCNC: 77 U/L (ref 55–135)
ALT SERPL W/O P-5'-P-CCNC: 22 U/L (ref 10–44)
ANION GAP SERPL CALC-SCNC: 9 MMOL/L (ref 8–16)
AST SERPL-CCNC: 23 U/L (ref 10–40)
BASOPHILS # BLD AUTO: 0.04 K/UL (ref 0–0.2)
BASOPHILS NFR BLD: 0.6 % (ref 0–1.9)
BILIRUB SERPL-MCNC: 0.4 MG/DL (ref 0.1–1)
BUN SERPL-MCNC: 15 MG/DL (ref 8–23)
CALCIUM SERPL-MCNC: 9.1 MG/DL (ref 8.7–10.5)
CHLORIDE SERPL-SCNC: 106 MMOL/L (ref 95–110)
CO2 SERPL-SCNC: 24 MMOL/L (ref 23–29)
CREAT SERPL-MCNC: 0.9 MG/DL (ref 0.5–1.4)
CRP SERPL-MCNC: 7.5 MG/L (ref 0–8.2)
DIFFERENTIAL METHOD: ABNORMAL
EOSINOPHIL # BLD AUTO: 0.2 K/UL (ref 0–0.5)
EOSINOPHIL NFR BLD: 3.6 % (ref 0–8)
ERYTHROCYTE [DISTWIDTH] IN BLOOD BY AUTOMATED COUNT: 13.2 % (ref 11.5–14.5)
ERYTHROCYTE [SEDIMENTATION RATE] IN BLOOD BY WESTERGREN METHOD: 40 MM/HR (ref 0–10)
EST. GFR  (NO RACE VARIABLE): >60 ML/MIN/1.73 M^2
GLUCOSE SERPL-MCNC: 92 MG/DL (ref 70–110)
HCT VFR BLD AUTO: 40 % (ref 40–54)
HGB BLD-MCNC: 13.2 G/DL (ref 14–18)
IMM GRANULOCYTES # BLD AUTO: 0.01 K/UL (ref 0–0.04)
IMM GRANULOCYTES NFR BLD AUTO: 0.2 % (ref 0–0.5)
LYMPHOCYTES # BLD AUTO: 1.6 K/UL (ref 1–4.8)
LYMPHOCYTES NFR BLD: 24 % (ref 18–48)
MCH RBC QN AUTO: 29.1 PG (ref 27–31)
MCHC RBC AUTO-ENTMCNC: 33 G/DL (ref 32–36)
MCV RBC AUTO: 88 FL (ref 82–98)
MONOCYTES # BLD AUTO: 0.8 K/UL (ref 0.3–1)
MONOCYTES NFR BLD: 12.5 % (ref 4–15)
NEUTROPHILS # BLD AUTO: 3.9 K/UL (ref 1.8–7.7)
NEUTROPHILS NFR BLD: 59.1 % (ref 38–73)
NRBC BLD-RTO: 0 /100 WBC
PLATELET # BLD AUTO: 175 K/UL (ref 150–450)
PMV BLD AUTO: 11.5 FL (ref 9.2–12.9)
POTASSIUM SERPL-SCNC: 4.4 MMOL/L (ref 3.5–5.1)
PROT SERPL-MCNC: 7 G/DL (ref 6–8.4)
RBC # BLD AUTO: 4.53 M/UL (ref 4.6–6.2)
SODIUM SERPL-SCNC: 139 MMOL/L (ref 136–145)
WBC # BLD AUTO: 6.63 K/UL (ref 3.9–12.7)

## 2023-07-25 PROCEDURE — 86140 C-REACTIVE PROTEIN: CPT | Performed by: INTERNAL MEDICINE

## 2023-07-25 PROCEDURE — 80053 COMPREHEN METABOLIC PANEL: CPT | Performed by: INTERNAL MEDICINE

## 2023-07-25 PROCEDURE — 85651 RBC SED RATE NONAUTOMATED: CPT | Mod: PO | Performed by: INTERNAL MEDICINE

## 2023-07-25 PROCEDURE — 36415 COLL VENOUS BLD VENIPUNCTURE: CPT | Mod: PO | Performed by: INTERNAL MEDICINE

## 2023-07-25 PROCEDURE — 85025 COMPLETE CBC W/AUTO DIFF WBC: CPT | Performed by: INTERNAL MEDICINE

## 2023-07-26 ENCOUNTER — PATIENT MESSAGE (OUTPATIENT)
Dept: RHEUMATOLOGY | Facility: CLINIC | Age: 68
End: 2023-07-26
Payer: MEDICARE

## 2023-07-26 DIAGNOSIS — M05.79 SEROPOSITIVE RHEUMATOID ARTHRITIS OF MULTIPLE SITES: ICD-10-CM

## 2023-07-26 RX ORDER — LEFLUNOMIDE 20 MG/1
20 TABLET ORAL DAILY
Qty: 90 TABLET | Refills: 1 | Status: SHIPPED | OUTPATIENT
Start: 2023-07-26 | End: 2024-01-24

## 2023-08-08 ENCOUNTER — HOSPITAL ENCOUNTER (OUTPATIENT)
Facility: HOSPITAL | Age: 68
Discharge: HOME | End: 2023-08-08
Attending: UROLOGY | Admitting: UROLOGY
Payer: MEDICARE

## 2023-08-08 VITALS
HEIGHT: 69 IN | TEMPERATURE: 98 F | SYSTOLIC BLOOD PRESSURE: 152 MMHG | DIASTOLIC BLOOD PRESSURE: 69 MMHG | RESPIRATION RATE: 18 BRPM | WEIGHT: 231.5 LBS | HEART RATE: 60 BPM | OXYGEN SATURATION: 95 % | BODY MASS INDEX: 34.29 KG/M2

## 2023-08-08 DIAGNOSIS — N42.32 ATYPICAL SMALL ACINAR PROLIFERATION OF PROSTATE: ICD-10-CM

## 2023-08-08 LAB
BILIRUBIN, UA POC OHS: NEGATIVE
BLOOD, UA POC OHS: NEGATIVE
CLARITY, UA POC OHS: CLEAR
COLOR, UA POC OHS: YELLOW
GLUCOSE, UA POC OHS: NEGATIVE
KETONES, UA POC OHS: NEGATIVE
LEUKOCYTES, UA POC OHS: NEGATIVE
NITRITE, UA POC OHS: NEGATIVE
PH, UA POC OHS: 7
PROTEIN, UA POC OHS: NEGATIVE
SPECIFIC GRAVITY, UA POC OHS: 1.02
UROBILINOGEN, UA POC OHS: 0.2

## 2023-08-08 PROCEDURE — 25000003 PHARM REV CODE 250: Performed by: UROLOGY

## 2023-08-08 PROCEDURE — 76872 US TRANSRECTAL: CPT | Mod: 26,,, | Performed by: UROLOGY

## 2023-08-08 PROCEDURE — 55700 PR BIOPSY OF PROSTATE,NEEDLE/PUNCH: ICD-10-PCS | Mod: ,,, | Performed by: UROLOGY

## 2023-08-08 PROCEDURE — 55700 PR BIOPSY OF PROSTATE,NEEDLE/PUNCH: CPT | Mod: ,,, | Performed by: UROLOGY

## 2023-08-08 PROCEDURE — 63600175 PHARM REV CODE 636 W HCPCS

## 2023-08-08 PROCEDURE — 76872 PR US TRANSRECTAL: ICD-10-PCS | Mod: 26,,, | Performed by: UROLOGY

## 2023-08-08 PROCEDURE — 55700 HC PROSTATE NEEDLE BIOPSY: CPT

## 2023-08-08 RX ORDER — LIDOCAINE HYDROCHLORIDE 20 MG/ML
INJECTION, SOLUTION EPIDURAL; INFILTRATION; INTRACAUDAL; PERINEURAL
Status: DISCONTINUED
Start: 2023-08-08 | End: 2023-08-08 | Stop reason: HOSPADM

## 2023-08-08 RX ORDER — LIDOCAINE HYDROCHLORIDE 20 MG/ML
INJECTION, SOLUTION EPIDURAL; INFILTRATION; INTRACAUDAL; PERINEURAL
Status: DISCONTINUED | OUTPATIENT
Start: 2023-08-08 | End: 2023-08-08 | Stop reason: HOSPADM

## 2023-08-08 RX ORDER — GENTAMICIN SULFATE 40 MG/ML
INJECTION, SOLUTION INTRAMUSCULAR; INTRAVENOUS
Status: COMPLETED
Start: 2023-08-08 | End: 2023-08-08

## 2023-08-08 RX ORDER — GENTAMICIN SULFATE 40 MG/ML
80 INJECTION, SOLUTION INTRAMUSCULAR; INTRAVENOUS ONCE
Status: COMPLETED | OUTPATIENT
Start: 2023-08-08 | End: 2023-08-08

## 2023-08-08 RX ADMIN — GENTAMICIN SULFATE 80 MG: 40 INJECTION, SOLUTION INTRAMUSCULAR; INTRAVENOUS at 11:08

## 2023-08-08 NOTE — OP NOTE
[facility] Urology Operative/Brief Discharge Note     Date: 8/8/23     Staff Surgeon: Jaswinder Real MD     Pre-Op Diagnosis:   Atypical small acinar proliferation of prostate     Post-Op Diagnosis: same     Procedure(s) Performed:   Transrectal ultrasound guided prostate needle biopsy     INDICATION FOR PROCEDURE:   Prior biopsy with ASAP at Left mid     ANESTHESIA: Local periprostatic block; 10 cc 1% lidocaine     PSA: 2.4 (14.17% free)    CY 35-40g nonnodular      TRUS VOLUME: 49.75cm3  (W 51.34mm, H 34.6mm, L 53.55mm)     EBL: Minimal     SPECIMEN: 14 core prostate biopsy - right and left base, middle, apex - medial and lateral of each, with 2 total cores at LM med LM lat    ULTRASOUND FINDINGS: no med lobe, more heterogenous around LM, normal SVs     CONFIRMED PATIENT TOOK ANTIBIOTICS: Yes     CONFIRMED PATIENT NOT TAKING ASPIRIN OR ANTICOAGULANTS: Yes     CONFIRMED PATIENT USED ENEMA: Yes     PROCEDURE IN DETAIL:  After informed consent, the patient was placed in left lateral position. Gentamicin injected IM   TRUS probe inserted into rectum. Ultrasound measurements taken as above and ultrasound of prostate performed with findings as above. Noted to empty bladder well based on US. Approximately 10cc of 2% lidocaine injected bilaterally in kareen-prostatic block fashion, as well as at the apex.   14 core biopsy taken in a sextant fashion with additional cores as above to target priro path. Patient tolerated well without complication.     CONDITION: Stable     DISHARGE:  Status post uncomplicated outpatient procedure as above.   Disposition: Home.  He will follow up in 2 weeks for biopsy results.   Resume regular diet  FU 2 weeks for biopsy results and further management discussion  Return to ER if temp >101, uncontrollable urethral or rectal bleeding, or inability to urinate/urinary retention  No sex/ejaculation x3 days, no riding mowers/tractors/bikes x2-4 weeks  Drink plenty of water may see blood in urine

## 2023-08-21 ENCOUNTER — OFFICE VISIT (OUTPATIENT)
Dept: UROLOGY | Facility: CLINIC | Age: 68
End: 2023-08-21
Payer: MEDICARE

## 2023-08-21 VITALS
HEIGHT: 69 IN | WEIGHT: 220 LBS | BODY MASS INDEX: 32.58 KG/M2 | DIASTOLIC BLOOD PRESSURE: 80 MMHG | HEART RATE: 74 BPM | SYSTOLIC BLOOD PRESSURE: 141 MMHG

## 2023-08-21 DIAGNOSIS — C61 PROSTATE CANCER: Primary | ICD-10-CM

## 2023-08-21 PROCEDURE — 99214 PR OFFICE/OUTPT VISIT, EST, LEVL IV, 30-39 MIN: ICD-10-PCS | Mod: S$GLB,,, | Performed by: UROLOGY

## 2023-08-21 PROCEDURE — 3288F PR FALLS RISK ASSESSMENT DOCUMENTED: ICD-10-PCS | Mod: CPTII,S$GLB,, | Performed by: UROLOGY

## 2023-08-21 PROCEDURE — 3077F SYST BP >= 140 MM HG: CPT | Mod: CPTII,S$GLB,, | Performed by: UROLOGY

## 2023-08-21 PROCEDURE — 1126F PR PAIN SEVERITY QUANTIFIED, NO PAIN PRESENT: ICD-10-PCS | Mod: CPTII,S$GLB,, | Performed by: UROLOGY

## 2023-08-21 PROCEDURE — 99999 PR PBB SHADOW E&M-EST. PATIENT-LVL III: ICD-10-PCS | Mod: PBBFAC,,, | Performed by: UROLOGY

## 2023-08-21 PROCEDURE — 3044F HG A1C LEVEL LT 7.0%: CPT | Mod: CPTII,S$GLB,, | Performed by: UROLOGY

## 2023-08-21 PROCEDURE — 1101F PT FALLS ASSESS-DOCD LE1/YR: CPT | Mod: CPTII,S$GLB,, | Performed by: UROLOGY

## 2023-08-21 PROCEDURE — 3008F PR BODY MASS INDEX (BMI) DOCUMENTED: ICD-10-PCS | Mod: CPTII,S$GLB,, | Performed by: UROLOGY

## 2023-08-21 PROCEDURE — 3008F BODY MASS INDEX DOCD: CPT | Mod: CPTII,S$GLB,, | Performed by: UROLOGY

## 2023-08-21 PROCEDURE — 1126F AMNT PAIN NOTED NONE PRSNT: CPT | Mod: CPTII,S$GLB,, | Performed by: UROLOGY

## 2023-08-21 PROCEDURE — 4010F PR ACE/ARB THEARPY RXD/TAKEN: ICD-10-PCS | Mod: CPTII,S$GLB,, | Performed by: UROLOGY

## 2023-08-21 PROCEDURE — 1101F PR PT FALLS ASSESS DOC 0-1 FALLS W/OUT INJ PAST YR: ICD-10-PCS | Mod: CPTII,S$GLB,, | Performed by: UROLOGY

## 2023-08-21 PROCEDURE — 99999 PR PBB SHADOW E&M-EST. PATIENT-LVL III: CPT | Mod: PBBFAC,,, | Performed by: UROLOGY

## 2023-08-21 PROCEDURE — 3288F FALL RISK ASSESSMENT DOCD: CPT | Mod: CPTII,S$GLB,, | Performed by: UROLOGY

## 2023-08-21 PROCEDURE — 3077F PR MOST RECENT SYSTOLIC BLOOD PRESSURE >= 140 MM HG: ICD-10-PCS | Mod: CPTII,S$GLB,, | Performed by: UROLOGY

## 2023-08-21 PROCEDURE — 3044F PR MOST RECENT HEMOGLOBIN A1C LEVEL <7.0%: ICD-10-PCS | Mod: CPTII,S$GLB,, | Performed by: UROLOGY

## 2023-08-21 PROCEDURE — 1159F PR MEDICATION LIST DOCUMENTED IN MEDICAL RECORD: ICD-10-PCS | Mod: CPTII,S$GLB,, | Performed by: UROLOGY

## 2023-08-21 PROCEDURE — 4010F ACE/ARB THERAPY RXD/TAKEN: CPT | Mod: CPTII,S$GLB,, | Performed by: UROLOGY

## 2023-08-21 PROCEDURE — 1160F PR REVIEW ALL MEDS BY PRESCRIBER/CLIN PHARMACIST DOCUMENTED: ICD-10-PCS | Mod: CPTII,S$GLB,, | Performed by: UROLOGY

## 2023-08-21 PROCEDURE — 99214 OFFICE O/P EST MOD 30 MIN: CPT | Mod: S$GLB,,, | Performed by: UROLOGY

## 2023-08-21 PROCEDURE — 3079F DIAST BP 80-89 MM HG: CPT | Mod: CPTII,S$GLB,, | Performed by: UROLOGY

## 2023-08-21 PROCEDURE — 1159F MED LIST DOCD IN RCRD: CPT | Mod: CPTII,S$GLB,, | Performed by: UROLOGY

## 2023-08-21 PROCEDURE — 1160F RVW MEDS BY RX/DR IN RCRD: CPT | Mod: CPTII,S$GLB,, | Performed by: UROLOGY

## 2023-08-21 PROCEDURE — 3079F PR MOST RECENT DIASTOLIC BLOOD PRESSURE 80-89 MM HG: ICD-10-PCS | Mod: CPTII,S$GLB,, | Performed by: UROLOGY

## 2023-08-21 NOTE — PROGRESS NOTES
[facility] Urology Progress Note    Jaswinder Cifuentes is a 68 y.o. male who presents for fu elevated psa s/p prostate biopsy    PMHx of HTN, HLD, AP, DM, AS, RA (Dr Parra, off flutamide)     Started flomax in  with reduction in AUSS from / to 7/2 with less urgency/frequency and less nocturia in 2019 pvr 0, then fu with NP in  AUA SS 5/3, pvr 46.   Continued flomax     21: has been told by cardiology Dr Olson he may need AV replacement. Otherwise urinating well, stable on flomax. AUA SS: 5/2 (3: sleeping, 2 frequency  Dr olson 21; Some palpitations since last visit, Severe AS: Will check with TAVR clinic to see when he is to be evaluated again, at this point in time he remains asymptomatic  HTN: continue benazepril and metoprolol. FU 3 mos     22 NP f/u AUA SS:  17/3 (ICBE:2, Frequency:3, Intermittency:2, Urgency:3, Weak stream:2, Strainin, Nocturia:4). NTFx4 only while drinking during holidays, since stopping alcohol NTFx1   PSA up from 2.8 in 21 to 4.5 on 22, and recheck 3.8 (15.26% free) on 22.. Previously PSA 3.3 on 19, 1.9 on 18, 3.1 on 16, 2.2 in , 1.2 in   No family history of prostate cancer. Beer in evening increases nocturnal frequency  Still on flomax 0.4mg and feels like he is urinating well. AUA SS: 8/2 (2:  Urgency, sleeping; 1:  Emptying, frequency, intermittency, weak stream) meds help 9/10  AP - no cpap, not controlled. Echo and holter last week and holter had more than expected arrythmias, so starting 30d holter soon. CY 35g benign, some hemorrhoids     Cysto/prostate biopsy 22: volume 46.9cc, U/S: mild PVR, no med lobe, L sv more full than right, scattered hypoechoities, more on left near apex and TZ  CYSTO FINDINGS:  Significant prostate obstruction with anterolateral lobe ingrowth centrally to meet high bladder neck and elevated floor of prostate at BN with eearly extension of bilat lat lobes seen on retroflexion  "without median lobe, moderate diffuse thick trabevulations, and small cellules on posteiror wall  PATH: Focus of ASAP at [facility]  Restarted bloodthinners without issue. Feels like LUTS are well controlled on flomax     He returned July 20223 noting  PSA 2.7 (14.44% free) on 12/9/22  PSA 2.4 (14.17% free) on 6/27/23 6/2/23 HbA1c is 5.5, Cr is 1.0, eGFR >60  Followed up with Dr Olson recently, noting Hx of TAVR and has Pacemaker for pAF which interrogration in 1/23 noted <1% AF burden as did it in 4/23 so he stopped eliqus  TAVR and pacemaker was 1 yr ago today  On flomax 0.4mg daily AUA SS: 7/1 (3 frequency, 2 sleeping, 1: emptying, weak stream). Frequency more of a bother with beer intake otherwise not too bad     Restaging AS biopsy 8/8/23   CY 35-40g nonnodular ; TRUS VOLUME: 49.75cm3  SPECIMEN: 14 core prostate biopsy - right and left base, middle, apex - medial and lateral of each, with 2 total cores at [facility] lat  8/8/23 PATHOLOGY:  10. PROSTATE, LEFT MID MEDIAL, CORE BIOPSY   - PROSTATIC ADENOCARCINOMA, DIVYA SCORE 3+3=6 (GRADE GROUP 1)   - LESS THAN 1 MM (LESS THAN 5% OF SUBMITTED TISSUE), 1 OF 2 CORES   - NEGATIVE FOR PERINEURAL INVASION     1/14 cores+ low volume low grade disease at site of Prev asap [facility]  Did well after biopsy.  No fevers chills concerns or significant bleeding.      Focused Physical Exam:    Vitals:    08/21/23 1508   BP: (!) 141/80   Pulse: 74     Body mass index is 32.49 kg/m². Weight: 99.8 kg (220 lb) Height: 5' 9" (175.3 cm)     Abdomen: Soft, non-tender, nondistended, no CVA tenderness      ASSESSMENT   1. Prostate cancer  Prostate Specific Antigen, Diagnostic    Prostate Specific Antigen, Diagnostic          Plan    Extensively reviewed his diagnosis of low-grade Divya 6 prostate cancer, only 1 core positive at site of previous asap positivity 1 year ago.  No significant progression.  We did discuss that atypia has about a 40% incidence of finding prostate cancer on " future biopsy has recommendation to rebiopsy at 6-12 months, of which his 1 year biopsy noted less than 5% Divya 6 at site of previous asap.  We did review his diagnosis of prostate cancer in detail including explanation of Divya grading scale and options for management.   Discussed active surveillance versus prostatectomy versus radiation  Noted his very low risk disease with [place] 6 PSA less than 10 clinical T1c PSA density less than 0.15, less than 1/3 cores positive, less than 50% involvement of cores.  Active surveillance most appropriate, and he would like to proceed in this manner.  Discussed typical active surveillance protocols involve following PSA every 6 months, with rebiopsy at 1 year for confirmation, continuing to follow PSA every 6 months and alternating evaluation update 2-3 years with MRI and biopsy, spacing these evaluations if PSA is stable, and re intervening sooner in the case of PSA rise.  In his case, this is essentially a 1 year confirmatory biopsy at this time, and so next step would likely be MRI.  We need to check with Cardiology to see if with his pacemaker and TAVR he can have MRI.  Noted likelihood that with this, if his devices are MRI safe, would need to go to [facility] for MRI. Will check with cardiology.  Will plan Q six-month PSA lab visits with 1 year annual visit.    Level of Medical Decision Making  [ _ ]  Low: 2 minor/1 stable chronic/1 acute uncomplicated --- review record/result/order test x2  [ X ]  Mod: 1 chronic exac/2stable chronic/1 acute comp --- review record/result/order test x3 OR independent interp of outside test OR discuss mgmt of outside ordered test --- start drug therapy/plan minor surgery   [ _ ]  High: chronic with exacerbation/progression or trtmnt side effect vs acute/chronic w/ life/body threat ---  (2/3) review record/result/order test x3 OR independent interp of ouutside test OR discuss mgmt of outside ordered test --- drug with monitoring for  toxicity, plan major surgery, hospitalize, deescalate/withdraw care bc of prognosis

## 2023-09-05 ENCOUNTER — PATIENT MESSAGE (OUTPATIENT)
Dept: DERMATOLOGY | Facility: CLINIC | Age: 68
End: 2023-09-05
Payer: MEDICARE

## 2023-09-05 DIAGNOSIS — E78.2 MIXED HYPERLIPIDEMIA: ICD-10-CM

## 2023-09-05 NOTE — TELEPHONE ENCOUNTER
Care Due:                  Date            Visit Type   Department     Provider  --------------------------------------------------------------------------------                                EP -                              PRIMARY      MARY Sheikh  Last Visit: 06-      CARE ([facility])   MEDICINE       Naccari                              EP -                              PRIMARY      [facility]    Chaitanya Sheikh  Next Visit: 06-      CARE ([facility])   MEDICINE       Naccari                                                            Last  Test          Frequency    Reason                     Performed    Due Date  --------------------------------------------------------------------------------    HBA1C.......  6 months...  metFORMIN................  06- 11-    Health [facility] Embedded Care Due Messages. Reference number: [id number].   9/05/2023 9:46:24 AM CDT

## 2023-09-05 NOTE — TELEPHONE ENCOUNTER
Refill request sent to Dr. Fernández in a different encounter.    ----- Message from Mathieu Tello sent at 9/5/2023  9:35 AM CDT -----  Contact: pt  Type:  RX Refill Request    Who Called:  pt   Refill or New Rx:  refill  RX Name and Strength:  rosuvastatin (CRESTOR) 10 MG tablet  How is the patient currently taking it? (ex. 1XDay):    Is this a 30 day or 90 day RX:  90  Preferred Pharmacy with phone number:    [id number] - [place], [place] - [address] AT [facility] [address]  [address] [facility] [address]  Phone: [phone] Fax: [phone]      Local or Mail Order:  local  Ordering Provider:  Dr. Fernández   Best Call Back Number:  [phone]    Additional Information:  pt would like a refill. Please advise.

## 2023-09-06 ENCOUNTER — OFFICE VISIT (OUTPATIENT)
Dept: DERMATOLOGY | Facility: CLINIC | Age: 68
End: 2023-09-06
Payer: MEDICARE

## 2023-09-06 ENCOUNTER — PATIENT MESSAGE (OUTPATIENT)
Dept: FAMILY MEDICINE | Facility: CLINIC | Age: 68
End: 2023-09-06
Payer: MEDICARE

## 2023-09-06 DIAGNOSIS — R21 RASH: Primary | ICD-10-CM

## 2023-09-06 PROCEDURE — 11104 PR PUNCH BIOPSY, SKIN, SINGLE LESION: ICD-10-PCS | Mod: S$GLB,,, | Performed by: STUDENT IN AN ORGANIZED HEALTH CARE EDUCATION/TRAINING PROGRAM

## 2023-09-06 PROCEDURE — 99499 NO LOS: ICD-10-PCS | Mod: S$GLB,,, | Performed by: STUDENT IN AN ORGANIZED HEALTH CARE EDUCATION/TRAINING PROGRAM

## 2023-09-06 PROCEDURE — 11104 PUNCH BX SKIN SINGLE LESION: CPT | Mod: S$GLB,,, | Performed by: STUDENT IN AN ORGANIZED HEALTH CARE EDUCATION/TRAINING PROGRAM

## 2023-09-06 PROCEDURE — 88305 TISSUE EXAM BY PATHOLOGIST: CPT | Performed by: PATHOLOGY

## 2023-09-06 PROCEDURE — 88305 TISSUE EXAM BY PATHOLOGIST: ICD-10-PCS | Mod: 26,,, | Performed by: PATHOLOGY

## 2023-09-06 PROCEDURE — 99499 UNLISTED E&M SERVICE: CPT | Mod: S$GLB,,, | Performed by: STUDENT IN AN ORGANIZED HEALTH CARE EDUCATION/TRAINING PROGRAM

## 2023-09-06 PROCEDURE — 88312 PR  SPECIAL STAINS,GROUP I: ICD-10-PCS | Mod: 26,,, | Performed by: PATHOLOGY

## 2023-09-06 PROCEDURE — 88305 TISSUE EXAM BY PATHOLOGIST: CPT | Mod: 26,,, | Performed by: PATHOLOGY

## 2023-09-06 PROCEDURE — 88312 SPECIAL STAINS GROUP 1: CPT | Mod: 26,,, | Performed by: PATHOLOGY

## 2023-09-06 PROCEDURE — 88312 SPECIAL STAINS GROUP 1: CPT | Mod: 59 | Performed by: PATHOLOGY

## 2023-09-06 RX ORDER — CLOBETASOL PROPIONATE 0.5 MG/G
CREAM TOPICAL 2 TIMES DAILY
Qty: 60 G | Refills: 0 | Status: SHIPPED | OUTPATIENT
Start: 2023-09-06 | End: 2024-01-12

## 2023-09-06 NOTE — PROGRESS NOTES
Subjective:      Patient ID:  Jaswinder Cifuentes is a 68 y.o. male who presents for   Chief Complaint   Patient presents with    Rash     Rash on both legs     Patient here today for rash on both legs for mos. States rash is scaly and itchy occasionally.   States rash is worsen still a little sensitive. Has grown in area on right thigh    Patient states he uses  LAMISIL 250 MG TB  once daily and econazole nitrate 1 % cream BID states has not helped any    *PACEMAKER*        Review of Systems   Constitutional:  Negative for fever, chills and fatigue.   Respiratory:  Negative for cough and shortness of breath.    Gastrointestinal:  Negative for nausea and vomiting.   Skin:  Positive for itching and rash.       Objective:   Physical Exam   Constitutional: He appears well-developed and well-nourished.   Neurological: He is alert and oriented to person, place, and time.   Psychiatric: He has a normal mood and affect.   Skin:   Areas Examined (abnormalities noted in diagram):   Abdomen Inspection Performed  Back Inspection Performed  RUE Inspected  LUE Inspection Performed  RLE Inspected  LLE Inspection Performed            Diagram Legend     Erythematous scaling macule/papule c/w actinic keratosis       Vascular papule c/w angioma      Pigmented verrucoid papule/plaque c/w seborrheic keratosis      Yellow umbilicated papule c/w sebaceous hyperplasia      Irregularly shaped tan macule c/w lentigo     1-2 mm smooth white papules consistent with Milia      Movable subcutaneous cyst with punctum c/w epidermal inclusion cyst      Subcutaneous movable cyst c/w pilar cyst      Firm pink to brown papule c/w dermatofibroma      Pedunculated fleshy papule(s) c/w skin tag(s)      Evenly pigmented macule c/w junctional nevus     Mildly variegated pigmented, slightly irregular-bordered macule c/w mildly atypical nevus      Flesh colored to evenly pigmented papule c/w intradermal nevus       Pink pearly papule/plaque c/w basal cell  carcinoma      Erythematous hyperkeratotic cursted plaque c/w SCC      Surgical scar with no sign of skin cancer recurrence      Open and closed comedones      Inflammatory papules and pustules      Verrucoid papule consistent consistent with wart     Erythematous eczematous patches and plaques     Dystrophic onycholytic nail with subungual debris c/w onychomycosis     Umbilicated papule    Erythematous-base heme-crusted tan verrucoid plaque consistent with inflamed seborrheic keratosis     Erythematous Silvery Scaling Plaque c/w Psoriasis     See annotation        Assessment / Plan:      Pathology Orders:       Normal Orders This Visit    Specimen to Pathology, Dermatology     Questions:    Procedure Type: Dermatology and skin neoplasms    Number of Specimens: 1    ------------------------: -------------------------    Spec 1 Procedure: Biopsy    Spec 1 Clinical Impression: eczema r/o tinea    Spec 1 Source: right lateral thigh    Release to patient:           Rash  -     clobetasoL (TEMOVATE) 0.05 % cream; Apply topically 2 (two) times daily.  Dispense: 60 g; Refill: 0  -     Specimen to Pathology, Dermatology  Punch biopsy procedure note:  Punch biopsy performed after verbal consent obtained. Area marked and prepped with alcohol. Approximately 1cc of 2% lidocaine with epinephrine injected. 5 mm disposable punch used to remove lesion. Hemostasis obtained and biopsy site closed with 1 - 2 Prolene sutures. Wound care instructions reviewed with patient and handout given.             No follow-ups on file.

## 2023-09-06 NOTE — PATIENT INSTRUCTIONS
Punch Biopsy Wound Care    Your doctor has performed a punch biopsy today.  A band aid and antibiotic ointment has been placed over the site.  This should remain in place for 24 hours.  It is recommended that you keep the area dry for the first 24 hours.  After 24 hours, you may remove the band aid and wash the area with warm soap and water and apply Vaseline jelly.  Many patients prefer to use Neosporin or Bacitracin ointment.  This is acceptable; however know that you can develop an allergy to this medication even if you have used it safely for years.  It is important to keep the area moist.  Letting it dry out and get air slows healing time, will worsen the scar, and make it more difficult to remove the stitches if they were placed.  Band aid is optional after first 24 hours.      If you notice increasing redness, tenderness, pain, or yellow drainage at the biopsy or surgical site, please notify your doctor.  These are signs of an infection.    If your biopsy/surgical site is bleeding, apply firm pressure for 15 minutes straight.  Repeat for another 15 minutes, if it is still bleeding.   If the surgical site continues to bleed, then please contact your doctor.      [address] [place] [address]/ [phone] FAX/ [email or web address]

## 2023-09-07 RX ORDER — ROSUVASTATIN CALCIUM 10 MG/1
10 TABLET, COATED ORAL DAILY
Qty: 90 TABLET | Refills: 3 | Status: SHIPPED | OUTPATIENT
Start: 2023-09-07

## 2023-09-15 ENCOUNTER — TELEPHONE (OUTPATIENT)
Dept: DERMATOLOGY | Facility: CLINIC | Age: 68
End: 2023-09-15
Payer: MEDICARE

## 2023-09-15 LAB
FINAL PATHOLOGIC DIAGNOSIS: NORMAL
Lab: NORMAL

## 2023-09-15 NOTE — TELEPHONE ENCOUNTER
----- Message from Amparo Darling, Patient Care Assistant sent at 9/15/2023  1:10 PM CDT -----  Regarding: returning call  Contact: pt  Type:  Patient Returning Call    Who Called:  pt     Who Left Message for Patient:  not sure     Does the patient know what this is regarding?:  yes     Best Call Back Number:  [phone] (home)     Additional Information:  please call pt to advise. Thanks!

## 2023-09-20 ENCOUNTER — CLINICAL SUPPORT (OUTPATIENT)
Dept: DERMATOLOGY | Facility: CLINIC | Age: 68
End: 2023-09-20
Payer: MEDICARE

## 2023-09-20 DIAGNOSIS — R21 RASH: Primary | ICD-10-CM

## 2023-09-20 PROCEDURE — 99024 POSTOP FOLLOW-UP VISIT: CPT | Mod: S$GLB,,, | Performed by: STUDENT IN AN ORGANIZED HEALTH CARE EDUCATION/TRAINING PROGRAM

## 2023-09-20 PROCEDURE — 99024 PR POST-OP FOLLOW-UP VISIT: ICD-10-PCS | Mod: S$GLB,,, | Performed by: STUDENT IN AN ORGANIZED HEALTH CARE EDUCATION/TRAINING PROGRAM

## 2023-09-28 NOTE — TELEPHONE ENCOUNTER
[facility] RADIATION ONCOLOGY  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  We have recommended radiation therapy treatment of the Breast.    Simulation Procedure:  The patient was taken to the CT-simulation suite and placed in the supine position.  A custom angled breast board immobilization device was utilized to ensure accurate daily reproducibility of treatment.  Preliminary markings were made by me to designate the appropriate area for the CT scan.  The radiation therapist using the laser alignment system made preliminary markings and measurements. A limited field, CT scan of the thorax was obtained for treatment planning purposes.     An arbitrary center was set within the target volume.  This was marked on the patient.  A virtual-reality simulation will be performed, and the patient will then return for simulation of the fields and blocks on the treatment table.    In order to proceed with simulation, the following orders were given for immobilization devices, contrast, and other imaging studies needed as below:      9/21/2023     1:10 PM   Radiation Orders   2.5MM SLICE THICKNESS Yes   BREAST BOARD - SUPINE Yes   COMPLEX CLINICAL TX PLAN Yes   COMPLEX TX DEVICE Yes   PORT FILM DAILY Yes   SIMULATION COMPLEX Yes   SUPINE Yes        This technique and approach are necessary to target the tumor  Forward to Dr. Parra.   volume and minimize exposure of normal tissue.    Additional Information: Setup instruction, immobilization device information, contrast information and other simulation details are given below.

## 2023-10-10 ENCOUNTER — OFFICE VISIT (OUTPATIENT)
Dept: DERMATOLOGY | Facility: CLINIC | Age: 68
End: 2023-10-10
Payer: MEDICARE

## 2023-10-10 DIAGNOSIS — L85.3 XEROSIS CUTIS: Primary | ICD-10-CM

## 2023-10-10 DIAGNOSIS — L30.8 OTHER ECZEMA: ICD-10-CM

## 2023-10-10 DIAGNOSIS — L81.9 DYSPIGMENTATION: ICD-10-CM

## 2023-10-10 PROCEDURE — 3288F PR FALLS RISK ASSESSMENT DOCUMENTED: ICD-10-PCS | Mod: CPTII,S$GLB,, | Performed by: STUDENT IN AN ORGANIZED HEALTH CARE EDUCATION/TRAINING PROGRAM

## 2023-10-10 PROCEDURE — 4010F PR ACE/ARB THEARPY RXD/TAKEN: ICD-10-PCS | Mod: CPTII,S$GLB,, | Performed by: STUDENT IN AN ORGANIZED HEALTH CARE EDUCATION/TRAINING PROGRAM

## 2023-10-10 PROCEDURE — 99213 OFFICE O/P EST LOW 20 MIN: CPT | Mod: S$GLB,,, | Performed by: STUDENT IN AN ORGANIZED HEALTH CARE EDUCATION/TRAINING PROGRAM

## 2023-10-10 PROCEDURE — 1101F PT FALLS ASSESS-DOCD LE1/YR: CPT | Mod: CPTII,S$GLB,, | Performed by: STUDENT IN AN ORGANIZED HEALTH CARE EDUCATION/TRAINING PROGRAM

## 2023-10-10 PROCEDURE — 1159F MED LIST DOCD IN RCRD: CPT | Mod: CPTII,S$GLB,, | Performed by: STUDENT IN AN ORGANIZED HEALTH CARE EDUCATION/TRAINING PROGRAM

## 2023-10-10 PROCEDURE — 3044F HG A1C LEVEL LT 7.0%: CPT | Mod: CPTII,S$GLB,, | Performed by: STUDENT IN AN ORGANIZED HEALTH CARE EDUCATION/TRAINING PROGRAM

## 2023-10-10 PROCEDURE — 3288F FALL RISK ASSESSMENT DOCD: CPT | Mod: CPTII,S$GLB,, | Performed by: STUDENT IN AN ORGANIZED HEALTH CARE EDUCATION/TRAINING PROGRAM

## 2023-10-10 PROCEDURE — 1160F RVW MEDS BY RX/DR IN RCRD: CPT | Mod: CPTII,S$GLB,, | Performed by: STUDENT IN AN ORGANIZED HEALTH CARE EDUCATION/TRAINING PROGRAM

## 2023-10-10 PROCEDURE — 1126F AMNT PAIN NOTED NONE PRSNT: CPT | Mod: CPTII,S$GLB,, | Performed by: STUDENT IN AN ORGANIZED HEALTH CARE EDUCATION/TRAINING PROGRAM

## 2023-10-10 PROCEDURE — 1101F PR PT FALLS ASSESS DOC 0-1 FALLS W/OUT INJ PAST YR: ICD-10-PCS | Mod: CPTII,S$GLB,, | Performed by: STUDENT IN AN ORGANIZED HEALTH CARE EDUCATION/TRAINING PROGRAM

## 2023-10-10 PROCEDURE — 1160F PR REVIEW ALL MEDS BY PRESCRIBER/CLIN PHARMACIST DOCUMENTED: ICD-10-PCS | Mod: CPTII,S$GLB,, | Performed by: STUDENT IN AN ORGANIZED HEALTH CARE EDUCATION/TRAINING PROGRAM

## 2023-10-10 PROCEDURE — 1126F PR PAIN SEVERITY QUANTIFIED, NO PAIN PRESENT: ICD-10-PCS | Mod: CPTII,S$GLB,, | Performed by: STUDENT IN AN ORGANIZED HEALTH CARE EDUCATION/TRAINING PROGRAM

## 2023-10-10 PROCEDURE — 1159F PR MEDICATION LIST DOCUMENTED IN MEDICAL RECORD: ICD-10-PCS | Mod: CPTII,S$GLB,, | Performed by: STUDENT IN AN ORGANIZED HEALTH CARE EDUCATION/TRAINING PROGRAM

## 2023-10-10 PROCEDURE — 3044F PR MOST RECENT HEMOGLOBIN A1C LEVEL <7.0%: ICD-10-PCS | Mod: CPTII,S$GLB,, | Performed by: STUDENT IN AN ORGANIZED HEALTH CARE EDUCATION/TRAINING PROGRAM

## 2023-10-10 PROCEDURE — 99213 PR OFFICE/OUTPT VISIT, EST, LEVL III, 20-29 MIN: ICD-10-PCS | Mod: S$GLB,,, | Performed by: STUDENT IN AN ORGANIZED HEALTH CARE EDUCATION/TRAINING PROGRAM

## 2023-10-10 PROCEDURE — 4010F ACE/ARB THERAPY RXD/TAKEN: CPT | Mod: CPTII,S$GLB,, | Performed by: STUDENT IN AN ORGANIZED HEALTH CARE EDUCATION/TRAINING PROGRAM

## 2023-10-10 NOTE — PROGRESS NOTES
Subjective:      Patient ID:  Jaswinder Cifuentes is a 68 y.o. male who presents for   Chief Complaint   Patient presents with    Follow-up     rash     LOV 9/6/23    Patient here today for f/u on rash on right thigh. Patient states it is improving but can still see discoloration. Itching has mostly resolved. Used clobetasol BID for approx 4-5 weeks, has now discontinued and is only using moisturizer.    Final Pathologic Diagnosis RELIAPATH DIAGNOSIS:     SKIN, RIGHT LATERAL THIGH, PUNCH BIOPSY:   - Spongiotic dermatitis, see comment.   - No fungal organisms identified with PAS-F and GMS special stains.     COMMENT:   Histologic sections show a punch biopsy that includes deep reticular dermis. There is focal parakeratosis with loculated serum in the stratum corneum. The epidermis exhibits mild diffuse spongiosis with focal exocytosis of lymphocytes. A predominantly   perivascular lymphohistiocytic infiltrate admixed with scattered eosinophils is present in the superficial dermis. No fungal organisms are identified by special stains, however, organisms can sometimes be difficult to find on histologic sections. The   histologic differential diagnosis includes contact dermatitis and other eczematous dermatitides. Clinical correlation is recommended.     *PACEMAKER*        Review of Systems   Constitutional:  Negative for fever, chills and fatigue.   Respiratory:  Negative for cough and shortness of breath.    Gastrointestinal:  Negative for nausea and vomiting.   Skin:  Positive for itching and rash.       Objective:   Physical Exam   Constitutional: He appears well-developed and well-nourished.   Neurological: He is alert and oriented to person, place, and time.   Psychiatric: He has a normal mood and affect.   Skin:   Areas Examined (abnormalities noted in diagram):   RLE Inspected  LLE Inspection Performed            Diagram Legend     Erythematous scaling macule/papule c/w actinic keratosis       Vascular papule  c/w angioma      Pigmented verrucoid papule/plaque c/w seborrheic keratosis      Yellow umbilicated papule c/w sebaceous hyperplasia      Irregularly shaped tan macule c/w lentigo     1-2 mm smooth white papules consistent with Milia      Movable subcutaneous cyst with punctum c/w epidermal inclusion cyst      Subcutaneous movable cyst c/w pilar cyst      Firm pink to brown papule c/w dermatofibroma      Pedunculated fleshy papule(s) c/w skin tag(s)      Evenly pigmented macule c/w junctional nevus     Mildly variegated pigmented, slightly irregular-bordered macule c/w mildly atypical nevus      Flesh colored to evenly pigmented papule c/w intradermal nevus       Pink pearly papule/plaque c/w basal cell carcinoma      Erythematous hyperkeratotic cursted plaque c/w SCC      Surgical scar with no sign of skin cancer recurrence      Open and closed comedones      Inflammatory papules and pustules      Verrucoid papule consistent consistent with wart     Erythematous eczematous patches and plaques     Dystrophic onycholytic nail with subungual debris c/w onychomycosis     Umbilicated papule    Erythematous-base heme-crusted tan verrucoid plaque consistent with inflamed seborrheic keratosis     Erythematous Silvery Scaling Plaque c/w Psoriasis     See annotation      Assessment / Plan:        Xerosis cutis  Other eczema  Dyspigmentation  Biopsy consistent with eczema- patch resolved with moisturizing cream and clobetasol BID x 2 weeks, he has since discontinued clobetasol  Continue OTC moisturizing cream such as cerave daily after showering  Has slight postinflammatory pigmentary alteration which should fade with time  Good skin care regimen discussed including limiting to one bath or shower/day, using lukewarm water with mild soap and moisturizing cream to skin 1 - 2x/day.               No follow-ups on file.

## 2024-01-06 NOTE — TELEPHONE ENCOUNTER
Care Due:                  Date            Visit Type   Department     Provider  --------------------------------------------------------------------------------                                EP -                              PRIMARY      MARY Sheikh  Last Visit: 06-      CARE ([facility])   MEDICINE       Naccari                              EP -                              PRIMARY      [facility]    Chaitanya Sheikh  Next Visit: 06-      CARE ([facility])   MEDICINE       Naccari                                                            Last  Test          Frequency    Reason                     Performed    Due Date  --------------------------------------------------------------------------------    HBA1C.......  6 months...  metFORMIN................  06- 11-    Health [facility] Embedded Care Due Messages. Reference number: [id number].   1/06/2024 2:28:41 PM CST

## 2024-01-08 NOTE — TELEPHONE ENCOUNTER
Refill Routing Note   Medication(s) are not appropriate for processing by [facility] for the following reason(s):        Required labs outdated    [facility] action(s):  Defer     Requires labs : Yes             Appointments  past 12m or future 3m with PCP    Date Provider   Last Visit   6/2/2023 Chaitanya Fernández MD   Next Visit   6/5/2024 Chaitanya Fernández MD   ED visits in past 90 days: 0        Note composed:11:42 AM 01/08/2024

## 2024-01-09 RX ORDER — METFORMIN HYDROCHLORIDE 500 MG/1
500 TABLET, EXTENDED RELEASE ORAL
Qty: 90 TABLET | Refills: 4 | Status: SHIPPED | OUTPATIENT
Start: 2024-01-09

## 2024-01-12 ENCOUNTER — OFFICE VISIT (OUTPATIENT)
Dept: FAMILY MEDICINE | Facility: CLINIC | Age: 69
End: 2024-01-12
Payer: MEDICARE

## 2024-01-12 VITALS
SYSTOLIC BLOOD PRESSURE: 132 MMHG | DIASTOLIC BLOOD PRESSURE: 86 MMHG | TEMPERATURE: 98 F | HEIGHT: 69 IN | WEIGHT: 230.63 LBS | HEART RATE: 69 BPM | OXYGEN SATURATION: 96 % | BODY MASS INDEX: 34.16 KG/M2

## 2024-01-12 DIAGNOSIS — M62.81 MUSCLE LEFT ARM WEAKNESS: Primary | ICD-10-CM

## 2024-01-12 DIAGNOSIS — R29.898 WEAKNESS OF SHOULDER: ICD-10-CM

## 2024-01-12 DIAGNOSIS — M05.79 SEROPOSITIVE RHEUMATOID ARTHRITIS OF MULTIPLE SITES: ICD-10-CM

## 2024-01-12 DIAGNOSIS — I10 GOOD HYPERTENSION CONTROL: ICD-10-CM

## 2024-01-12 PROCEDURE — 3075F SYST BP GE 130 - 139MM HG: CPT | Mod: CPTII,S$GLB,,

## 2024-01-12 PROCEDURE — 3288F FALL RISK ASSESSMENT DOCD: CPT | Mod: CPTII,S$GLB,,

## 2024-01-12 PROCEDURE — 99214 OFFICE O/P EST MOD 30 MIN: CPT | Mod: S$GLB,,,

## 2024-01-12 PROCEDURE — 99999 PR PBB SHADOW E&M-EST. PATIENT-LVL V: CPT | Mod: PBBFAC,,,

## 2024-01-12 PROCEDURE — 3079F DIAST BP 80-89 MM HG: CPT | Mod: CPTII,S$GLB,,

## 2024-01-12 PROCEDURE — 1159F MED LIST DOCD IN RCRD: CPT | Mod: CPTII,S$GLB,,

## 2024-01-12 PROCEDURE — 1160F RVW MEDS BY RX/DR IN RCRD: CPT | Mod: CPTII,S$GLB,,

## 2024-01-12 PROCEDURE — 3008F BODY MASS INDEX DOCD: CPT | Mod: CPTII,S$GLB,,

## 2024-01-12 PROCEDURE — 1101F PT FALLS ASSESS-DOCD LE1/YR: CPT | Mod: CPTII,S$GLB,,

## 2024-01-12 RX ORDER — METHYLPREDNISOLONE 4 MG/1
TABLET ORAL
Qty: 21 EACH | Refills: 0 | Status: SHIPPED | OUTPATIENT
Start: 2024-01-12 | End: 2024-02-02

## 2024-01-12 NOTE — PROGRESS NOTES
Subjective:       Patient ID: Jaswinder Cifuentes is a 68 y.o. male.    Chief Complaint: Extremity Weakness (left)    Patient presents to the clinic with complaint of limited range of motion to left arm.     States started around 5 days ago. Denies any shoulder pain. States he does feel a strange sensation to his left shoulder. He is able to lift his arm up all the way if he uses his right arm to assist. He does have a cracking noise to left arm when he moves it. He is only able to lift his left arm 90 degrees.     Has no other complaints or concerns today.     Patient educated on plan of care, verbalized understanding.         Review of Systems   Constitutional:  Negative for activity change, appetite change, chills, diaphoresis and fever.   HENT:  Negative for congestion, ear pain, postnasal drip, sinus pressure, sneezing and sore throat.    Eyes:  Negative for pain, discharge, redness and itching.   Respiratory:  Negative for apnea, cough, chest tightness, shortness of breath and wheezing.    Cardiovascular:  Negative for chest pain and leg swelling.   Gastrointestinal:  Negative for abdominal distention, abdominal pain, constipation, diarrhea, nausea and vomiting.   Genitourinary:  Negative for difficulty urinating, dysuria, flank pain and frequency.   Musculoskeletal:         Left arm limited range of motion   Skin:  Negative for color change, rash and wound.   Neurological:  Negative for dizziness.   All other systems reviewed and are negative.      Patient Active Problem List   Diagnosis    Seropositive rheumatoid arthritis of multiple sites    Long-term use of high-risk medication    Pre-diabetes    Good hypertension control    Mixed hyperlipidemia    Benign prostatic hyperplasia without lower urinary tract symptoms    Body mass index (BMI) of 33.0-33.9 in adult    Heart murmur    Paroxysmal atrial fibrillation    Drug-induced immunodeficiency       Objective:      Physical Exam  Vitals and nursing note  reviewed.   Constitutional:       Appearance: Normal appearance. He is not ill-appearing.   HENT:      Head: Normocephalic and atraumatic.      Nose: Nose normal.   Eyes:      General: Lids are normal.   Cardiovascular:      Rate and Rhythm: Normal rate and regular rhythm.      Pulses: Normal pulses.      Heart sounds: Normal heart sounds.   Pulmonary:      Effort: Pulmonary effort is normal. No tachypnea or respiratory distress.      Breath sounds: Normal breath sounds. No wheezing.   Abdominal:      General: Bowel sounds are normal. There is no distension.      Palpations: Abdomen is soft.      Tenderness: There is no abdominal tenderness.   Musculoskeletal:      Left shoulder: No swelling, deformity, effusion or tenderness. Decreased range of motion. Decreased strength.      Cervical back: Full passive range of motion without pain and normal range of motion.      Left lower leg: No edema.   Skin:     General: Skin is warm and dry.   Neurological:      Mental Status: He is alert and oriented to person, place, and time.   Psychiatric:         Mood and Affect: Mood normal.         Behavior: Behavior normal.         Lab Results   Component Value Date    WBC 6.63 07/25/2023    HGB 13.2 (L) 07/25/2023    HCT 40.0 07/25/2023     07/25/2023    CHOL 151 06/02/2023    TRIG 90 06/02/2023    HDL 31 (L) 06/02/2023    ALT 22 07/25/2023    AST 23 07/25/2023     07/25/2023    K 4.4 07/25/2023     07/25/2023    CREATININE 0.9 07/25/2023    BUN 15 07/25/2023    CO2 24 07/25/2023    TSH 3.602 06/02/2023    PSA 4.5 (H) 02/01/2022    HGBA1C 5.5 06/02/2023     The 10-year ASCVD risk score (Beckie GREEN, et al., 2019) is: 17.8%    Values used to calculate the score:      Age: 68 years      Sex: Male      Is Non- : No      Diabetic: No      Tobacco smoker: No      Systolic Blood Pressure: 132 mmHg      Is BP treated: No      HDL Cholesterol: 31 mg/dL      Total Cholesterol: 151 mg/dL  Visit  "Vitals  /86 (BP Location: Right arm, Patient Position: Sitting, BP Method: Large (Manual))   Pulse 69   Temp 98.1 °F (36.7 °C) (Oral)   Ht 5' 9" (1.753 m)   Wt 104.6 kg (230 lb 9.6 oz)   SpO2 96%   BMI 34.05 kg/m²      Assessment:       1. Muscle left arm weakness    2. Weakness of shoulder    3. Good hypertension control    4. Seropositive rheumatoid arthritis of multiple sites        Plan:       1. Muscle left arm weakness/Weakness of shoulder  -     methylPREDNISolone (MEDROL DOSEPACK) 4 mg tablet; use as directed  Dispense: 21 each; Refill: 0  -     Ambulatory referral/consult to Physical/Occupational Therapy; Future; Expected date: 01/19/2024    2. Good hypertension control   - Stable-Continue Benazepril and Metoprolol   - Continue current plan of care    3. Seropositive rheumatoid arthritis of multiple sites   - Stable   - Continue current plan of care   - Follow up with Rheumatology       Follow up if symptoms worsen or fail to improve.      Future Appointments       Date Provider Specialty Appt Notes    6/5/2024 Chaitanya Fernández MD Family Medicine annual             "

## 2024-01-12 NOTE — PATIENT INSTRUCTIONS

## 2024-01-22 ENCOUNTER — PATIENT MESSAGE (OUTPATIENT)
Dept: FAMILY MEDICINE | Facility: CLINIC | Age: 69
End: 2024-01-22
Payer: MEDICARE

## 2024-01-22 ENCOUNTER — CLINICAL SUPPORT (OUTPATIENT)
Dept: REHABILITATION | Facility: HOSPITAL | Age: 69
End: 2024-01-22
Payer: MEDICARE

## 2024-01-22 DIAGNOSIS — M62.81 MUSCLE LEFT ARM WEAKNESS: ICD-10-CM

## 2024-01-22 DIAGNOSIS — R29.898 WEAKNESS OF SHOULDER: Primary | ICD-10-CM

## 2024-01-22 DIAGNOSIS — R29.898 WEAKNESS OF SHOULDER: ICD-10-CM

## 2024-01-22 PROCEDURE — 97165 OT EVAL LOW COMPLEX 30 MIN: CPT | Mod: PN

## 2024-01-22 NOTE — PLAN OF CARE
[facility] Occupational Therapy  Initial Evaluation     Date: 1/22/2024  Name: Jaswinder Cifuentes  Clinic Number: [id number]    Therapy Diagnosis:   Encounter Diagnoses   Name Primary?    Muscle left arm weakness     Weakness of shoulder      Physician: Carla Palencia NP    Physician Orders: evaluate and tx, see epic  Medical Diagnosis: left arm weakness, left shoulder weakness  Surgical Procedure and Date: n/a, n/a  Evaluation Date: 1/22/2024  Insurance Authorization Period Expiration: referral [id number] 1-12-24 thru 1-11-25              Plan of Care Certification Period: 1-22-24 thru 4-15-24                           Date of Return to referral source's office: see epic    Visit # / Visits authorized: 1 / 1 referral [id number] 1-12-24 thru 1-11-25  Time In:855  Time Out: 915  Total Billable Time: 0 minutes  Precautions:  universal, see epic, protocol if applicable  Subjective     Involved Side: left  Dominant Side: right  Date of Onset: about 5 days pre 1-12-24  History of Current Condition/Mechanism of Injury: insidious onset; saw primary care, prescribed medrol dosepack, therapy ordered (no scans done)  Imaging: see epic  Previous Therapy: none for above dx    Past Medical History/Physical Systems Review:   Jaswinder Cifuentes  has a past medical history of Aortic stenosis, Atrial fibrillation, BPH with obstruction/lower urinary tract symptoms, Diabetes mellitus, type 2, Hyperlipidemia, Hypertension, Pacemaker, Seropositive rheumatoid arthritis, and Sleep apnea.    Jaswinder Cifuentes  has a past surgical history that includes ANGIOGRAM, CORONARY, WITH LEFT HEART CATHETERIZATION (01/2021); Refractive surgery (Bilateral); Transrectal biopsy of prostate with ultrasound guidance (N/A, 5/31/2022); Cystoscopy (N/A, 5/31/2022); Transrectal ultrasound examination (N/A, 8/8/2023); and Prostate biopsy (N/A, 8/8/2023).    Jaswinder has a current medication list which includes the following prescription(s):  acetaminophen, aspirin, benazepril, blood sugar diagnostic, cyanocobalamin, leflunomide, metformin, methylprednisolone, metoprolol succinate, omeprazole, pyridoxine (vitamin b6), rosuvastatin, tamsulosin, and vitamin d, and the following Facility-Administered Medications: gentamicin.    Review of patient's allergies indicates:  No Known Allergies     Patient's Goals for Therapy: full painless use    Pain:  Functional Pain Scale Rating 0-10:   0/10 on average  0/10 at best  2/10 at worst  Side:left  Location: shoulder  Description: ache  Aggravating Factors: sidelying  Easing Factors: stop sidelying  Occupation:  retired  Working presently: no  Duties: per job if working; typical self and home care    Functional Limitations/Social History:    Previous functional status includes: Independent with all ADLs.     Current Functional Status   Home/Living environment : lives with nobody    Limitation of Functional Status as follows: decreased motion, use, and strength with ADL   ADLs/IADLs:               See above   Leisure: not tested  pain meds: denies  nicotine: denies  caffeine: 2-3 cups regular coffee daily    Objective   Posture:roque deferred  Palpation:not tested  Sensation:intermittent diffuse tingling at shoulder  ROM:    Full prom for er 0, 45, 90 abd as well as sidelying IR, IR behind back, abd, d2, supine hands behind head, and flex vs unaffected side    End range passive flex with painless clunk    Active elevation severely limited    Special tests:(+) er lag sign on left with crunching like symptoms reported          CMS Impairment/Limitation/Restriction for FOTO Survey    Not done/1 time visit only                 Treatment                   Home Exercise Program/Education:    Patient/Family Education: I am concerned about soft tissue compromise. I recommended he contact referring provider so they can review my evaluation and determine how moving forward should be handled.  Assessment     Jaswinder BONILLA  Esau is a 68 y.o. male referred to outpatient occupational therapy and presents with a medical diagnosis of see above resulting in Decreased muscle strength and Diminished/Impaired Sensation and demonstrates limitations as described in the chart below. Following medical record review it is determined that pt will not benefit from occupational therapy services at this tme.    Pt has no cultural, educational or language barriers to learning provided.    Profile and History Assessment of Occupational Performance Level of Clinical Decision Making Complexity Score   Occupational Profile:   Jaswinder Cifuentes is a 68 y.o. male who lives alone and is retired Jaswinder Cifuentes has difficulty with  ADLs and IADLs as listed previously, which  affecting his/her daily functional abilities.      Comorbidities:    has a past medical history of Aortic stenosis, Atrial fibrillation, BPH with obstruction/lower urinary tract symptoms, Diabetes mellitus, type 2, Hyperlipidemia, Hypertension, Pacemaker, Seropositive rheumatoid arthritis, and Sleep apnea.    Medical and Therapy History Review:   Brief               Performance Deficits    Physical:  Muscle Power/Strength    Cognitive:  No Deficits    Psychosocial:    No Deficits     Clinical Decision Making:  low    Assessment Process:  Problem-Focused Assessments    Modification/Need for Assistance:  Not Necessary    Intervention Selection:  Limited Treatment Options       low  Based on PMHX, co morbidities , data from assessments and functional level of assistance required with task and clinical presentation directly impacting function.             Plan   Certification Period/Plan of care expiration: 1-22-24 thru 4-15-24    Per referring provider's instruction    Geoffrey GARLAND CHT

## 2024-01-24 DIAGNOSIS — M05.79 SEROPOSITIVE RHEUMATOID ARTHRITIS OF MULTIPLE SITES: ICD-10-CM

## 2024-01-24 RX ORDER — LEFLUNOMIDE 20 MG/1
20 TABLET ORAL
Qty: 90 TABLET | Refills: 0 | Status: SHIPPED | OUTPATIENT
Start: 2024-01-24 | End: 2024-04-24

## 2024-01-25 ENCOUNTER — DOCUMENTATION ONLY (OUTPATIENT)
Dept: REHABILITATION | Facility: HOSPITAL | Age: 69
End: 2024-01-25
Payer: MEDICARE

## 2024-01-25 NOTE — PROGRESS NOTES
Outpatient Therapy Discharge Summary     Date: 1-25-24      [id number]    Therapy Diagnosis: No diagnosis found.  Physician: No ref. provider found    Physician Orders: see evaluation  Medical Diagnosis: see evaluation  Evaluation Date: 1-22-24      Date of Last visit: 1-22-24  Total Visits Received: 1  Cancelled Visits: see epic  No Show Visits: see epic    Assessment    Goals: see eval    Discharge reason: Other:  referred back to referring provider/see epic    Plan   This patient is discharged from Occupational Therapy

## 2024-02-28 RX ORDER — OMEPRAZOLE 20 MG/1
CAPSULE, DELAYED RELEASE ORAL
Qty: 90 CAPSULE | Refills: 1 | Status: SHIPPED | OUTPATIENT
Start: 2024-02-28

## 2024-02-28 NOTE — TELEPHONE ENCOUNTER
Care Due:                  Date            Visit Type   Department     Provider  --------------------------------------------------------------------------------                                EP -                              PRIMARY      MARY Sheikh  Last Visit: 06-      CARE ([facility])   MEDICINE       Naccari                              EP -                              PRIMARY      [facility]    Chaitanya Sheikh  Next Visit: 06-      CARE ([facility])   MEDICINE       Naccari                                                            Last  Test          Frequency    Reason                     Performed    Due Date  --------------------------------------------------------------------------------    Lipid Panel.  12 months..  rosuvastatin.............  06- 05-    Health [facility] Embedded Care Due Messages. Reference number: [id number].   2/28/2024 1:26:19 PM CST

## 2024-02-28 NOTE — TELEPHONE ENCOUNTER
Refill Decision Note   Jaswinder Cifuentes  is requesting a refill authorization.  Brief Assessment and Rationale for Refill:  Approve     Medication Therapy Plan:         Comments:     Note composed:2:31 PM 02/28/2024

## 2024-04-01 ENCOUNTER — OFFICE VISIT (OUTPATIENT)
Dept: RHEUMATOLOGY | Facility: CLINIC | Age: 69
End: 2024-04-01
Payer: MEDICARE

## 2024-04-01 ENCOUNTER — LAB VISIT (OUTPATIENT)
Dept: LAB | Facility: HOSPITAL | Age: 69
End: 2024-04-01
Attending: UROLOGY
Payer: MEDICARE

## 2024-04-01 VITALS
WEIGHT: 227.06 LBS | HEIGHT: 69 IN | HEART RATE: 68 BPM | SYSTOLIC BLOOD PRESSURE: 148 MMHG | BODY MASS INDEX: 33.63 KG/M2 | DIASTOLIC BLOOD PRESSURE: 79 MMHG

## 2024-04-01 DIAGNOSIS — M05.79 SEROPOSITIVE RHEUMATOID ARTHRITIS OF MULTIPLE SITES: ICD-10-CM

## 2024-04-01 DIAGNOSIS — Z79.899 LONG-TERM USE OF HIGH-RISK MEDICATION: ICD-10-CM

## 2024-04-01 DIAGNOSIS — C61 PROSTATE CANCER: ICD-10-CM

## 2024-04-01 DIAGNOSIS — M05.79 SEROPOSITIVE RHEUMATOID ARTHRITIS OF MULTIPLE SITES: Primary | ICD-10-CM

## 2024-04-01 DIAGNOSIS — M54.10 BRACHIAL NEURITIS OF LEFT UPPER EXTREMITY: ICD-10-CM

## 2024-04-01 LAB
ALBUMIN SERPL BCP-MCNC: 4 G/DL (ref 3.5–5.2)
ALP SERPL-CCNC: 107 U/L (ref 55–135)
ALT SERPL W/O P-5'-P-CCNC: 22 U/L (ref 10–44)
ANION GAP SERPL CALC-SCNC: 13 MMOL/L (ref 8–16)
AST SERPL-CCNC: 27 U/L (ref 10–40)
BASOPHILS # BLD AUTO: 0.05 K/UL (ref 0–0.2)
BASOPHILS NFR BLD: 0.6 % (ref 0–1.9)
BILIRUB SERPL-MCNC: 0.7 MG/DL (ref 0.1–1)
BUN SERPL-MCNC: 13 MG/DL (ref 8–23)
CALCIUM SERPL-MCNC: 9.5 MG/DL (ref 8.7–10.5)
CHLORIDE SERPL-SCNC: 100 MMOL/L (ref 95–110)
CO2 SERPL-SCNC: 23 MMOL/L (ref 23–29)
COMPLEXED PSA SERPL-MCNC: 3.4 NG/ML (ref 0–4)
CREAT SERPL-MCNC: 0.8 MG/DL (ref 0.5–1.4)
CRP SERPL-MCNC: 37.9 MG/L (ref 0–8.2)
DIFFERENTIAL METHOD BLD: ABNORMAL
EOSINOPHIL # BLD AUTO: 0.2 K/UL (ref 0–0.5)
EOSINOPHIL NFR BLD: 1.8 % (ref 0–8)
ERYTHROCYTE [DISTWIDTH] IN BLOOD BY AUTOMATED COUNT: 13.3 % (ref 11.5–14.5)
ERYTHROCYTE [SEDIMENTATION RATE] IN BLOOD BY PHOTOMETRIC METHOD: 46 MM/HR (ref 0–23)
EST. GFR  (NO RACE VARIABLE): >60 ML/MIN/1.73 M^2
GLUCOSE SERPL-MCNC: 87 MG/DL (ref 70–110)
HCT VFR BLD AUTO: 41.8 % (ref 40–54)
HGB BLD-MCNC: 14 G/DL (ref 14–18)
IMM GRANULOCYTES # BLD AUTO: 0.03 K/UL (ref 0–0.04)
IMM GRANULOCYTES NFR BLD AUTO: 0.4 % (ref 0–0.5)
LYMPHOCYTES # BLD AUTO: 1.4 K/UL (ref 1–4.8)
LYMPHOCYTES NFR BLD: 16.1 % (ref 18–48)
MCH RBC QN AUTO: 30.3 PG (ref 27–31)
MCHC RBC AUTO-ENTMCNC: 33.5 G/DL (ref 32–36)
MCV RBC AUTO: 91 FL (ref 82–98)
MONOCYTES # BLD AUTO: 1.4 K/UL (ref 0.3–1)
MONOCYTES NFR BLD: 16.1 % (ref 4–15)
NEUTROPHILS # BLD AUTO: 5.5 K/UL (ref 1.8–7.7)
NEUTROPHILS NFR BLD: 65 % (ref 38–73)
NRBC BLD-RTO: 0 /100 WBC
PLATELET # BLD AUTO: 194 K/UL (ref 150–450)
PMV BLD AUTO: 11.3 FL (ref 9.2–12.9)
POTASSIUM SERPL-SCNC: 4.7 MMOL/L (ref 3.5–5.1)
PROT SERPL-MCNC: 7.1 G/DL (ref 6–8.4)
RBC # BLD AUTO: 4.62 M/UL (ref 4.6–6.2)
SODIUM SERPL-SCNC: 136 MMOL/L (ref 136–145)
WBC # BLD AUTO: 8.47 K/UL (ref 3.9–12.7)

## 2024-04-01 PROCEDURE — 99214 OFFICE O/P EST MOD 30 MIN: CPT | Mod: S$GLB,,, | Performed by: INTERNAL MEDICINE

## 2024-04-01 PROCEDURE — 85652 RBC SED RATE AUTOMATED: CPT | Performed by: INTERNAL MEDICINE

## 2024-04-01 PROCEDURE — 1159F MED LIST DOCD IN RCRD: CPT | Mod: CPTII,S$GLB,, | Performed by: INTERNAL MEDICINE

## 2024-04-01 PROCEDURE — 80053 COMPREHEN METABOLIC PANEL: CPT | Performed by: INTERNAL MEDICINE

## 2024-04-01 PROCEDURE — 36415 COLL VENOUS BLD VENIPUNCTURE: CPT | Performed by: UROLOGY

## 2024-04-01 PROCEDURE — 85025 COMPLETE CBC W/AUTO DIFF WBC: CPT | Performed by: INTERNAL MEDICINE

## 2024-04-01 PROCEDURE — 3008F BODY MASS INDEX DOCD: CPT | Mod: CPTII,S$GLB,, | Performed by: INTERNAL MEDICINE

## 2024-04-01 PROCEDURE — 86140 C-REACTIVE PROTEIN: CPT | Performed by: INTERNAL MEDICINE

## 2024-04-01 PROCEDURE — 3078F DIAST BP <80 MM HG: CPT | Mod: CPTII,S$GLB,, | Performed by: INTERNAL MEDICINE

## 2024-04-01 PROCEDURE — 99999 PR PBB SHADOW E&M-EST. PATIENT-LVL III: CPT | Mod: PBBFAC,,, | Performed by: INTERNAL MEDICINE

## 2024-04-01 PROCEDURE — 84153 ASSAY OF PSA TOTAL: CPT | Performed by: UROLOGY

## 2024-04-01 PROCEDURE — 1160F RVW MEDS BY RX/DR IN RCRD: CPT | Mod: CPTII,S$GLB,, | Performed by: INTERNAL MEDICINE

## 2024-04-01 PROCEDURE — 3077F SYST BP >= 140 MM HG: CPT | Mod: CPTII,S$GLB,, | Performed by: INTERNAL MEDICINE

## 2024-04-01 NOTE — PROGRESS NOTES
History of present illness:  68-year-old gentleman with hypertension, diabetes, and BPH.  I have been following him for over 10 years for seropositive rheumatoid arthritis.  He has no evidence of erosive disease.  He has been on leflunomide since 2014.  His arthritis has been stable since that time.  He has had no recent joint swelling.   He has had no morning stiffness.  He is tolerating the medication.  He has had no fever or infections.  He was last seen 1 year ago.     He had another prostate biopsy over the summer.  1/14 biopsies did show adenocarcinoma.  He is on surveillance.  He missed his last PSA level.  He has had no bladder symptoms.    He had a rash that resolved.  Biopsy showed eczema.    In January he developed weakness in his left arm.  It was inability to lift his shoulder.  This lasted for about 3 weeks and then resolved.  He saw his primary doctor.  He was given a Dosepak but this did not help.  He was evaluated 1 time by physical therapy but was not felt to be a candidate for physical therapy.  Since his weakness has resolved, he has had some paresthesias in the posterior shoulder that do not radiate down the arm.  He has had no problem with his neck.  He has had no similar pain on the right side.  He had no antecedent URI or vaccine.  Had no rash in the area.    Physical examination:  Musculoskeletal:  Full range of motion of all joints.  No soft tissue swelling, erythema, or increased warmth.  No tenderness to palpation.    Neurologic:  Has normal muscle strength testing of most shoulders.    Assessment:  1. No evidence of active rheumatoid arthritis   2. 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  Laboratory studies obtained  2. Since he has been in remission for a while, I think it worthwhile trying to taper him off his leflunomide.  I told him to decrease the leflunomide to every other day for 1 month, every 3rd day for 1 month, twice weekly for 1 month, and  then 1 weekly for 1 month.  If he does not flare in the interim, he can stop the leflunomide.  If he does flare, he is to go back to daily.  3.  If he is still on the leflunomide, he is to have laboratory studies in 6 months   4. Otherwise he is to see me in 1 year whether he is on the leflunomide or not.

## 2024-04-18 DIAGNOSIS — C61 PROSTATE CANCER: Primary | ICD-10-CM

## 2024-04-23 DIAGNOSIS — M05.79 SEROPOSITIVE RHEUMATOID ARTHRITIS OF MULTIPLE SITES: ICD-10-CM

## 2024-04-24 RX ORDER — LEFLUNOMIDE 20 MG/1
20 TABLET ORAL
Qty: 90 TABLET | Refills: 2 | Status: SHIPPED | OUTPATIENT
Start: 2024-04-24

## 2024-06-05 ENCOUNTER — HOSPITAL ENCOUNTER (OUTPATIENT)
Dept: RADIOLOGY | Facility: CLINIC | Age: 69
Discharge: HOME | End: 2024-06-05
Attending: STUDENT IN AN ORGANIZED HEALTH CARE EDUCATION/TRAINING PROGRAM
Payer: MEDICARE

## 2024-06-05 ENCOUNTER — OFFICE VISIT (OUTPATIENT)
Dept: FAMILY MEDICINE | Facility: CLINIC | Age: 69
End: 2024-06-05
Payer: MEDICARE

## 2024-06-05 VITALS
BODY MASS INDEX: 34.25 KG/M2 | SYSTOLIC BLOOD PRESSURE: 118 MMHG | HEART RATE: 60 BPM | OXYGEN SATURATION: 95 % | WEIGHT: 231.25 LBS | RESPIRATION RATE: 16 BRPM | HEIGHT: 69 IN | DIASTOLIC BLOOD PRESSURE: 70 MMHG

## 2024-06-05 DIAGNOSIS — C61 PROSTATE CANCER: ICD-10-CM

## 2024-06-05 DIAGNOSIS — E78.5 HYPERLIPIDEMIA, UNSPECIFIED HYPERLIPIDEMIA TYPE: ICD-10-CM

## 2024-06-05 DIAGNOSIS — M79.672 PAIN OF LEFT HEEL: ICD-10-CM

## 2024-06-05 DIAGNOSIS — I48.0 PAROXYSMAL ATRIAL FIBRILLATION: ICD-10-CM

## 2024-06-05 DIAGNOSIS — Z00.00 ROUTINE GENERAL MEDICAL EXAMINATION AT A HEALTH CARE FACILITY: Primary | ICD-10-CM

## 2024-06-05 DIAGNOSIS — Z79.899 DRUG-INDUCED IMMUNODEFICIENCY: ICD-10-CM

## 2024-06-05 DIAGNOSIS — D84.821 DRUG-INDUCED IMMUNODEFICIENCY: ICD-10-CM

## 2024-06-05 DIAGNOSIS — R73.03 PRE-DIABETES: ICD-10-CM

## 2024-06-05 PROCEDURE — 1160F RVW MEDS BY RX/DR IN RCRD: CPT | Mod: CPTII,S$GLB,, | Performed by: STUDENT IN AN ORGANIZED HEALTH CARE EDUCATION/TRAINING PROGRAM

## 2024-06-05 PROCEDURE — 1101F PT FALLS ASSESS-DOCD LE1/YR: CPT | Mod: CPTII,S$GLB,, | Performed by: STUDENT IN AN ORGANIZED HEALTH CARE EDUCATION/TRAINING PROGRAM

## 2024-06-05 PROCEDURE — 73630 X-RAY EXAM OF FOOT: CPT | Mod: 26,LT,S$GLB, | Performed by: RADIOLOGY

## 2024-06-05 PROCEDURE — G2211 COMPLEX E/M VISIT ADD ON: HCPCS | Mod: S$GLB,,, | Performed by: STUDENT IN AN ORGANIZED HEALTH CARE EDUCATION/TRAINING PROGRAM

## 2024-06-05 PROCEDURE — 3008F BODY MASS INDEX DOCD: CPT | Mod: CPTII,S$GLB,, | Performed by: STUDENT IN AN ORGANIZED HEALTH CARE EDUCATION/TRAINING PROGRAM

## 2024-06-05 PROCEDURE — 99214 OFFICE O/P EST MOD 30 MIN: CPT | Mod: S$GLB,,, | Performed by: STUDENT IN AN ORGANIZED HEALTH CARE EDUCATION/TRAINING PROGRAM

## 2024-06-05 PROCEDURE — 73630 X-RAY EXAM OF FOOT: CPT | Mod: TC,FY,PO,LT

## 2024-06-05 PROCEDURE — 3288F FALL RISK ASSESSMENT DOCD: CPT | Mod: CPTII,S$GLB,, | Performed by: STUDENT IN AN ORGANIZED HEALTH CARE EDUCATION/TRAINING PROGRAM

## 2024-06-05 PROCEDURE — 3078F DIAST BP <80 MM HG: CPT | Mod: CPTII,S$GLB,, | Performed by: STUDENT IN AN ORGANIZED HEALTH CARE EDUCATION/TRAINING PROGRAM

## 2024-06-05 PROCEDURE — 1159F MED LIST DOCD IN RCRD: CPT | Mod: CPTII,S$GLB,, | Performed by: STUDENT IN AN ORGANIZED HEALTH CARE EDUCATION/TRAINING PROGRAM

## 2024-06-05 PROCEDURE — 1125F AMNT PAIN NOTED PAIN PRSNT: CPT | Mod: CPTII,S$GLB,, | Performed by: STUDENT IN AN ORGANIZED HEALTH CARE EDUCATION/TRAINING PROGRAM

## 2024-06-05 PROCEDURE — 4010F ACE/ARB THERAPY RXD/TAKEN: CPT | Mod: CPTII,S$GLB,, | Performed by: STUDENT IN AN ORGANIZED HEALTH CARE EDUCATION/TRAINING PROGRAM

## 2024-06-05 PROCEDURE — 3074F SYST BP LT 130 MM HG: CPT | Mod: CPTII,S$GLB,, | Performed by: STUDENT IN AN ORGANIZED HEALTH CARE EDUCATION/TRAINING PROGRAM

## 2024-06-05 PROCEDURE — 99999 PR PBB SHADOW E&M-EST. PATIENT-LVL V: CPT | Mod: PBBFAC,,, | Performed by: STUDENT IN AN ORGANIZED HEALTH CARE EDUCATION/TRAINING PROGRAM

## 2024-06-05 RX ORDER — MELOXICAM 15 MG/1
15 TABLET ORAL DAILY
Qty: 30 TABLET | Refills: 0 | Status: SHIPPED | OUTPATIENT
Start: 2024-06-05 | End: 2024-07-05

## 2024-06-05 NOTE — PROGRESS NOTES
"[facility] CLINIC NOTE    Patient Name: Jaswinder Cifuentes  YOB: 1955    PRESENTING HISTORY     History of Present Illness:  Mr. Jaswinder Cifuentes is a 68 y.o. male here for routine checkup.     Few weeks of left heel pain.   No terauma.   Sudden onset.   Feels like walking on a stone.       Upcoming C-scope with [place].     Sees Dr. Parra for RA- on leflunomide    Sees Dr. Olson. Hx Afib with low burden, decided to stop his AC. Aortic stenosis.    No easy dyspnea. No orthopnea.     Sees Dr. Real- Divya 6 prostate cancer. Active surveillance, appropriate f/u in place.     Pre-diabetes controlled. He wants to continue metformin.     HLD- on statin.     ROS      OBJECTIVE:   Vital Signs:  Vitals:    06/05/24 0917   BP: 118/70   Pulse: 60   Resp: 16   SpO2: 95%   Weight: 104.9 kg (231 lb 4.2 oz)   Height: 5' 9" (1.753 m)         Physical Exam  Vitals and nursing note reviewed.   Constitutional:       Appearance: He is not diaphoretic.   HENT:      Head: Normocephalic and atraumatic.      Right Ear: External ear normal.      Left Ear: External ear normal.   Eyes:      General: No scleral icterus.     Conjunctiva/sclera: Conjunctivae normal.      Pupils: Pupils are equal, round, and reactive to light.   Neck:      Thyroid: No thyromegaly.   Cardiovascular:      Rate and Rhythm: Normal rate and regular rhythm.      Heart sounds: Murmur (1/6) heard.   Pulmonary:      Effort: Pulmonary effort is normal. No respiratory distress.      Breath sounds: Normal breath sounds. No wheezing or rales.   Musculoskeletal:         General: No tenderness or deformity. Normal range of motion.      Cervical back: Normal range of motion and neck supple.      Comments: Left foot: No ttp over plantar facia, achilles. Mild ttp base of heel. Pes planus.    Lymphadenopathy:      Cervical: No cervical adenopathy.   Skin:     General: Skin is warm and dry.      Findings: No erythema or rash.   Neurological: "      Mental Status: He is alert and oriented to person, place, and time.      Gait: Gait is intact.   Psychiatric:         Mood and Affect: Mood and affect normal.         Cognition and Memory: Memory normal.         Judgment: Judgment normal.         ASSESSMENT & PLAN:     Routine general medical examination at a health care facility  -     LIPID PANEL; Future; Expected date: 06/05/2024  -     HEMOGLOBIN A1C; Future; Expected date: 06/05/2024    Pain of left heel  -     X-Ray Foot Complete Left; Future; Expected date: 06/05/2024  -     meloxicam (MOBIC) 15 MG tablet; Take 1 tablet (15 mg total) by mouth once daily.  Dispense: 30 tablet; Refill: 0    Pre-diabetes  -     HEMOGLOBIN A1C; Future; Expected date: 06/05/2024  -     LIPID PANEL; Future; Expected date: 06/05/2024  -     HEMOGLOBIN A1C; Future; Expected date: 06/05/2024  Stable, continue current medications    Hyperlipidemia, unspecified hyperlipidemia type  -     LIPID PANEL; Future; Expected date: 06/05/2024  -     HEMOGLOBIN A1C; Future; Expected date: 06/05/2024  Stable, continue current medications    Prostate cancer  See above    Paroxysmal atrial fibrillation  See above    Drug-induced immunodeficiency  Minimal risk with current RA drug           Chaitanya Fernández  Internal Medicine    Visit complexity inherent to evaluation and management associated with medical care services that serve as the continuing focal point for all needed health care services and/or with medical care services that are part of ongoing care related to a patient's single, serious condition or a complex condition provided today

## 2024-06-07 ENCOUNTER — PATIENT OUTREACH (OUTPATIENT)
Dept: ADMINISTRATIVE | Facility: HOSPITAL | Age: 69
End: 2024-06-07
Payer: MEDICARE

## 2024-06-07 NOTE — PROGRESS NOTES
Population Health Chart Review & Patient Outreach Details      Additional [facility] Health Notes:      Last cscope in [facility] was 7/17/2018.  Appears to have one 7/22/24.  Will request         Updates Requested / Reviewed:      Updated Care Coordination Note, Care Everywhere, , and External Sources: [facility]         Health Maintenance Topics Overdue:      [facility] Score: 1     Colon Cancer Screening    RSV Vaccine                  Health Maintenance Topic(s) Outreach Outcomes & Actions Taken:    Colorectal Cancer Screening - Outreach Outcomes & Actions Taken  : Colonoscopy Case Request / Referral / Home Test Order Placed

## 2024-07-22 LAB — CRC RECOMMENDATION EXT: NORMAL

## 2024-07-26 ENCOUNTER — PATIENT OUTREACH (OUTPATIENT)
Dept: ADMINISTRATIVE | Facility: HOSPITAL | Age: 69
End: 2024-07-26
Payer: MEDICARE

## 2024-08-14 RX ORDER — TAMSULOSIN HYDROCHLORIDE 0.4 MG/1
0.4 CAPSULE ORAL DAILY
Qty: 30 CAPSULE | Refills: 11 | Status: SHIPPED | OUTPATIENT
Start: 2024-08-14

## 2024-09-16 DIAGNOSIS — E78.2 MIXED HYPERLIPIDEMIA: ICD-10-CM

## 2024-09-16 DIAGNOSIS — K21.9 GASTROESOPHAGEAL REFLUX DISEASE, UNSPECIFIED WHETHER ESOPHAGITIS PRESENT: Primary | ICD-10-CM

## 2024-09-16 RX ORDER — OMEPRAZOLE 20 MG/1
20 CAPSULE, DELAYED RELEASE ORAL DAILY
Qty: 90 CAPSULE | Refills: 1 | Status: SHIPPED | OUTPATIENT
Start: 2024-09-16

## 2024-09-16 RX ORDER — ROSUVASTATIN CALCIUM 10 MG/1
10 TABLET, COATED ORAL DAILY
Qty: 90 TABLET | Refills: 3 | Status: SHIPPED | OUTPATIENT
Start: 2024-09-16

## 2024-09-16 NOTE — TELEPHONE ENCOUNTER
No care due was identified.  [facility] Embedded Care Due Messages. Reference number: [id number].   9/16/2024 2:18:09 PM CDT

## 2024-09-16 NOTE — TELEPHONE ENCOUNTER
Care Due:                  Date            Visit Type   Department     Provider  --------------------------------------------------------------------------------                                EP -                              PRIMARY      [facility]    Chaitanya Sheikh  Last Visit: 06-      CARE ([facility])   MEDICINE       Naccari                              EP -                              PRIMARY      [facility]    Chaitanya Yipon  Next Visit: 06-      CARE ([facility])   MEDICINE       Naccari                                                            Last  Test          Frequency    Reason                     Performed    Due Date  --------------------------------------------------------------------------------    HBA1C.......  6 months...  metFORMIN................  06- 12-    Health [facility] Embedded Care Due Messages. Reference number: [id number].   9/16/2024 10:17:39 AM CDT

## 2024-10-21 ENCOUNTER — LAB VISIT (OUTPATIENT)
Dept: LAB | Facility: HOSPITAL | Age: 69
End: 2024-10-21
Attending: UROLOGY
Payer: MEDICARE

## 2024-10-21 DIAGNOSIS — C61 PROSTATE CANCER: ICD-10-CM

## 2024-10-21 LAB — COMPLEXED PSA SERPL-MCNC: 3.1 NG/ML (ref 0–4)

## 2024-10-21 PROCEDURE — 84153 ASSAY OF PSA TOTAL: CPT | Performed by: UROLOGY

## 2024-10-21 PROCEDURE — 36415 COLL VENOUS BLD VENIPUNCTURE: CPT | Performed by: UROLOGY

## 2024-10-28 ENCOUNTER — TELEPHONE (OUTPATIENT)
Dept: UROLOGY | Facility: CLINIC | Age: 69
End: 2024-10-28
Payer: MEDICARE

## 2024-11-18 ENCOUNTER — OFFICE VISIT (OUTPATIENT)
Dept: UROLOGY | Facility: CLINIC | Age: 69
End: 2024-11-18
Payer: MEDICARE

## 2024-11-18 VITALS — WEIGHT: 225 LBS | HEIGHT: 69 IN | BODY MASS INDEX: 33.33 KG/M2 | RESPIRATION RATE: 18 BRPM

## 2024-11-18 DIAGNOSIS — N40.1 BPH WITH OBSTRUCTION/LOWER URINARY TRACT SYMPTOMS: ICD-10-CM

## 2024-11-18 DIAGNOSIS — N13.8 BPH WITH OBSTRUCTION/LOWER URINARY TRACT SYMPTOMS: ICD-10-CM

## 2024-11-18 DIAGNOSIS — C61 PROSTATE CANCER: Primary | ICD-10-CM

## 2024-11-18 PROCEDURE — 3288F FALL RISK ASSESSMENT DOCD: CPT | Mod: CPTII,S$GLB,, | Performed by: UROLOGY

## 2024-11-18 PROCEDURE — 1101F PT FALLS ASSESS-DOCD LE1/YR: CPT | Mod: CPTII,S$GLB,, | Performed by: UROLOGY

## 2024-11-18 PROCEDURE — 1126F AMNT PAIN NOTED NONE PRSNT: CPT | Mod: CPTII,S$GLB,, | Performed by: UROLOGY

## 2024-11-18 PROCEDURE — 3044F HG A1C LEVEL LT 7.0%: CPT | Mod: CPTII,S$GLB,, | Performed by: UROLOGY

## 2024-11-18 PROCEDURE — 1159F MED LIST DOCD IN RCRD: CPT | Mod: CPTII,S$GLB,, | Performed by: UROLOGY

## 2024-11-18 PROCEDURE — G2211 COMPLEX E/M VISIT ADD ON: HCPCS | Mod: S$GLB,,, | Performed by: UROLOGY

## 2024-11-18 PROCEDURE — 99214 OFFICE O/P EST MOD 30 MIN: CPT | Mod: S$GLB,,, | Performed by: UROLOGY

## 2024-11-18 PROCEDURE — 4010F ACE/ARB THERAPY RXD/TAKEN: CPT | Mod: CPTII,S$GLB,, | Performed by: UROLOGY

## 2024-11-18 PROCEDURE — 3008F BODY MASS INDEX DOCD: CPT | Mod: CPTII,S$GLB,, | Performed by: UROLOGY

## 2024-11-18 PROCEDURE — 99999 PR PBB SHADOW E&M-EST. PATIENT-LVL III: CPT | Mod: PBBFAC,,, | Performed by: UROLOGY

## 2024-11-18 RX ORDER — TAMSULOSIN HYDROCHLORIDE 0.4 MG/1
0.4 CAPSULE ORAL DAILY
Qty: 30 CAPSULE | Refills: 11 | Status: SHIPPED | OUTPATIENT
Start: 2024-11-18

## 2024-11-18 RX ORDER — BENAZEPRIL HYDROCHLORIDE 20 MG/1
20 TABLET ORAL DAILY
COMMUNITY
Start: 2024-10-06

## 2024-11-18 NOTE — PROGRESS NOTES
[facility] Urology Progress Note     Jaswinder Cifuentes is a 69 y.o. male who presents for fu prostate ca (on AS) and BPH/LUTS component     PMHx of HTN, HLD, AP, DM, AS, RA (Dr Parra, off flutamide)     Started flomax in  with reduction in AUSS from 31/4 to 7/2 with less urgency/frequency and less nocturia in 2019 pvr 0, then fu with NP in  AUA SS 5/3, pvr 46.   Continued flomax     21: has been told by cardiology Dr Olson he may need AV replacement. Otherwise urinating well, stable on flomax. AUA SS: 5/2 (3: sleeping, 2 frequency  Dr olson 21; Some palpitations since last visit, Severe AS: Will check with TAVR clinic to see when he is to be evaluated again, at this point in time he remains asymptomatic  HTN: continue benazepril and metoprolol. FU 3 mos     22 NP f/u AUA SS:  17/3 (ICBE:2, Frequency:3, Intermittency:2, Urgency:3, Weak stream:2, Strainin, Nocturia:4). NTFx4 only while drinking during holidays, since stopping alcohol NTFx1   PSA up from 2.8 in 21 to 4.5 on 22, and recheck 3.8 (15.26% free) on 22.. Previously PSA 3.3 on 19, 1.9 on 18, 3.1 on 16, 2.2 in , 1.2 in   No family history of prostate cancer. Beer in evening increases nocturnal frequency  Still on flomax 0.4mg and feels like he is urinating well. AUA SS: 8/2 (2:  Urgency, sleeping; 1:  Emptying, frequency, intermittency, weak stream) meds help 9/10  AP - no cpap, not controlled. Echo and holter last week and holter had more than expected arrythmias, so starting 30d holter soon. CY 35g benign, some hemorrhoids     Cysto/prostate biopsy 22: volume 46.9cc, U/S: mild PVR, no med lobe, L sv more full than right, scattered hypoechoities, more on left near apex and TZ  CYSTO FINDINGS:  Significant prostate obstruction with anterolateral lobe ingrowth centrally to meet high bladder neck and elevated floor of prostate at BN with eearly extension of bilat lat lobes seen on  retroflexion without median lobe, moderate diffuse thick trabevulations, and small cellules on posteiror wall  PATH: Focus of ASAP at [facility]  Restarted bloodthinners without issue. Feels like LUTS are well controlled on flomax     He returned July 20223 noting  PSA 2.7 (14.44% free) on 12/9/22  PSA 2.4 (14.17% free) on 6/27/23 6/2/23 HbA1c is 5.5, Cr is 1.0, eGFR >60  Followed up with Dr Gallegos recently, noting Hx of TAVR and has Pacemaker for pAF which interrogration in 1/23 noted <1% AF burden as did it in 4/23 so he stopped eliqus  TAVR and pacemaker was 1 yr ago today  On flomax 0.4mg daily AUA SS: 7/1 (3 frequency, 2 sleeping, 1: emptying, weak stream). Frequency more of a bother with beer intake otherwise not too bad     Restaging biopsy 8/8/23: CY 35-40g nonnodular ; TRUS VOLUME: 49.75cm3  SPECIMEN: 14 core prostate biopsy - right and left base, middle, apex - medial and lateral of each, with 2 total cores at [facility] lat  8/8/23 PATHOLOGY: LEFT MID MEDIAL, TRACY SCORE 3+3=6 (GRADE GROUP 1) - LESS THAN 1 MM (LESS THAN 5% OF SUBMITTED TISSUE), 1 OF 2 CORES   1/14 cores+ low volume low grade disease at site of Prev asap [facility]  Did well after biopsy.  No fevers chills concerns or significant bleeding.  - elected AS for his very low risk CaP and continued flomax for bph component    He returns today noting  PSA trend including interim PSA   Reference Range 02/01/22 07:19 04/01/22 08:15 12/09/22 09:26 06/27/23 08:38 04/01/24 11:05 10/21/24 09:08   Prostate Specific Antigen 0.00 - 4.00  4.5 (H)        PSA Diagnostic 0.00 - 4.00      3.4 3.1   PSA Total 0.00 - 4.00   3.8 2.7 2.4     Has remained on flomax  AUA SS: 9/2 (3 weak stream, 2 sleeping, 1:  Emptying, frequency, intermittency, straining)  No blood in urine, only on asa81, nocturia increases with beer  Cardiolog - dr gallegos - sees him 11/25/24    Focused Physical Exam:    Vitals:    11/18/24 1251   Resp: 18     Body mass index is 33.23 kg/m². Weight:  "102.1 kg (225 lb) Height: 5' 9" (175.3 cm)     Abdomen: Soft, non-tender, nondistended, no CVA tenderness    ASSESSMENT   1. Prostate cancer  Prostate Specific Antigen, Diagnostic    Prostate Specific Antigen, Diagnostic      2. BPH with obstruction/lower urinary tract symptoms            Plan    Again reviewed his diagnosis of low-grade Melcher [place] 6 prostate cancer, only 1 core positive at site of previous asap positivity 1 year prior.  No significant progression.  With his very low risk disease with Melcher [place] 6 PSA less than 10 clinical T1c PSA density less than 0.15, less than 1/3 cores positive, less than 50% involvement of cores., Active surveillance most appropriate, and he would like to continue in this manner.    Again reviewed that no confirmatory active surveillance biopsy was necessary as we essentially did this and his slight positivity was only positive at site of previous atypia.  Will continue to follow his PSA every 6 months, and again noted we can alternating evaluations with MRI and biopsy spacing these evaluations out 2-3 years of PSA is stable or intervening sooner in the case of PSA rise.  We still have not determined if he can have an MRI if his pacemaker/TAVR are safe to do so.  However if he does have a device that it was capable of MRI if rep was present for deactivation, would need to schedule at [facility] or [facility]. Will again reach out to cardiology, Dr Olson who he sees next week.   Will plan Q six-month PSA lab visits with 1 year annual visit.     As well, noted his BPH component, managed well on Flomax with only moderate obstructive lower urinary tract symptoms.  Minimal bother.  Again reviewed his cystoscopic findings of obstruction at time of initial workup noting minimally invasive BPH procedures have a role in patients on active surveillance for prostate cancer.  Currently he feels well and would like to continue Flomax.  Prescription was renewed.  And however noting the " chronic obstructive changes at the level of the bladder with trabeculations and cellules at time of initial workup, discuss the natural history of BPH with obstruction and further bladder damage, and the benefits of intervention which would obviate the need for medication and protect the bladder going forward.  At time of his next PSA in 6 months will also schedule him to come to the office for a uroflow as an objective office based assessment of obstructive voiding while on alpha blockers.  If unobstructed flow and good emptying, will continue as planned.  If any concerns, can bring in sooner to discuss intervention    Level of Medical Decision Making  [ _ ]  Low: 2 minor/1 stable chronic/1 acute uncomplicated --- review record/result/order test x2  [ X ]  Mod: 1 chronic exac/2stable chronic/1 acute comp --- review record/result/order test x3 OR independent interp of outside test OR discuss mgmt of outside ordered test --- start drug therapy/plan minor surgery   [ _ ]  High: chronic with exacerbation/progression or trtmnt side effect vs acute/chronic w/ life/body threat ---  (2/3) review record/result/order test x3 OR independent interp of ouutside test OR discuss mgmt of outside ordered test --- drug with monitoring for toxicity, plan major surgery, hospitalize, deescalate/withdraw care bc of prognosis       ** addendum, finally when asked, patient presented while it cards for both his pacemaker and his aortic valve noting MR conditional on both, with a valve specifying safety in 1.5 and 3.0 Lynnette magnet

## 2024-11-19 ENCOUNTER — TELEPHONE (OUTPATIENT)
Dept: UROLOGY | Facility: CLINIC | Age: 69
End: 2024-11-19
Payer: MEDICARE

## 2024-11-19 NOTE — TELEPHONE ENCOUNTER
"Need to send note with clearance request from Dr. Olson to clear for MRI based on his pacemaker/TAVR.  If his pacemaker is compatible with MRI, would then schedule in the future at facility were device rep can be present for deactivation if he was cardiac cleared to have MRI.         Per Dr Olson in writing  "Pacemaker is MRI compatible-ok for MRI  Also ok for mri with hx TAVR-2022  Doc Scanned in chart  "

## 2024-11-25 ENCOUNTER — PATIENT MESSAGE (OUTPATIENT)
Dept: RHEUMATOLOGY | Facility: CLINIC | Age: 69
End: 2024-11-25
Payer: MEDICARE

## 2024-12-04 LAB
LEFT EYE DM RETINOPATHY: NEGATIVE
RIGHT EYE DM RETINOPATHY: NEGATIVE

## 2024-12-11 ENCOUNTER — PATIENT OUTREACH (OUTPATIENT)
Dept: ADMINISTRATIVE | Facility: HOSPITAL | Age: 69
End: 2024-12-11
Payer: MEDICARE

## 2025-01-30 DIAGNOSIS — M05.79 SEROPOSITIVE RHEUMATOID ARTHRITIS OF MULTIPLE SITES: ICD-10-CM

## 2025-01-30 RX ORDER — LEFLUNOMIDE 20 MG/1
20 TABLET ORAL DAILY
Qty: 90 TABLET | Refills: 0 | Status: SHIPPED | OUTPATIENT
Start: 2025-01-30

## 2025-02-03 ENCOUNTER — TELEPHONE (OUTPATIENT)
Dept: RHEUMATOLOGY | Facility: CLINIC | Age: 70
End: 2025-02-03
Payer: MEDICARE

## 2025-02-03 NOTE — TELEPHONE ENCOUNTER
Called to offer pt appointment . Appt scheduled for Wed, April 9th at 10am.  ----- Message from [employer] sent at 2/3/2025  9:41 AM CST -----  Regarding: Patient Returning Call  Contact: pt  Type:  Patient Returning Call    Who Called:Pt  Who Left Message for Patient: not sure of name  Does the patient know what this is regarding?:01/30  Would the patient rather a call back or a response via [facility]? yes, need to schedule an appt, per Dr. Aida Gayle Call Back Number:[phone] (home)    Additional Information:

## 2025-03-17 DIAGNOSIS — K21.9 GASTROESOPHAGEAL REFLUX DISEASE, UNSPECIFIED WHETHER ESOPHAGITIS PRESENT: ICD-10-CM

## 2025-03-17 RX ORDER — OMEPRAZOLE 20 MG/1
CAPSULE, DELAYED RELEASE ORAL
Qty: 90 CAPSULE | Refills: 0 | Status: SHIPPED | OUTPATIENT
Start: 2025-03-17

## 2025-03-17 NOTE — TELEPHONE ENCOUNTER
Care Due:                  Date            Visit Type   Department     Provider  --------------------------------------------------------------------------------                                EP -                              PRIMARY      MARY Sheikh  Last Visit: 06-      CARE ([facility])   MEDICINE       Naccari                              EP -                              PRIMARY      [facility]    Chaitanya Sheikh  Next Visit: 06-      CARE ([facility])   MEDICINE       Naccari                                                            Last  Test          Frequency    Reason                     Performed    Due Date  --------------------------------------------------------------------------------    CMP.........  12 months..  metFORMIN, rosuvastatin..  04- 03-    HBA1C.......  6 months...  metFORMIN................  06- 12-    Lipid Panel.  12 months..  rosuvastatin.............  06- 06-    Health [facility] Embedded Care Due Messages. Reference number: [id number].   3/17/2025 11:52:18 AM CDT

## 2025-03-17 NOTE — TELEPHONE ENCOUNTER
Provider Staff:  Action required for this patient    Requires labs      Please see care gap opportunities below in Care Due Message.    Thanks!  [facility]     Appointments      Date Provider   Last Visit   6/5/2024 Chaitanya Fernández MD   Next Visit   6/5/2025 Chaitanya Fernández MD     Refill Decision Note   Jaswinder Esau  is requesting a refill authorization.  Brief Assessment and Rationale for Refill:  Approve     Medication Therapy Plan:        Comments:     Note composed:12:09 PM 03/17/2025

## 2025-03-26 DIAGNOSIS — K21.9 GASTROESOPHAGEAL REFLUX DISEASE, UNSPECIFIED WHETHER ESOPHAGITIS PRESENT: ICD-10-CM

## 2025-03-26 RX ORDER — OMEPRAZOLE 20 MG/1
CAPSULE, DELAYED RELEASE ORAL
Qty: 90 CAPSULE | Refills: 1 | Status: SHIPPED | OUTPATIENT
Start: 2025-03-26

## 2025-03-26 NOTE — TELEPHONE ENCOUNTER
No care due was identified.  Health [facility] Embedded Care Due Messages. Reference number: [id number].   3/26/2025 8:08:04 AM CDT

## 2025-03-26 NOTE — TELEPHONE ENCOUNTER
Refill Decision Note   Jaswinder Esau  is requesting a refill authorization.  Brief Assessment and Rationale for Refill:  Approve     Medication Therapy Plan:         Comments:     Note composed:2:26 PM 03/26/2025             Appointments     Last Visit   6/5/2024 Chaitanya Fernández MD   Next Visit   6/5/2025 Chaitanya Fernández MD

## 2025-03-27 ENCOUNTER — TELEPHONE (OUTPATIENT)
Dept: UROLOGY | Facility: CLINIC | Age: 70
End: 2025-03-27
Payer: MEDICARE

## 2025-03-27 NOTE — TELEPHONE ENCOUNTER
----- Message from Brooke sent at 3/27/2025 10:10 AM CDT -----  Type: Needs Medical AdviceWho Called:  ptBest Call Back Number: [phone] Information: pt is calling the office to get his cancelled nurse appt, that was scheduled on 05/26,  rescheduled. Please call back to advise. Thanks!

## 2025-03-27 NOTE — TELEPHONE ENCOUNTER
Pt called to reschedule and appt with MD as Dr kilpatrick no longer with practice  Pt chose Dr Pelaez  Appt scheduled

## 2025-04-09 ENCOUNTER — OFFICE VISIT (OUTPATIENT)
Dept: RHEUMATOLOGY | Facility: CLINIC | Age: 70
End: 2025-04-09
Payer: MEDICARE

## 2025-04-09 ENCOUNTER — LAB VISIT (OUTPATIENT)
Dept: LAB | Facility: HOSPITAL | Age: 70
End: 2025-04-09
Attending: INTERNAL MEDICINE
Payer: MEDICARE

## 2025-04-09 VITALS
BODY MASS INDEX: 33.99 KG/M2 | SYSTOLIC BLOOD PRESSURE: 143 MMHG | WEIGHT: 230.19 LBS | HEART RATE: 60 BPM | DIASTOLIC BLOOD PRESSURE: 87 MMHG

## 2025-04-09 DIAGNOSIS — Z79.899 LONG-TERM USE OF HIGH-RISK MEDICATION: ICD-10-CM

## 2025-04-09 DIAGNOSIS — M10.9 PODAGRA: ICD-10-CM

## 2025-04-09 DIAGNOSIS — C61 PROSTATE CANCER: ICD-10-CM

## 2025-04-09 DIAGNOSIS — M05.79 SEROPOSITIVE RHEUMATOID ARTHRITIS OF MULTIPLE SITES: ICD-10-CM

## 2025-04-09 DIAGNOSIS — M05.79 SEROPOSITIVE RHEUMATOID ARTHRITIS OF MULTIPLE SITES: Primary | ICD-10-CM

## 2025-04-09 LAB
ABSOLUTE EOSINOPHIL (OHS): 0.14 K/UL
ABSOLUTE MONOCYTE (OHS): 0.95 K/UL (ref 0.3–1)
ABSOLUTE NEUTROPHIL COUNT (OHS): 3.94 K/UL (ref 1.8–7.7)
ALBUMIN SERPL BCP-MCNC: 3.6 G/DL (ref 3.5–5.2)
ALP SERPL-CCNC: 103 UNIT/L (ref 40–150)
ALT SERPL W/O P-5'-P-CCNC: 22 UNIT/L (ref 10–44)
ANION GAP (OHS): 9 MMOL/L (ref 8–16)
AST SERPL-CCNC: 25 UNIT/L (ref 11–45)
BASOPHILS # BLD AUTO: 0.04 K/UL
BASOPHILS NFR BLD AUTO: 0.6 %
BILIRUB SERPL-MCNC: 0.4 MG/DL (ref 0.1–1)
BUN SERPL-MCNC: 16 MG/DL (ref 8–23)
CALCIUM SERPL-MCNC: 9.1 MG/DL (ref 8.7–10.5)
CHLORIDE SERPL-SCNC: 105 MMOL/L (ref 95–110)
CO2 SERPL-SCNC: 25 MMOL/L (ref 23–29)
CREAT SERPL-MCNC: 0.8 MG/DL (ref 0.5–1.4)
CRP SERPL-MCNC: 8.6 MG/L
ERYTHROCYTE [DISTWIDTH] IN BLOOD BY AUTOMATED COUNT: 13.2 % (ref 11.5–14.5)
ERYTHROCYTE [SEDIMENTATION RATE] IN BLOOD BY PHOTOMETRIC METHOD: 26 MM/HR
GFR SERPLBLD CREATININE-BSD FMLA CKD-EPI: >60 ML/MIN/1.73/M2
GLUCOSE SERPL-MCNC: 104 MG/DL (ref 70–110)
HCT VFR BLD AUTO: 42.5 % (ref 40–54)
HGB BLD-MCNC: 13.7 GM/DL (ref 14–18)
IMM GRANULOCYTES # BLD AUTO: 0.03 K/UL (ref 0–0.04)
IMM GRANULOCYTES NFR BLD AUTO: 0.5 % (ref 0–0.5)
LYMPHOCYTES # BLD AUTO: 1.4 K/UL (ref 1–4.8)
MCH RBC QN AUTO: 28.7 PG (ref 27–31)
MCHC RBC AUTO-ENTMCNC: 32.2 G/DL (ref 32–36)
MCV RBC AUTO: 89 FL (ref 82–98)
NUCLEATED RBC (/100WBC) (OHS): 0 /100 WBC
PLATELET # BLD AUTO: 186 K/UL (ref 150–450)
PMV BLD AUTO: 10.9 FL (ref 9.2–12.9)
POTASSIUM SERPL-SCNC: 4.4 MMOL/L (ref 3.5–5.1)
PROT SERPL-MCNC: 7.5 GM/DL (ref 6–8.4)
RBC # BLD AUTO: 4.78 M/UL (ref 4.6–6.2)
RELATIVE EOSINOPHIL (OHS): 2.2 %
RELATIVE LYMPHOCYTE (OHS): 21.5 % (ref 18–48)
RELATIVE MONOCYTE (OHS): 14.6 % (ref 4–15)
RELATIVE NEUTROPHIL (OHS): 60.6 % (ref 38–73)
SODIUM SERPL-SCNC: 139 MMOL/L (ref 136–145)
URATE SERPL-MCNC: 8 MG/DL (ref 3.4–7)
WBC # BLD AUTO: 6.5 K/UL (ref 3.9–12.7)

## 2025-04-09 PROCEDURE — 85025 COMPLETE CBC W/AUTO DIFF WBC: CPT

## 2025-04-09 PROCEDURE — 1160F RVW MEDS BY RX/DR IN RCRD: CPT | Mod: CPTII,S$GLB,, | Performed by: INTERNAL MEDICINE

## 2025-04-09 PROCEDURE — 3008F BODY MASS INDEX DOCD: CPT | Mod: CPTII,S$GLB,, | Performed by: INTERNAL MEDICINE

## 2025-04-09 PROCEDURE — G2211 COMPLEX E/M VISIT ADD ON: HCPCS | Mod: S$GLB,,, | Performed by: INTERNAL MEDICINE

## 2025-04-09 PROCEDURE — 3077F SYST BP >= 140 MM HG: CPT | Mod: CPTII,S$GLB,, | Performed by: INTERNAL MEDICINE

## 2025-04-09 PROCEDURE — 85652 RBC SED RATE AUTOMATED: CPT

## 2025-04-09 PROCEDURE — 4010F ACE/ARB THERAPY RXD/TAKEN: CPT | Mod: CPTII,S$GLB,, | Performed by: INTERNAL MEDICINE

## 2025-04-09 PROCEDURE — 1159F MED LIST DOCD IN RCRD: CPT | Mod: CPTII,S$GLB,, | Performed by: INTERNAL MEDICINE

## 2025-04-09 PROCEDURE — 99214 OFFICE O/P EST MOD 30 MIN: CPT | Mod: S$GLB,,, | Performed by: INTERNAL MEDICINE

## 2025-04-09 PROCEDURE — 1101F PT FALLS ASSESS-DOCD LE1/YR: CPT | Mod: CPTII,S$GLB,, | Performed by: INTERNAL MEDICINE

## 2025-04-09 PROCEDURE — 80053 COMPREHEN METABOLIC PANEL: CPT

## 2025-04-09 PROCEDURE — 1125F AMNT PAIN NOTED PAIN PRSNT: CPT | Mod: CPTII,S$GLB,, | Performed by: INTERNAL MEDICINE

## 2025-04-09 PROCEDURE — 3079F DIAST BP 80-89 MM HG: CPT | Mod: CPTII,S$GLB,, | Performed by: INTERNAL MEDICINE

## 2025-04-09 PROCEDURE — 36415 COLL VENOUS BLD VENIPUNCTURE: CPT

## 2025-04-09 PROCEDURE — 86140 C-REACTIVE PROTEIN: CPT

## 2025-04-09 PROCEDURE — 3288F FALL RISK ASSESSMENT DOCD: CPT | Mod: CPTII,S$GLB,, | Performed by: INTERNAL MEDICINE

## 2025-04-09 PROCEDURE — 84550 ASSAY OF BLOOD/URIC ACID: CPT

## 2025-04-09 PROCEDURE — 99999 PR PBB SHADOW E&M-EST. PATIENT-LVL III: CPT | Mod: PBBFAC,,, | Performed by: INTERNAL MEDICINE

## 2025-04-10 ENCOUNTER — RESULTS FOLLOW-UP (OUTPATIENT)
Dept: RHEUMATOLOGY | Facility: CLINIC | Age: 70
End: 2025-04-10

## 2025-04-10 NOTE — PROGRESS NOTES
History of present illness:  69-year-old gentleman with hypertension, diabetes, and BPH.  I have been following him for over 10 years for seropositive rheumatoid arthritis.  He has no evidence of erosive disease.  He has been on leflunomide since 2014.  His arthritis has been stable since that time.  He has had no recent joint swelling.   He has had no morning stiffness.  He is tolerating the medication.  He has had no fever or infections.  He was last seen 1 year ago.     He developed an episode of podagra.  It occurred after a long car trip.  There was swelling and erythema.  It lasted about 2-3 months.  He still has some residual pain.  He had had no similar episode in the past.  He took naproxen with some relief.  He did not try heat, ice, or topical medications.  He has had no other flare of his arthritis.  He remains on leflunomide.    His prostate cancer remains under observation.  He has had some problems with GERD.  His pacemaker is doing well.      Physical examination:  Musculoskeletal:  Shoulders, elbows, wrists, small joints of the hands are unremarkable.  Hip and knee are unremarkable.  Ankle has full range of motion without pain on range of motion.    He has bilateral bunion deformities, worse on the left than on the right.  He has some tenderness of his left 1st MTP in some pain on range of motion.  There is no erythema or increased warmth.    Assessment:  1.  No evidence of active rheumatoid arthritis   2. I suspect we are just dealing with a painful bunion but I can not rule out that he may be developing gout on top of his rheumatoid arthritis     Plans:  1.  Laboratory studies obtained   2. Continue leflunomide as before   3.  Return in 6 months

## 2025-05-26 ENCOUNTER — LAB VISIT (OUTPATIENT)
Dept: LAB | Facility: HOSPITAL | Age: 70
End: 2025-05-26
Attending: UROLOGY
Payer: MEDICARE

## 2025-05-26 DIAGNOSIS — C61 PROSTATE CANCER: ICD-10-CM

## 2025-05-26 LAB — PSA SERPL-MCNC: 5.13 NG/ML (ref ?–4)

## 2025-05-26 PROCEDURE — 84153 ASSAY OF PSA TOTAL: CPT

## 2025-05-26 PROCEDURE — 36415 COLL VENOUS BLD VENIPUNCTURE: CPT

## 2025-06-04 ENCOUNTER — OFFICE VISIT (OUTPATIENT)
Dept: UROLOGY | Facility: CLINIC | Age: 70
End: 2025-06-04
Payer: MEDICARE

## 2025-06-04 VITALS — RESPIRATION RATE: 16 BRPM | WEIGHT: 230.19 LBS | BODY MASS INDEX: 34.09 KG/M2 | HEIGHT: 69 IN

## 2025-06-04 DIAGNOSIS — C61 PROSTATE CANCER: Primary | ICD-10-CM

## 2025-06-04 DIAGNOSIS — N40.1 BPH WITH OBSTRUCTION/LOWER URINARY TRACT SYMPTOMS: ICD-10-CM

## 2025-06-04 DIAGNOSIS — N13.8 BPH WITH OBSTRUCTION/LOWER URINARY TRACT SYMPTOMS: ICD-10-CM

## 2025-06-04 PROCEDURE — 99215 OFFICE O/P EST HI 40 MIN: CPT | Mod: S$GLB,,, | Performed by: UROLOGY

## 2025-06-04 PROCEDURE — 1101F PT FALLS ASSESS-DOCD LE1/YR: CPT | Mod: CPTII,S$GLB,, | Performed by: UROLOGY

## 2025-06-04 PROCEDURE — 4010F ACE/ARB THERAPY RXD/TAKEN: CPT | Mod: CPTII,S$GLB,, | Performed by: UROLOGY

## 2025-06-04 PROCEDURE — 1160F RVW MEDS BY RX/DR IN RCRD: CPT | Mod: CPTII,S$GLB,, | Performed by: UROLOGY

## 2025-06-04 PROCEDURE — G2211 COMPLEX E/M VISIT ADD ON: HCPCS | Mod: S$GLB,,, | Performed by: UROLOGY

## 2025-06-04 PROCEDURE — 1159F MED LIST DOCD IN RCRD: CPT | Mod: CPTII,S$GLB,, | Performed by: UROLOGY

## 2025-06-04 PROCEDURE — 3008F BODY MASS INDEX DOCD: CPT | Mod: CPTII,S$GLB,, | Performed by: UROLOGY

## 2025-06-04 PROCEDURE — 3288F FALL RISK ASSESSMENT DOCD: CPT | Mod: CPTII,S$GLB,, | Performed by: UROLOGY

## 2025-06-04 PROCEDURE — 1126F AMNT PAIN NOTED NONE PRSNT: CPT | Mod: CPTII,S$GLB,, | Performed by: UROLOGY

## 2025-06-04 PROCEDURE — 99999 PR PBB SHADOW E&M-EST. PATIENT-LVL IV: CPT | Mod: PBBFAC,,, | Performed by: UROLOGY

## 2025-06-05 ENCOUNTER — OFFICE VISIT (OUTPATIENT)
Dept: FAMILY MEDICINE | Facility: CLINIC | Age: 70
End: 2025-06-05
Payer: MEDICARE

## 2025-06-05 ENCOUNTER — LAB VISIT (OUTPATIENT)
Dept: LAB | Facility: HOSPITAL | Age: 70
End: 2025-06-05
Attending: STUDENT IN AN ORGANIZED HEALTH CARE EDUCATION/TRAINING PROGRAM
Payer: MEDICARE

## 2025-06-05 VITALS
HEIGHT: 69 IN | OXYGEN SATURATION: 95 % | HEART RATE: 59 BPM | DIASTOLIC BLOOD PRESSURE: 68 MMHG | BODY MASS INDEX: 34.8 KG/M2 | SYSTOLIC BLOOD PRESSURE: 122 MMHG | WEIGHT: 235 LBS

## 2025-06-05 DIAGNOSIS — C61 PROSTATE CANCER: ICD-10-CM

## 2025-06-05 DIAGNOSIS — I48.0 PAROXYSMAL ATRIAL FIBRILLATION: ICD-10-CM

## 2025-06-05 DIAGNOSIS — R73.03 PRE-DIABETES: ICD-10-CM

## 2025-06-05 DIAGNOSIS — E78.2 MIXED HYPERLIPIDEMIA: ICD-10-CM

## 2025-06-05 DIAGNOSIS — M05.79 SEROPOSITIVE RHEUMATOID ARTHRITIS OF MULTIPLE SITES: ICD-10-CM

## 2025-06-05 DIAGNOSIS — E78.2 MIXED HYPERLIPIDEMIA: Primary | ICD-10-CM

## 2025-06-05 LAB
CHOLEST SERPL-MCNC: 142 MG/DL (ref 120–199)
CHOLEST/HDLC SERPL: 3.9 {RATIO} (ref 2–5)
EAG (OHS): 117 MG/DL (ref 68–131)
HBA1C MFR BLD: 5.7 % (ref 4–5.6)
HDLC SERPL-MCNC: 36 MG/DL (ref 40–75)
HDLC SERPL: 25.4 % (ref 20–50)
LDLC SERPL CALC-MCNC: 91.2 MG/DL (ref 63–159)
NONHDLC SERPL-MCNC: 106 MG/DL
TRIGL SERPL-MCNC: 74 MG/DL (ref 30–150)

## 2025-06-05 PROCEDURE — 1159F MED LIST DOCD IN RCRD: CPT | Mod: CPTII,S$GLB,, | Performed by: STUDENT IN AN ORGANIZED HEALTH CARE EDUCATION/TRAINING PROGRAM

## 2025-06-05 PROCEDURE — 3008F BODY MASS INDEX DOCD: CPT | Mod: CPTII,S$GLB,, | Performed by: STUDENT IN AN ORGANIZED HEALTH CARE EDUCATION/TRAINING PROGRAM

## 2025-06-05 PROCEDURE — 1126F AMNT PAIN NOTED NONE PRSNT: CPT | Mod: CPTII,S$GLB,, | Performed by: STUDENT IN AN ORGANIZED HEALTH CARE EDUCATION/TRAINING PROGRAM

## 2025-06-05 PROCEDURE — 80061 LIPID PANEL: CPT

## 2025-06-05 PROCEDURE — 4010F ACE/ARB THERAPY RXD/TAKEN: CPT | Mod: CPTII,S$GLB,, | Performed by: STUDENT IN AN ORGANIZED HEALTH CARE EDUCATION/TRAINING PROGRAM

## 2025-06-05 PROCEDURE — 3288F FALL RISK ASSESSMENT DOCD: CPT | Mod: CPTII,S$GLB,, | Performed by: STUDENT IN AN ORGANIZED HEALTH CARE EDUCATION/TRAINING PROGRAM

## 2025-06-05 PROCEDURE — 1101F PT FALLS ASSESS-DOCD LE1/YR: CPT | Mod: CPTII,S$GLB,, | Performed by: STUDENT IN AN ORGANIZED HEALTH CARE EDUCATION/TRAINING PROGRAM

## 2025-06-05 PROCEDURE — 83036 HEMOGLOBIN GLYCOSYLATED A1C: CPT

## 2025-06-05 PROCEDURE — 3078F DIAST BP <80 MM HG: CPT | Mod: CPTII,S$GLB,, | Performed by: STUDENT IN AN ORGANIZED HEALTH CARE EDUCATION/TRAINING PROGRAM

## 2025-06-05 PROCEDURE — 1160F RVW MEDS BY RX/DR IN RCRD: CPT | Mod: CPTII,S$GLB,, | Performed by: STUDENT IN AN ORGANIZED HEALTH CARE EDUCATION/TRAINING PROGRAM

## 2025-06-05 PROCEDURE — 99999 PR PBB SHADOW E&M-EST. PATIENT-LVL IV: CPT | Mod: PBBFAC,,, | Performed by: STUDENT IN AN ORGANIZED HEALTH CARE EDUCATION/TRAINING PROGRAM

## 2025-06-05 PROCEDURE — 99213 OFFICE O/P EST LOW 20 MIN: CPT | Mod: S$GLB,,, | Performed by: STUDENT IN AN ORGANIZED HEALTH CARE EDUCATION/TRAINING PROGRAM

## 2025-06-05 PROCEDURE — 36415 COLL VENOUS BLD VENIPUNCTURE: CPT | Mod: PO

## 2025-06-05 PROCEDURE — G2211 COMPLEX E/M VISIT ADD ON: HCPCS | Mod: S$GLB,,, | Performed by: STUDENT IN AN ORGANIZED HEALTH CARE EDUCATION/TRAINING PROGRAM

## 2025-06-05 PROCEDURE — 3074F SYST BP LT 130 MM HG: CPT | Mod: CPTII,S$GLB,, | Performed by: STUDENT IN AN ORGANIZED HEALTH CARE EDUCATION/TRAINING PROGRAM

## 2025-06-06 ENCOUNTER — RESULTS FOLLOW-UP (OUTPATIENT)
Dept: FAMILY MEDICINE | Facility: CLINIC | Age: 70
End: 2025-06-06

## 2025-06-12 ENCOUNTER — HOSPITAL ENCOUNTER (OUTPATIENT)
Dept: RADIOLOGY | Facility: HOSPITAL | Age: 70
Discharge: HOME | End: 2025-06-12
Attending: UROLOGY
Payer: MEDICARE

## 2025-06-12 DIAGNOSIS — C61 PROSTATE CANCER: ICD-10-CM

## 2025-06-26 DIAGNOSIS — E78.2 MIXED HYPERLIPIDEMIA: ICD-10-CM

## 2025-06-26 RX ORDER — ROSUVASTATIN CALCIUM 10 MG/1
10 TABLET, COATED ORAL DAILY
Qty: 90 TABLET | Refills: 3 | Status: SHIPPED | OUTPATIENT
Start: 2025-06-26

## 2025-06-26 NOTE — TELEPHONE ENCOUNTER
Jaswinder Cifuentes  is requesting a refill authorization.  Brief Assessment and Rationale for Refill:  Approve     Medication Therapy Plan:         Comments:     Note composed:9:10 AM 06/26/2025

## 2025-06-26 NOTE — TELEPHONE ENCOUNTER
No care due was identified.  Health [facility] Embedded Care Due Messages. Reference number: [id number].   6/26/2025 8:08:06 AM CDT

## 2025-08-01 ENCOUNTER — TELEPHONE (OUTPATIENT)
Dept: UROLOGY | Facility: CLINIC | Age: 70
End: 2025-08-01
Payer: MEDICARE

## 2025-08-01 DIAGNOSIS — N13.8 BPH WITH OBSTRUCTION/LOWER URINARY TRACT SYMPTOMS: Primary | ICD-10-CM

## 2025-08-01 DIAGNOSIS — C61 PROSTATE CANCER: Primary | ICD-10-CM

## 2025-08-01 DIAGNOSIS — N40.1 BPH WITH OBSTRUCTION/LOWER URINARY TRACT SYMPTOMS: Primary | ICD-10-CM

## 2025-08-01 RX ORDER — CIPROFLOXACIN 500 MG/1
500 TABLET, FILM COATED ORAL 2 TIMES DAILY
Qty: 6 TABLET | Refills: 0 | Status: SHIPPED | OUTPATIENT
Start: 2025-08-01 | End: 2025-08-04

## 2025-08-01 RX ORDER — GENTAMICIN 40 MG/ML
80 INJECTION, SOLUTION INTRAMUSCULAR; INTRAVENOUS ONCE
OUTPATIENT
Start: 2025-08-01 | End: 2025-08-01

## 2025-08-01 NOTE — TELEPHONE ENCOUNTER
Copied from CRM #[id number]. Topic: General Inquiry - Return Call  >> Aug 1, 2025  9:43 AM Susana wrote:  Type:  Patient Returning Call    Who Called:  pt  Who Left Message for Patient:  Bryan  Does the patient know what this is regarding?:  yes  Best Call Back Number:  [phone]    Additional Information:  Please call back to advise, thank you

## 2025-08-01 NOTE — PROGRESS NOTES
Procedure Order to Urology [[id number]]    Electronically signed by: Lena Pelaez Jr., MD on 08/01/25 1007 Status: Active   Ordering user: Lena Pelaez Jr., MD 08/01/25 1007 Authorized by: Lena Pelaez Jr., MD   Ordering mode: Standard   Frequency:  08/01/25 -     Diagnoses  Prostate cancer [C61]   Questionnaire    Question Answer   Procedure Prostate Biopsy   Pre-op Diagnosis Prostate cancer   Facility Name: [place]   Order comments: Uronav prostate biopsy on 8/20/25.   ASC, please order local sedation, UA poct   preop IM Gentamyacin 80mg,160mg if over 300 lbs (no urine Dr pelaez or Tyra)

## 2025-08-11 DIAGNOSIS — Z79.899 LONG-TERM USE OF HIGH-RISK MEDICATION: Primary | ICD-10-CM

## 2025-08-11 DIAGNOSIS — M05.79 SEROPOSITIVE RHEUMATOID ARTHRITIS OF MULTIPLE SITES: ICD-10-CM

## 2025-08-12 ENCOUNTER — PATIENT MESSAGE (OUTPATIENT)
Dept: RHEUMATOLOGY | Facility: CLINIC | Age: 70
End: 2025-08-12
Payer: MEDICARE

## 2025-08-12 RX ORDER — LEFLUNOMIDE 20 MG/1
20 TABLET ORAL DAILY
Qty: 90 TABLET | Refills: 0 | Status: SHIPPED | OUTPATIENT
Start: 2025-08-12

## 2025-08-14 RX ORDER — CIPROFLOXACIN 500 MG/1
500 TABLET, FILM COATED ORAL 2 TIMES DAILY
COMMUNITY

## 2025-08-19 ENCOUNTER — LAB VISIT (OUTPATIENT)
Dept: LAB | Facility: HOSPITAL | Age: 70
End: 2025-08-19
Attending: INTERNAL MEDICINE
Payer: MEDICARE

## 2025-08-19 ENCOUNTER — ANESTHESIA EVENT (OUTPATIENT)
Dept: SURGERY | Facility: HOSPITAL | Age: 70
End: 2025-08-19
Payer: MEDICARE

## 2025-08-19 DIAGNOSIS — Z79.899 LONG-TERM USE OF HIGH-RISK MEDICATION: ICD-10-CM

## 2025-08-19 DIAGNOSIS — M05.79 SEROPOSITIVE RHEUMATOID ARTHRITIS OF MULTIPLE SITES: ICD-10-CM

## 2025-08-19 LAB
ABSOLUTE EOSINOPHIL (OHS): 0.15 K/UL
ABSOLUTE MONOCYTE (OHS): 0.91 K/UL (ref 0.3–1)
ABSOLUTE NEUTROPHIL COUNT (OHS): 3.45 K/UL (ref 1.8–7.7)
ALBUMIN SERPL BCP-MCNC: 3.7 G/DL (ref 3.5–5.2)
ALP SERPL-CCNC: 88 UNIT/L (ref 40–150)
ALT SERPL W/O P-5'-P-CCNC: 29 UNIT/L (ref 0–55)
ANION GAP (OHS): 9 MMOL/L (ref 8–16)
AST SERPL-CCNC: 31 UNIT/L (ref 0–50)
BASOPHILS # BLD AUTO: 0.06 K/UL
BASOPHILS NFR BLD AUTO: 1 %
BILIRUB SERPL-MCNC: 0.4 MG/DL (ref 0.1–1)
BUN SERPL-MCNC: 16 MG/DL (ref 8–23)
CALCIUM SERPL-MCNC: 8.9 MG/DL (ref 8.7–10.5)
CHLORIDE SERPL-SCNC: 104 MMOL/L (ref 95–110)
CO2 SERPL-SCNC: 25 MMOL/L (ref 23–29)
CREAT SERPL-MCNC: 0.9 MG/DL (ref 0.5–1.4)
CRP SERPL-MCNC: 1.9 MG/L
ERYTHROCYTE [DISTWIDTH] IN BLOOD BY AUTOMATED COUNT: 13.5 % (ref 11.5–14.5)
ERYTHROCYTE [SEDIMENTATION RATE] IN BLOOD: 33 MM/HR
GFR SERPLBLD CREATININE-BSD FMLA CKD-EPI: >60 ML/MIN/1.73/M2
GLUCOSE SERPL-MCNC: 106 MG/DL (ref 70–110)
HCT VFR BLD AUTO: 38.9 % (ref 40–54)
HGB BLD-MCNC: 12.8 GM/DL (ref 14–18)
IMM GRANULOCYTES # BLD AUTO: 0.02 K/UL (ref 0–0.04)
IMM GRANULOCYTES NFR BLD AUTO: 0.3 % (ref 0–0.5)
LYMPHOCYTES # BLD AUTO: 1.55 K/UL (ref 1–4.8)
MCH RBC QN AUTO: 29.2 PG (ref 27–31)
MCHC RBC AUTO-ENTMCNC: 32.9 G/DL (ref 32–36)
MCV RBC AUTO: 89 FL (ref 82–98)
NUCLEATED RBC (/100WBC) (OHS): 0 /100 WBC
PLATELET # BLD AUTO: 182 K/UL (ref 150–450)
PMV BLD AUTO: 11.6 FL (ref 9.2–12.9)
POTASSIUM SERPL-SCNC: 4 MMOL/L (ref 3.5–5.1)
PROT SERPL-MCNC: 7 GM/DL (ref 6–8.4)
RBC # BLD AUTO: 4.38 M/UL (ref 4.6–6.2)
RELATIVE EOSINOPHIL (OHS): 2.4 %
RELATIVE LYMPHOCYTE (OHS): 25.2 % (ref 18–48)
RELATIVE MONOCYTE (OHS): 14.8 % (ref 4–15)
RELATIVE NEUTROPHIL (OHS): 56.3 % (ref 38–73)
SODIUM SERPL-SCNC: 138 MMOL/L (ref 136–145)
WBC # BLD AUTO: 6.14 K/UL (ref 3.9–12.7)

## 2025-08-19 PROCEDURE — 86140 C-REACTIVE PROTEIN: CPT

## 2025-08-19 PROCEDURE — 80053 COMPREHEN METABOLIC PANEL: CPT

## 2025-08-19 PROCEDURE — 36415 COLL VENOUS BLD VENIPUNCTURE: CPT | Mod: PO

## 2025-08-19 PROCEDURE — 85651 RBC SED RATE NONAUTOMATED: CPT | Mod: PO

## 2025-08-19 PROCEDURE — 85025 COMPLETE CBC W/AUTO DIFF WBC: CPT

## 2025-08-19 RX ORDER — GLUCAGON 1 MG
1 KIT INJECTION
OUTPATIENT
Start: 2025-08-19

## 2025-08-19 RX ORDER — OXYCODONE HYDROCHLORIDE 5 MG/1
5 TABLET ORAL
Refills: 0 | OUTPATIENT
Start: 2025-08-19

## 2025-08-19 RX ORDER — DIPHENHYDRAMINE HYDROCHLORIDE 50 MG/ML
12.5 INJECTION, SOLUTION INTRAMUSCULAR; INTRAVENOUS EVERY 6 HOURS PRN
OUTPATIENT
Start: 2025-08-19

## 2025-08-19 RX ORDER — FENTANYL CITRATE 50 UG/ML
25 INJECTION, SOLUTION INTRAMUSCULAR; INTRAVENOUS EVERY 5 MIN PRN
Refills: 0 | OUTPATIENT
Start: 2025-08-19

## 2025-08-19 RX ORDER — HYDROMORPHONE HYDROCHLORIDE 2 MG/ML
0.2 INJECTION, SOLUTION INTRAMUSCULAR; INTRAVENOUS; SUBCUTANEOUS EVERY 5 MIN PRN
Refills: 0 | OUTPATIENT
Start: 2025-08-19

## 2025-08-19 RX ORDER — ONDANSETRON HYDROCHLORIDE 2 MG/ML
4 INJECTION, SOLUTION INTRAVENOUS ONCE AS NEEDED
OUTPATIENT
Start: 2025-08-19 | End: 2037-01-15

## 2025-08-20 ENCOUNTER — HOSPITAL ENCOUNTER (OUTPATIENT)
Facility: HOSPITAL | Age: 70
Discharge: HOME | End: 2025-08-20
Attending: UROLOGY | Admitting: UROLOGY
Payer: MEDICARE

## 2025-08-20 ENCOUNTER — ANESTHESIA (OUTPATIENT)
Dept: SURGERY | Facility: HOSPITAL | Age: 70
End: 2025-08-20
Payer: MEDICARE

## 2025-08-20 DIAGNOSIS — N40.0 BENIGN PROSTATIC HYPERPLASIA WITHOUT LOWER URINARY TRACT SYMPTOMS: Primary | ICD-10-CM

## 2025-08-20 DIAGNOSIS — N13.8 BPH WITH OBSTRUCTION/LOWER URINARY TRACT SYMPTOMS: ICD-10-CM

## 2025-08-20 DIAGNOSIS — N42.89 PROSTATE MASS: ICD-10-CM

## 2025-08-20 DIAGNOSIS — N40.1 BPH WITH OBSTRUCTION/LOWER URINARY TRACT SYMPTOMS: ICD-10-CM

## 2025-08-20 LAB — GLUCOSE SERPL-MCNC: 87 MG/DL (ref 70–110)

## 2025-08-20 PROCEDURE — 71000033 HC RECOVERY, INTIAL HOUR: Performed by: UROLOGY

## 2025-08-20 PROCEDURE — 37000008 HC ANESTHESIA 1ST 15 MINUTES: Performed by: UROLOGY

## 2025-08-20 PROCEDURE — 76872 US TRANSRECTAL: CPT | Mod: 26,,, | Performed by: UROLOGY

## 2025-08-20 PROCEDURE — 63600175 PHARM REV CODE 636 W HCPCS: Performed by: NURSE ANESTHETIST, CERTIFIED REGISTERED

## 2025-08-20 PROCEDURE — 27201423 OPTIME MED/SURG SUP & DEVICES STERILE SUPPLY: Performed by: UROLOGY

## 2025-08-20 PROCEDURE — G0416 PROSTATE BIOPSY, ANY MTHD: HCPCS | Mod: TC | Performed by: PATHOLOGY

## 2025-08-20 PROCEDURE — 63600175 PHARM REV CODE 636 W HCPCS: Performed by: UROLOGY

## 2025-08-20 PROCEDURE — 55700 PR BIOPSY OF PROSTATE,NEEDLE/PUNCH: CPT | Mod: ,,, | Performed by: UROLOGY

## 2025-08-20 PROCEDURE — 36000705 HC OR TIME LEV I EA ADD 15 MIN: Performed by: UROLOGY

## 2025-08-20 PROCEDURE — 37000009 HC ANESTHESIA EA ADD 15 MINS: Performed by: UROLOGY

## 2025-08-20 PROCEDURE — 36000704 HC OR TIME LEV I 1ST 15 MIN: Performed by: UROLOGY

## 2025-08-20 RX ORDER — SODIUM CHLORIDE, SODIUM LACTATE, POTASSIUM CHLORIDE, CALCIUM CHLORIDE 600; 310; 30; 20 MG/100ML; MG/100ML; MG/100ML; MG/100ML
INJECTION, SOLUTION INTRAVENOUS CONTINUOUS
Status: DISCONTINUED | OUTPATIENT
Start: 2025-08-20 | End: 2025-08-20 | Stop reason: HOSPADM

## 2025-08-20 RX ORDER — LIDOCAINE HYDROCHLORIDE 10 MG/ML
1 INJECTION, SOLUTION EPIDURAL; INFILTRATION; INTRACAUDAL; PERINEURAL ONCE
Status: DISCONTINUED | OUTPATIENT
Start: 2025-08-20 | End: 2025-08-20 | Stop reason: HOSPADM

## 2025-08-20 RX ORDER — GENTAMICIN 40 MG/ML
80 INJECTION, SOLUTION INTRAMUSCULAR; INTRAVENOUS ONCE
Status: COMPLETED | OUTPATIENT
Start: 2025-08-20 | End: 2025-08-20

## 2025-08-20 RX ORDER — LIDOCAINE HYDROCHLORIDE 20 MG/ML
INJECTION INTRAVENOUS
Status: DISCONTINUED | OUTPATIENT
Start: 2025-08-20 | End: 2025-08-20

## 2025-08-20 RX ORDER — PROPOFOL 10 MG/ML
VIAL (ML) INTRAVENOUS
Status: DISCONTINUED | OUTPATIENT
Start: 2025-08-20 | End: 2025-08-20

## 2025-08-20 RX ADMIN — PROPOFOL 100 MG: 10 INJECTION, EMULSION INTRAVENOUS at 02:08

## 2025-08-20 RX ADMIN — PROPOFOL 50 MG: 10 INJECTION, EMULSION INTRAVENOUS at 02:08

## 2025-08-20 RX ADMIN — GENTAMICIN SULFATE 80 MG: 40 INJECTION, SOLUTION INTRAMUSCULAR; INTRAVENOUS at 01:08

## 2025-08-20 RX ADMIN — LIDOCAINE HYDROCHLORIDE 100 MG: 20 INJECTION, SOLUTION INTRAVENOUS at 02:08

## 2025-08-21 VITALS
DIASTOLIC BLOOD PRESSURE: 55 MMHG | TEMPERATURE: 98 F | OXYGEN SATURATION: 95 % | WEIGHT: 235 LBS | HEART RATE: 58 BPM | SYSTOLIC BLOOD PRESSURE: 110 MMHG | HEIGHT: 69 IN | RESPIRATION RATE: 16 BRPM | BODY MASS INDEX: 34.8 KG/M2

## 2025-08-21 LAB — POCT GLUCOSE: 87 MG/DL (ref 70–110)

## 2025-08-25 ENCOUNTER — TELEPHONE (OUTPATIENT)
Dept: UROLOGY | Facility: CLINIC | Age: 70
End: 2025-08-25
Payer: MEDICARE

## (undated) DEVICE — SCRUB 10% POVIDONE IODINE 4OZ

## (undated) DEVICE — JELLY SURGILUBE LUBE TUBE 2OZ

## (undated) DEVICE — GUIDE BIOPSY BIPLANAR 18G

## (undated) DEVICE — SHEET DRAPE MEDIUM

## (undated) DEVICE — NDL HYPODERMIC BLUNT 18G 1.5IN

## (undated) DEVICE — COVER PROBE 3D/4D

## (undated) DEVICE — GUN BIOPSY 18GA MONOPLY

## (undated) DEVICE — LUBRICANT SURGILUBE 2 OZ

## (undated) DEVICE — SET CYSTO IRRIGATION UNIV SPIK

## (undated) DEVICE — DRAPE MINOR PROCEDURE

## (undated) DEVICE — SPONGE GAUZE 16PLY 4X4

## (undated) DEVICE — COVER TRANSDUCER LATEX N/STERI

## (undated) DEVICE — WATER STERILE INJ 500ML BAG

## (undated) DEVICE — NDL BLUNT W/O FILTER 18GX1.5IN

## (undated) DEVICE — NDL SPINAL 22GX7 SPINOCAN

## (undated) DEVICE — UNDERPAD ULTRASORB 300LB 30X36

## (undated) DEVICE — DRAPE UINDERBUT GRAD PCH

## (undated) DEVICE — SYR LUER LOCK STERILE 10ML

## (undated) DEVICE — SYR 10CC LUER LOCK

## (undated) DEVICE — GLOVE SURG ULTRA TOUCH 7